# Patient Record
Sex: FEMALE | Race: BLACK OR AFRICAN AMERICAN | NOT HISPANIC OR LATINO | Employment: OTHER | URBAN - METROPOLITAN AREA
[De-identification: names, ages, dates, MRNs, and addresses within clinical notes are randomized per-mention and may not be internally consistent; named-entity substitution may affect disease eponyms.]

---

## 2019-07-15 PROBLEM — G89.29 CHRONIC LOW BACK PAIN: Status: ACTIVE | Noted: 2019-07-15

## 2019-07-15 PROBLEM — D50.9 IRON DEFICIENCY ANEMIA: Status: ACTIVE | Noted: 2019-07-15

## 2019-07-15 PROBLEM — E11.9 TYPE 2 DIABETES MELLITUS, WITHOUT LONG-TERM CURRENT USE OF INSULIN: Status: ACTIVE | Noted: 2019-07-15

## 2019-07-15 PROBLEM — M54.50 CHRONIC LOW BACK PAIN: Status: ACTIVE | Noted: 2019-07-15

## 2019-07-15 PROBLEM — E66.01 MORBID OBESITY WITH BODY MASS INDEX (BMI) OF 40.0 TO 49.9: Status: ACTIVE | Noted: 2019-07-15

## 2019-07-15 PROBLEM — N30.00 ACUTE CYSTITIS: Status: ACTIVE | Noted: 2019-07-15

## 2019-07-22 ENCOUNTER — LAB VISIT (OUTPATIENT)
Dept: LAB | Facility: HOSPITAL | Age: 49
End: 2019-07-22
Attending: INTERNAL MEDICINE
Payer: MEDICAID

## 2019-07-22 ENCOUNTER — TELEPHONE (OUTPATIENT)
Dept: ORTHOPEDICS | Facility: CLINIC | Age: 49
End: 2019-07-22

## 2019-07-22 DIAGNOSIS — D50.9 IRON (FE) DEFICIENCY ANEMIA: Primary | ICD-10-CM

## 2019-07-22 LAB
ALBUMIN SERPL BCP-MCNC: 3.7 G/DL (ref 3.5–5.2)
ALP SERPL-CCNC: 70 U/L (ref 55–135)
ALT SERPL W/O P-5'-P-CCNC: 14 U/L (ref 10–44)
ANION GAP SERPL CALC-SCNC: 10 MMOL/L (ref 8–16)
AST SERPL-CCNC: 16 U/L (ref 10–40)
BILIRUB SERPL-MCNC: 0.5 MG/DL (ref 0.1–1)
BUN SERPL-MCNC: 7 MG/DL (ref 6–20)
CALCIUM SERPL-MCNC: 8.9 MG/DL (ref 8.7–10.5)
CHLORIDE SERPL-SCNC: 106 MMOL/L (ref 95–110)
CO2 SERPL-SCNC: 26 MMOL/L (ref 23–29)
CREAT SERPL-MCNC: 0.7 MG/DL (ref 0.5–1.4)
ERYTHROCYTE [DISTWIDTH] IN BLOOD BY AUTOMATED COUNT: 14.7 % (ref 11.5–14.5)
EST. GFR  (AFRICAN AMERICAN): >60 ML/MIN/1.73 M^2
EST. GFR  (NON AFRICAN AMERICAN): >60 ML/MIN/1.73 M^2
ESTIMATED AVG GLUCOSE: 131 MG/DL (ref 68–131)
GLUCOSE SERPL-MCNC: 122 MG/DL (ref 70–110)
HBA1C MFR BLD HPLC: 6.2 % (ref 4.5–6.2)
HCT VFR BLD AUTO: 38.5 % (ref 37–48.5)
HGB BLD-MCNC: 12.2 G/DL (ref 12–16)
IRON SERPL-MCNC: 27 UG/DL (ref 30–160)
MCH RBC QN AUTO: 26.7 PG (ref 27–31)
MCHC RBC AUTO-ENTMCNC: 31.7 G/DL (ref 32–36)
MCV RBC AUTO: 84 FL (ref 82–98)
PLATELET # BLD AUTO: 209 K/UL (ref 150–350)
PMV BLD AUTO: 11.9 FL (ref 9.2–12.9)
POTASSIUM SERPL-SCNC: 3.9 MMOL/L (ref 3.5–5.1)
PROT SERPL-MCNC: 7.8 G/DL (ref 6–8.4)
RBC # BLD AUTO: 4.57 M/UL (ref 4–5.4)
SATURATED IRON: 6 % (ref 20–50)
SODIUM SERPL-SCNC: 142 MMOL/L (ref 136–145)
TOTAL IRON BINDING CAPACITY: 437 UG/DL (ref 250–450)
TRANSFERRIN SERPL-MCNC: 295 MG/DL (ref 200–375)
WBC # BLD AUTO: 7.28 K/UL (ref 3.9–12.7)

## 2019-07-22 PROCEDURE — 83036 HEMOGLOBIN GLYCOSYLATED A1C: CPT

## 2019-07-22 PROCEDURE — 80053 COMPREHEN METABOLIC PANEL: CPT

## 2019-07-22 PROCEDURE — 83540 ASSAY OF IRON: CPT

## 2019-07-22 PROCEDURE — 36415 COLL VENOUS BLD VENIPUNCTURE: CPT

## 2019-07-22 PROCEDURE — 85027 COMPLETE CBC AUTOMATED: CPT

## 2019-07-22 NOTE — TELEPHONE ENCOUNTER
Called patient to schedule referred appointment from Dr. Germán Velasquez with Dr. Katz for treatment of left hip pain. Appointment made and patient voiced understanding of appointment date, time, and location.

## 2019-07-30 DIAGNOSIS — E11.69 TYPE 2 DIABETES MELLITUS WITH OTHER SPECIFIED COMPLICATION, UNSPECIFIED WHETHER LONG TERM INSULIN USE: Primary | ICD-10-CM

## 2019-07-30 RX ORDER — HEPARIN 100 UNIT/ML
500 SYRINGE INTRAVENOUS
Status: CANCELLED | OUTPATIENT
Start: 2019-08-02

## 2019-07-30 RX ORDER — SODIUM CHLORIDE 0.9 % (FLUSH) 0.9 %
10 SYRINGE (ML) INJECTION
Status: CANCELLED | OUTPATIENT
Start: 2019-08-02

## 2019-08-08 ENCOUNTER — INFUSION (OUTPATIENT)
Dept: INFUSION THERAPY | Facility: HOSPITAL | Age: 49
End: 2019-08-08
Payer: MEDICAID

## 2019-08-08 ENCOUNTER — TELEPHONE (OUTPATIENT)
Dept: INFUSION THERAPY | Facility: HOSPITAL | Age: 49
End: 2019-08-08

## 2019-08-08 VITALS
HEART RATE: 72 BPM | SYSTOLIC BLOOD PRESSURE: 171 MMHG | RESPIRATION RATE: 18 BRPM | OXYGEN SATURATION: 97 % | DIASTOLIC BLOOD PRESSURE: 86 MMHG | TEMPERATURE: 98 F

## 2019-08-08 DIAGNOSIS — D50.0 IRON DEFICIENCY ANEMIA DUE TO CHRONIC BLOOD LOSS: Primary | ICD-10-CM

## 2019-08-08 RX ORDER — SODIUM CHLORIDE 0.9 % (FLUSH) 0.9 %
10 SYRINGE (ML) INJECTION
Status: DISCONTINUED | OUTPATIENT
Start: 2019-08-08 | End: 2019-08-08 | Stop reason: HOSPADM

## 2019-08-08 RX ORDER — SODIUM CHLORIDE 0.9 % (FLUSH) 0.9 %
10 SYRINGE (ML) INJECTION
Status: CANCELLED | OUTPATIENT
Start: 2019-08-15

## 2019-08-08 RX ORDER — SODIUM CHLORIDE 0.9 % (FLUSH) 0.9 %
10 SYRINGE (ML) INJECTION
Status: CANCELLED | OUTPATIENT
Start: 2019-08-08

## 2019-08-08 RX ORDER — SODIUM CHLORIDE 0.9 % (FLUSH) 0.9 %
10 SYRINGE (ML) INJECTION
Status: CANCELLED | OUTPATIENT
Start: 2019-09-02

## 2019-08-08 RX ORDER — SODIUM CHLORIDE 9 MG/ML
INJECTION, SOLUTION INTRAVENOUS CONTINUOUS
Status: CANCELLED | OUTPATIENT
Start: 2019-08-15

## 2019-08-08 RX ORDER — HEPARIN 100 UNIT/ML
500 SYRINGE INTRAVENOUS
Status: CANCELLED | OUTPATIENT
Start: 2019-09-02

## 2019-08-08 RX ORDER — SODIUM CHLORIDE 9 MG/ML
INJECTION, SOLUTION INTRAVENOUS CONTINUOUS
Status: CANCELLED | OUTPATIENT
Start: 2019-08-08

## 2019-08-08 NOTE — NURSING
1015 patient called Dr Moreno office about order as I was calling the office. She stated that the office would be calling here. I hung up phone to await for call. RB RB  1050 I called Dr Moreno office and they faxed over the correct order for injectafer. Awaiting approval RB RN  1243 patient stated that she had to be home to get her child. States to call and reschedule her appointment. D/pricilla iv heplock catheter intact and dressing applied to site. DAWNA RN

## 2019-08-08 NOTE — NURSING
0901 patient ambulated to opt. Vital signs taken. RB RN  0920 iv stated to right ac 20 gauge jelco times 2 attempts. Patient tolerated well. RB RN  0959 pharmacy called Janice stated the 750mg dose was for injectofer not venofer could I call MD and find out what medication they wanted. DAWNA RN  0902 called Dr Moreno office Eloise in office stated that Dr Velasquez had just went in room and would call this nurse back. Awaiting call back explained this to patient. DAWNA DONAHUE

## 2019-08-09 ENCOUNTER — TELEPHONE (OUTPATIENT)
Dept: PHARMACY | Facility: CLINIC | Age: 49
End: 2019-08-09

## 2019-08-09 NOTE — TELEPHONE ENCOUNTER
Informed Patient  that [facility] received prescription for Injectafer and benefits investigation is required.  [facility] will be back in touch once insurance determination is received.

## 2019-08-16 ENCOUNTER — INFUSION (OUTPATIENT)
Dept: INFUSION THERAPY | Facility: HOSPITAL | Age: 49
End: 2019-08-16
Payer: MEDICAID

## 2019-08-16 VITALS
HEART RATE: 65 BPM | DIASTOLIC BLOOD PRESSURE: 84 MMHG | SYSTOLIC BLOOD PRESSURE: 177 MMHG | OXYGEN SATURATION: 100 % | TEMPERATURE: 98 F | RESPIRATION RATE: 18 BRPM

## 2019-08-16 DIAGNOSIS — D50.0 IRON DEFICIENCY ANEMIA DUE TO CHRONIC BLOOD LOSS: Primary | ICD-10-CM

## 2019-08-16 PROCEDURE — 96365 THER/PROPH/DIAG IV INF INIT: CPT

## 2019-08-16 PROCEDURE — 63600175 PHARM REV CODE 636 W HCPCS: Mod: JG | Performed by: INTERNAL MEDICINE

## 2019-08-16 RX ORDER — SODIUM CHLORIDE 0.9 % (FLUSH) 0.9 %
10 SYRINGE (ML) INJECTION
Status: DISCONTINUED | OUTPATIENT
Start: 2019-08-16 | End: 2019-08-16 | Stop reason: HOSPADM

## 2019-08-16 RX ORDER — HEPARIN 100 UNIT/ML
500 SYRINGE INTRAVENOUS
Status: DISCONTINUED | OUTPATIENT
Start: 2019-08-16 | End: 2019-08-16 | Stop reason: HOSPADM

## 2019-08-16 RX ORDER — SODIUM CHLORIDE 9 MG/ML
INJECTION, SOLUTION INTRAVENOUS CONTINUOUS
Status: CANCELLED | OUTPATIENT
Start: 2019-08-23

## 2019-08-16 RX ORDER — SODIUM CHLORIDE 0.9 % (FLUSH) 0.9 %
10 SYRINGE (ML) INJECTION
Status: CANCELLED | OUTPATIENT
Start: 2019-08-23

## 2019-08-16 RX ORDER — HEPARIN 100 UNIT/ML
500 SYRINGE INTRAVENOUS
Status: CANCELLED | OUTPATIENT
Start: 2019-08-23

## 2019-08-16 RX ADMIN — FERRIC CARBOXYMALTOSE INJECTION 750 MG: 50 INJECTION, SOLUTION INTRAVENOUS at 08:08

## 2019-08-16 NOTE — NURSING
0902 PT TOLERATED INFUSION WELL.  IVH DC'D/ PRESSURE AND DRESSING APPLIED/ NO BLEEDING NOTED.  PTS BP ELEVATED PRE AND POST INFUSION.  ADVISED TO CONSULT PCP DR. CUNHA FOR PT EVALUATION.  PT STATED SHE HAS A HX OF FIBROMYALGIA AND SHE IS HURTING 8/10 ALL OVER BODY.  PT VERBALIZED UNDERSTANDING.  PT LEFT OPT AMBULATORY WITH SPOUSE TO POV / HOME.  LHD, RN.

## 2019-08-16 NOTE — NURSING
0828 PT PRESENTED TO THE OPT DEPT VIA AMB FOR INJECTAFER INFUSION/PT IS AAOx3/COOPERATIVE AND PLEASANT WITH STAFF. POC DISCUSSED WITH PT/2 DRUG ALLERGIES NOT LISTED RECEIVED FROM PT TODAY AND ADDED TO CHART. CALL LIGHT WITHIN REACH/SPOUSE WITH PT TODAY. MEDICATIONS RELEASED/PHARMACY CALLED/ORDER RECEIVED ON THEIR END. DBRN.

## 2019-09-10 DIAGNOSIS — M25.551 BILATERAL HIP PAIN: Primary | ICD-10-CM

## 2019-09-10 DIAGNOSIS — M25.552 BILATERAL HIP PAIN: Primary | ICD-10-CM

## 2019-09-11 ENCOUNTER — HOSPITAL ENCOUNTER (OUTPATIENT)
Dept: RADIOLOGY | Facility: HOSPITAL | Age: 49
Discharge: HOME | End: 2019-09-11
Attending: ORTHOPAEDIC SURGERY
Payer: MEDICAID

## 2019-09-11 ENCOUNTER — OFFICE VISIT (OUTPATIENT)
Dept: ORTHOPEDICS | Facility: CLINIC | Age: 49
End: 2019-09-11
Payer: MEDICAID

## 2019-09-11 VITALS
HEIGHT: 65 IN | SYSTOLIC BLOOD PRESSURE: 175 MMHG | WEIGHT: 293 LBS | HEART RATE: 61 BPM | RESPIRATION RATE: 18 BRPM | DIASTOLIC BLOOD PRESSURE: 88 MMHG | BODY MASS INDEX: 48.82 KG/M2

## 2019-09-11 DIAGNOSIS — M70.62 GREATER TROCHANTERIC BURSITIS, LEFT: Primary | ICD-10-CM

## 2019-09-11 DIAGNOSIS — M25.552 BILATERAL HIP PAIN: ICD-10-CM

## 2019-09-11 DIAGNOSIS — M25.551 BILATERAL HIP PAIN: ICD-10-CM

## 2019-09-11 DIAGNOSIS — M54.10 RADICULAR PAIN OF LEFT LOWER EXTREMITY: ICD-10-CM

## 2019-09-11 DIAGNOSIS — M16.0 PRIMARY OSTEOARTHRITIS OF BOTH HIPS: ICD-10-CM

## 2019-09-11 PROCEDURE — 20610 LARGE JOINT ASPIRATION/INJECTION: L GREATER TROCHANTERIC BURSA: ICD-10-PCS | Mod: S$PBB,LT,, | Performed by: ORTHOPAEDIC SURGERY

## 2019-09-11 PROCEDURE — 73521 XR HIPS BILATERAL 2 VIEW INCL AP PELVIS: ICD-10-PCS | Mod: 26,,, | Performed by: RADIOLOGY

## 2019-09-11 PROCEDURE — 73521 X-RAY EXAM HIPS BI 2 VIEWS: CPT | Mod: 26,,, | Performed by: RADIOLOGY

## 2019-09-11 PROCEDURE — 73521 X-RAY EXAM HIPS BI 2 VIEWS: CPT | Mod: TC,PN

## 2019-09-11 PROCEDURE — 99213 OFFICE O/P EST LOW 20 MIN: CPT | Mod: PBBFAC,25,PN | Performed by: ORTHOPAEDIC SURGERY

## 2019-09-11 PROCEDURE — 99204 PR OFFICE/OUTPT VISIT, NEW, LEVL IV, 45-59 MIN: ICD-10-PCS | Mod: 25,S$PBB,, | Performed by: ORTHOPAEDIC SURGERY

## 2019-09-11 PROCEDURE — 99204 OFFICE O/P NEW MOD 45 MIN: CPT | Mod: 25,S$PBB,, | Performed by: ORTHOPAEDIC SURGERY

## 2019-09-11 PROCEDURE — 20610 DRAIN/INJ JOINT/BURSA W/O US: CPT | Mod: PBBFAC,PN | Performed by: ORTHOPAEDIC SURGERY

## 2019-09-11 PROCEDURE — 99999 PR PBB SHADOW E&M-EST. PATIENT-LVL III: ICD-10-PCS | Mod: PBBFAC,,, | Performed by: ORTHOPAEDIC SURGERY

## 2019-09-11 PROCEDURE — 99999 PR PBB SHADOW E&M-EST. PATIENT-LVL III: CPT | Mod: PBBFAC,,, | Performed by: ORTHOPAEDIC SURGERY

## 2019-09-11 RX ORDER — NEOMYCIN SULFATE, POLYMYXIN B SULFATE AND DEXAMETHASONE 3.5; 10000; 1 MG/ML; [USP'U]/ML; MG/ML
SUSPENSION/ DROPS OPHTHALMIC
Refills: 0 | COMMUNITY
Start: 2019-07-30 | End: 2019-10-09

## 2019-09-11 RX ORDER — CIPROFLOXACIN HYDROCHLORIDE 3 MG/ML
SOLUTION/ DROPS OPHTHALMIC
Refills: 1 | COMMUNITY
Start: 2019-09-06 | End: 2019-10-09

## 2019-09-11 RX ORDER — ERYTHROMYCIN 5 MG/G
OINTMENT OPHTHALMIC
Refills: 0 | COMMUNITY
Start: 2019-09-06 | End: 2019-10-09

## 2019-09-11 RX ORDER — OXYMORPHONE HYDROCHLORIDE 20 MG/1
TABLET, FILM COATED, EXTENDED RELEASE ORAL
Refills: 0 | COMMUNITY
Start: 2019-09-06 | End: 2021-03-01

## 2019-09-11 RX ORDER — OXYCODONE HYDROCHLORIDE 15 MG/1
TABLET ORAL
Refills: 0 | COMMUNITY
Start: 2019-09-04 | End: 2021-08-04

## 2019-09-11 RX ADMIN — TRIAMCINOLONE ACETONIDE 40 MG: 40 INJECTION, SUSPENSION INTRA-ARTICULAR; INTRAMUSCULAR at 03:09

## 2019-09-11 NOTE — LETTER
September 12, 2019      Germán Velasquez MD  [address] [place] [address] [place] [address]           [facility]  [address] [place] [address] A  [place] [address]  Phone: [phone]  Fax: [phone]          Patient: Nely Salas   MR Number: [id number]   YOB: 1970   Date of Visit: 9/11/2019       Dear Dr. Germán Velasquez:    Thank you for referring Nely Salas to me for evaluation. Attached you will find relevant portions of my assessment and plan of care.    If you have questions, please do not hesitate to call me. I look forward to following Nely Salas along with you.    Sincerely,    Wiliam Katz, DO    Enclosure  CC:  No Recipients    If you would like to receive this communication electronically, please contact [email or web address] or [phone] to request more information on [employer] Link access.    For providers and/or their staff who would like to refer a patient to [facility], please contact us through our one-stop-shop provider referral line, [facility], at [phone].    If you feel you have received this communication in error or would no longer like to receive these types of communications, please e-mail [email or web address]

## 2019-09-12 PROBLEM — M54.10 RADICULAR PAIN OF LEFT LOWER EXTREMITY: Status: ACTIVE | Noted: 2019-09-12

## 2019-09-12 PROBLEM — M70.62 GREATER TROCHANTERIC BURSITIS, LEFT: Status: ACTIVE | Noted: 2019-09-12

## 2019-09-12 PROBLEM — M16.0 PRIMARY OSTEOARTHRITIS OF BOTH HIPS: Status: ACTIVE | Noted: 2019-09-12

## 2019-09-12 RX ORDER — TRIAMCINOLONE ACETONIDE 40 MG/ML
40 INJECTION, SUSPENSION INTRA-ARTICULAR; INTRAMUSCULAR
Status: DISCONTINUED | OUTPATIENT
Start: 2019-09-11 | End: 2019-09-12 | Stop reason: HOSPADM

## 2019-09-12 NOTE — PROGRESS NOTES
Subjective:      Patient ID: Nely Salas is a 49 y.o. female.    Chief Complaint: Pain of the Right Hip and Pain of the Left Hip    Referring Provider: Germán Velasquez Md  [address] [place] [address], [place] [address]    HPI:  Ms. Salas is a 49-year-old lady who presented today with complaints of bilateral hip pain, left greater than right, of an 8 year duration of insidious onset.  Her symptoms have increased over the last year.  Laying down increases her pain she stated that the pain also radiates down her left leg to her knee and ankle. She has taken NSAIDs without help.  She has not done physical therapy nor had injections.    Past Medical History:   Diagnosis Date     *  *  *  *  *  *  * Hypertension  Seasonal allergies  Anxiety  GERD  Headaches  Sickle cell trait  Fibromyalgia  Chronic pain management      Past Surgical History:   Procedure Laterality Date    CHOLECYSTECTOMY       *  * FALLOPIAN TUBOPLASTY    EGD         Review of patient's allergies indicates:   Allergen Reactions    Bactrim [sulfamethoxazole-trimethoprim] Nausea And Vomiting    Zithromax [azithromycin] Nausea And Vomiting       Social History     Occupational History    UNEMPLOYED   Tobacco Use    Smoking status: Never Smoker    Smokeless tobacco: Never Used   Substance and Sexual Activity    Alcohol use: Yes     Frequency: Monthly or less    Drug use: Never    Sexual activity: Yes     Partners: Male      Family history:  Father:  Alive, stroke.  Mother:  Alive, hypothyroidism/hypertension.  Brother:  6, alive, stroke/diabetes.  Sister:  6, 1 , murder.  Son:  3, alive, denied medical problems.  Daughter:  2, alive, denied medical problems.    Previous Hospitalizations:  Childbirth.    ROS:   Review of Systems   Constitution: Negative for chills and fever.   HENT: Negative for congestion.    Eyes: Negative for blurred vision.   Cardiovascular: Negative for chest pain.   Respiratory: Negative for cough.     Endocrine: Negative for polydipsia.   Hematologic/Lymphatic: Negative for adenopathy.   Skin: Negative for flushing and itching.   Musculoskeletal: Positive for back pain and joint pain. Negative for gout.   Gastrointestinal: Positive for heartburn. Negative for constipation and diarrhea.   Genitourinary: Negative for nocturia.   Neurological: Positive for headaches. Negative for seizures.   Psychiatric/Behavioral: Negative for depression and substance abuse. The patient is nervous/anxious.    Allergic/Immunologic: Positive for environmental allergies.           Objective:      Physical Exam:   General: AAOx3.  No acute distress  HEENT: Normocephalic, PEARLA EOMI, edentulous  Neck: Supple, No JVD  Chest: Symetric, equal excursion on inspiration  Abdomen: Soft NTND  Vascular:  Pulses intact and equal bilaterally.  Capillary refill less than 3 seconds and equal bilaterally  Neurologic:  Pinprick and soft touch intact and equal bilaterally.  Positive straight leg raising left lower extremity.  Integment:  No ecchymosis, no errythema  Extremity:  Hip:  Flexion/extension equal bilaterally greater than 95°/15°.  Internal/external rotation equal bilaterally. Relatively nontender with hip motion. Tender with palpation left greater trochanter.  José's positive left hip.                      Lumbosacral spine:  Forward flexion/backward flexion 45/10 degrees, increased symptoms with backward flexion. Right/left rotation 40/40 degrees, increased symptoms with left rotation. Right/left side bending 25/25 degrees, increased symptoms with left side bending.  Able to heel and toe walk. Tender with palpation lumbosacral spine.  Radiography:  Personally reviewed x-rays of both hips completed on 09/11/2019 showed arthritic changes of both hips with right greater than left and lumbosacral arthritic changes.      Assessment:       Impression:      1. Greater trochanteric bursitis, left    2. Primary osteoarthritis of both hips    3.  Radicular pain of left lower extremity          Plan:       1.  Discussed physical examination and radiographic findings with the patient. Nely understands that she has greater trochanteric bursitis of her left hip as well as a radicular lower extremity problem.  She can trial conservative management or consider surgical intervention.  She stated she would prefer to trial conservative management.  2.  Offered a steroid injection to the greater trochanteric bursa of the left hip, she elected proceed.  3.  Recommend the patient consult with her spine surgeon about the radicular left lower extremity pain. She stated she would call her previous spine surgeon.  4.  Take NSAIDs as tolerated allowed by PCM.  5.  Home exercises to include hip stretching exercises were shown discussed.  6.  Discussed possible referral to physical therapy she declined for now.  7.  [facility] portal was discussed with the patient and information was given.  The patient was encouraged to use the portal for future encounters.  8.  Follow up p.r.n..

## 2019-09-12 NOTE — PROCEDURES
Large Joint Aspiration/Injection: L greater trochanteric bursa  Date/Time: 9/11/2019 3:52 PM  Performed by: Wiliam Katz DO  Authorized by: Wiliam Katz, DO     Consent Done?:  Yes (Verbal)  Indications:  Pain and diagnostic evaluation  Procedure site marked: Yes    Timeout: Prior to procedure the correct patient, procedure, and site was verified      Location:  Hip  Site:  L greater trochanteric bursa  Prep: Patient was prepped and draped in usual sterile fashion    Needle size:  22 G  Medications:  40 mg triamcinolone acetonide 40 mg/mL  Patient tolerance:  Patient tolerated the procedure well with no immediate complications

## 2019-10-03 ENCOUNTER — TELEPHONE (OUTPATIENT)
Dept: INFUSION THERAPY | Facility: HOSPITAL | Age: 49
End: 2019-10-03

## 2019-10-09 ENCOUNTER — OFFICE VISIT (OUTPATIENT)
Dept: PODIATRY | Facility: CLINIC | Age: 49
End: 2019-10-09
Payer: MEDICAID

## 2019-10-09 VITALS
DIASTOLIC BLOOD PRESSURE: 86 MMHG | BODY MASS INDEX: 48.82 KG/M2 | TEMPERATURE: 98 F | HEART RATE: 85 BPM | HEIGHT: 65 IN | SYSTOLIC BLOOD PRESSURE: 145 MMHG | WEIGHT: 293 LBS

## 2019-10-09 DIAGNOSIS — R23.4 FISSURE IN SKIN: ICD-10-CM

## 2019-10-09 DIAGNOSIS — M79.7 FIBROMYALGIA: ICD-10-CM

## 2019-10-09 DIAGNOSIS — M19.079 OSTEOARTHRITIS OF ANKLE AND FOOT, UNSPECIFIED LATERALITY: Primary | ICD-10-CM

## 2019-10-09 DIAGNOSIS — M79.2 NEURITIS: ICD-10-CM

## 2019-10-09 PROCEDURE — 99999 PR PBB SHADOW E&M-EST. PATIENT-LVL III: ICD-10-PCS | Mod: PBBFAC,,, | Performed by: PODIATRIST

## 2019-10-09 PROCEDURE — 99203 PR OFFICE/OUTPT VISIT, NEW, LEVL III, 30-44 MIN: ICD-10-PCS | Mod: S$PBB,,, | Performed by: PODIATRIST

## 2019-10-09 PROCEDURE — 99999 PR PBB SHADOW E&M-EST. PATIENT-LVL III: CPT | Mod: PBBFAC,,, | Performed by: PODIATRIST

## 2019-10-09 PROCEDURE — 99203 OFFICE O/P NEW LOW 30 MIN: CPT | Mod: S$PBB,,, | Performed by: PODIATRIST

## 2019-10-09 PROCEDURE — 99213 OFFICE O/P EST LOW 20 MIN: CPT | Mod: PBBFAC | Performed by: PODIATRIST

## 2019-10-09 RX ORDER — DICLOFENAC SODIUM 10 MG/G
2 GEL TOPICAL 4 TIMES DAILY
Qty: 3 TUBE | Refills: 3 | Status: SHIPPED | OUTPATIENT
Start: 2019-10-09 | End: 2021-08-04

## 2019-10-09 RX ORDER — SPIRONOLACTONE 100 MG/1
100 TABLET, FILM COATED ORAL DAILY
Refills: 1 | COMMUNITY
Start: 2019-08-16 | End: 2021-03-01

## 2019-10-12 PROBLEM — E11.9 TYPE 2 DIABETES MELLITUS, WITHOUT LONG-TERM CURRENT USE OF INSULIN: Status: RESOLVED | Noted: 2019-07-15 | Resolved: 2019-10-12

## 2019-10-12 PROBLEM — M79.7 FIBROMYALGIA: Status: ACTIVE | Noted: 2019-10-12

## 2019-10-13 NOTE — PROGRESS NOTES
Subjective:       Patient ID: Nely Salas is a 49 y.o. female.    Chief Complaint: Foot Pain; Foot Problem; and Nail Problem   Patient presents today for new patient evaluation she has burning itching skin with discomfort all over both feet.  Patient relates her feet have been burning and uncomfortable for the past 2 months patient has no history of diabetes.    Past Medical History:   Diagnosis Date    Hypertension      Past Surgical History:   Procedure Laterality Date    CHOLECYSTECTOMY      FALLOPIAN TUBOPLASTY       History reviewed. No pertinent family history.  Social History     Socioeconomic History    Marital status:      Spouse name: Not on file    Number of children: Not on file    Years of education: Not on file    Highest education level: Not on file   Occupational History    Not on file   Social Needs    Financial resource strain: Not on file    Food insecurity:     Worry: Not on file     Inability: Not on file    Transportation needs:     Medical: Not on file     Non-medical: Not on file   Tobacco Use    Smoking status: Never Smoker    Smokeless tobacco: Never Used   Substance and Sexual Activity    Alcohol use: Yes     Frequency: Monthly or less    Drug use: Never    Sexual activity: Yes     Partners: Male   Lifestyle    Physical activity:     Days per week: Not on file     Minutes per session: Not on file    Stress: Not on file   Relationships    Social connections:     Talks on phone: Not on file     Gets together: Not on file     Attends Confucianism service: Not on file     Active member of club or organization: Not on file     Attends meetings of clubs or organizations: Not on file     Relationship status: Not on file   Other Topics Concern    Not on file   Social History Narrative    Not on file       Current Outpatient Medications   Medication Sig Dispense Refill    oxyCODONE (ROXICODONE) 15 MG Tab TAKE 1 TABLET BY MOUTH THREE TIMES DAILY AS NEEDED FOR PAIN MAY  "CAUSE DROWSINESS  0    oxyMORphone (OPANA ER) 20 MG 12 hr tablet TAKE 1 TABLET BY MOUTH EVERY TWELVE HOURS AS NEEDED FOR PAIN MAY CAUSE DROWSINESS  0    pregabalin (LYRICA) 150 MG capsule Take 150 mg by mouth 2 (two) times daily.      diclofenac sodium (VOLTAREN) 1 % Gel Apply 2 g topically 4 (four) times daily. 3 Tube 3    phentermine 37.5 MG capsule Take 37.5 mg by mouth every morning.      ranitidine (ZANTAC) 150 MG tablet Take 1 tablet (150 mg total) by mouth 2 (two) times daily. (Patient not taking: Reported on 10/9/2019) 60 tablet 11    semaglutide (OZEMPIC) 1 mg/dose (2 mg/1.5 mL) PnIj Inject 1 mg into the skin every 7 days.      sodium chloride 0.9% SolP 250 mL with iron dextran 50 mg/mL Soln Inject 750 mg into the vein once a week.      spironolactone (ALDACTONE) 100 MG tablet Take 100 mg by mouth once daily.  1     No current facility-administered medications for this visit.      Review of patient's allergies indicates:   Allergen Reactions    Bactrim [sulfamethoxazole-trimethoprim] Nausea And Vomiting    Zithromax [azithromycin] Nausea And Vomiting    Amoxicillin trihydrate Other (See Comments)    Sulfamethoxazole Other (See Comments)    Trimethoprim Other (See Comments)       Review of Systems   Musculoskeletal: Positive for arthralgias and joint swelling.   Neurological: Positive for numbness.   All other systems reviewed and are negative.      Objective:      Vitals:    10/09/19 1341   BP: (!) 145/86   Pulse: 85   Temp: 98.4 °F (36.9 °C)   Weight: (!) 144.2 kg (318 lb)   Height: 5' 5" (1.651 m)     Physical Exam   Constitutional: She appears well-developed and well-nourished.   Cardiovascular:   Pulses:       Dorsalis pedis pulses are 1+ on the right side, and 1+ on the left side.        Posterior tibial pulses are 1+ on the right side, and 1+ on the left side.   Pulmonary/Chest: Effort normal.   Musculoskeletal: Normal range of motion. She exhibits tenderness and deformity.   Feet: "   Right Foot:   Protective Sensation: 4 sites tested. 3 sites sensed.   Skin Integrity: Positive for warmth and dry skin.   Left Foot:   Protective Sensation: 4 sites tested. 3 sites sensed.   Skin Integrity: Positive for warmth and dry skin.   Neurological: She is alert.   Skin: Skin is warm. Capillary refill takes 2 to 3 seconds.   Psychiatric: She has a normal mood and affect. Her behavior is normal. Judgment and thought content normal.   Nursing note and vitals reviewed.                           Assessment:       1. Osteoarthritis of ankle and foot, unspecified laterality    2. Fissure in skin    3. Neuritis    4. Fibromyalgia        Plan:       Patient presents today for new patient evaluation she does not have a history of diabetes and states that she has been experiencing itching burning throbbing pain in both of her feet for the past 2 months this is especially bad at night.  Patient is currently under the care of pain management she takes Lyrica 150 mg twice a day she does have a history of fibromyalgia.  Patient's most recent A1c done in July was 6.2 she is not currently being treated for diabetes.  Patient indicates her left foot is much worse than her right she states the worst thing that she wears on her feet are sandals she does get some slight relief when she wears closed in tennis shoes.  Patient has excessively dry cracking flaking skin on both feet she also has thick fungally infected toenails no current signs of bacterial infection noted. Following a lengthy evaluation and discussion with the patient I have advised her obviously the tennis shoes feel better because they have better support the patient needs appropriate support I dispensed the patient some blue arch pads to apply to her sandals and her tennis shoes she is to wear these at all times of weight-bearing I have also recommended Voltaren gel to help with the patient's inflammation and pain on her feet I will consider possibly a diabetic  insole for the patient with additional arch support depending upon how she does with the blue arch pads dispensed today.  Patient will start applying Vicks vapor rub to her nails twice a day every day to control the fungal infection in the nails making them easier to trim and to help prevent ingrowing nails.  Plan follow-up will be 3 weeks patient advised to contact us if she has any problems questions or concerns prior to scheduled follow-up.  Patient was advised the burning pain that she is having at night is likely related to nerve discomfort she is currently under the care of pain management I did not want to adjust her Lyrica because that is being taking care of by pain management.  Total face-to-face time including discussion evaluation and treatment equaled 30 min.This note was created using [employer] voice recognition software that occasionally misinterpreted phrases or words.

## 2019-11-05 DIAGNOSIS — M89.8X5 PAIN OF LEFT FEMUR: Primary | ICD-10-CM

## 2019-11-08 ENCOUNTER — HOSPITAL ENCOUNTER (OUTPATIENT)
Dept: RADIOLOGY | Facility: HOSPITAL | Age: 49
Discharge: HOME | End: 2019-11-08
Attending: ORTHOPAEDIC SURGERY
Payer: MEDICAID

## 2019-11-08 ENCOUNTER — OFFICE VISIT (OUTPATIENT)
Dept: ORTHOPEDICS | Facility: CLINIC | Age: 49
End: 2019-11-08
Payer: MEDICAID

## 2019-11-08 VITALS
SYSTOLIC BLOOD PRESSURE: 177 MMHG | DIASTOLIC BLOOD PRESSURE: 93 MMHG | BODY MASS INDEX: 48.82 KG/M2 | WEIGHT: 293 LBS | HEART RATE: 96 BPM | HEIGHT: 65 IN

## 2019-11-08 DIAGNOSIS — M70.62 GREATER TROCHANTERIC BURSITIS, LEFT: ICD-10-CM

## 2019-11-08 DIAGNOSIS — M54.10 RADICULAR PAIN OF LEFT LOWER EXTREMITY: Primary | ICD-10-CM

## 2019-11-08 DIAGNOSIS — M89.8X5 PAIN OF LEFT FEMUR: ICD-10-CM

## 2019-11-08 PROCEDURE — 99213 OFFICE O/P EST LOW 20 MIN: CPT | Mod: S$PBB,,, | Performed by: ORTHOPAEDIC SURGERY

## 2019-11-08 PROCEDURE — 99999 PR PBB SHADOW E&M-EST. PATIENT-LVL III: ICD-10-PCS | Mod: PBBFAC,,, | Performed by: ORTHOPAEDIC SURGERY

## 2019-11-08 PROCEDURE — 73552 X-RAY EXAM OF FEMUR 2/>: CPT | Mod: 26,LT,, | Performed by: RADIOLOGY

## 2019-11-08 PROCEDURE — 73552 XR FEMUR 2 VIEW LEFT: ICD-10-PCS | Mod: 26,LT,, | Performed by: RADIOLOGY

## 2019-11-08 PROCEDURE — 99999 PR PBB SHADOW E&M-EST. PATIENT-LVL III: CPT | Mod: PBBFAC,,, | Performed by: ORTHOPAEDIC SURGERY

## 2019-11-08 PROCEDURE — 73552 X-RAY EXAM OF FEMUR 2/>: CPT | Mod: TC,PN,LT

## 2019-11-08 PROCEDURE — 99213 OFFICE O/P EST LOW 20 MIN: CPT | Mod: PBBFAC,25,PN | Performed by: ORTHOPAEDIC SURGERY

## 2019-11-08 PROCEDURE — 99213 PR OFFICE/OUTPT VISIT, EST, LEVL III, 20-29 MIN: ICD-10-PCS | Mod: S$PBB,,, | Performed by: ORTHOPAEDIC SURGERY

## 2019-11-13 ENCOUNTER — OFFICE VISIT (OUTPATIENT)
Dept: PODIATRY | Facility: CLINIC | Age: 49
End: 2019-11-13
Payer: MEDICAID

## 2019-11-13 VITALS
SYSTOLIC BLOOD PRESSURE: 184 MMHG | HEART RATE: 71 BPM | BODY MASS INDEX: 65.91 KG/M2 | DIASTOLIC BLOOD PRESSURE: 100 MMHG | TEMPERATURE: 98 F | HEIGHT: 56 IN | WEIGHT: 293 LBS

## 2019-11-13 DIAGNOSIS — R23.4 FISSURE IN SKIN OF FOOT: ICD-10-CM

## 2019-11-13 DIAGNOSIS — M79.2 NEURITIS: Primary | ICD-10-CM

## 2019-11-13 DIAGNOSIS — M19.079 OSTEOARTHRITIS OF ANKLE AND FOOT, UNSPECIFIED LATERALITY: ICD-10-CM

## 2019-11-13 DIAGNOSIS — M79.7 FIBROMYALGIA: ICD-10-CM

## 2019-11-13 PROCEDURE — 99999 PR PBB SHADOW E&M-EST. PATIENT-LVL III: CPT | Mod: PBBFAC,,, | Performed by: PODIATRIST

## 2019-11-13 PROCEDURE — 99213 OFFICE O/P EST LOW 20 MIN: CPT | Mod: PBBFAC | Performed by: PODIATRIST

## 2019-11-13 PROCEDURE — 99213 PR OFFICE/OUTPT VISIT, EST, LEVL III, 20-29 MIN: ICD-10-PCS | Mod: S$PBB,,, | Performed by: PODIATRIST

## 2019-11-13 PROCEDURE — 99213 OFFICE O/P EST LOW 20 MIN: CPT | Mod: S$PBB,,, | Performed by: PODIATRIST

## 2019-11-13 PROCEDURE — 99999 PR PBB SHADOW E&M-EST. PATIENT-LVL III: ICD-10-PCS | Mod: PBBFAC,,, | Performed by: PODIATRIST

## 2019-11-13 RX ORDER — UREA 40 %
CREAM (GRAM) TOPICAL 2 TIMES DAILY
Qty: 85 G | Refills: 6 | Status: SHIPPED | OUTPATIENT
Start: 2019-11-13 | End: 2019-12-13

## 2019-11-13 RX ORDER — VENLAFAXINE HYDROCHLORIDE 75 MG/1
CAPSULE, EXTENDED RELEASE ORAL
Refills: 0 | COMMUNITY
Start: 2019-11-01 | End: 2021-03-01

## 2019-11-13 NOTE — LETTER
November 17, 2019      Germán Velasquez MD  [address] [place] [address]           [facility]  [address] [facility] [place] [address]  Phone: [phone]  Fax: [phone]          Patient: Nely Salas   MR Number: [id number]   YOB: 1970   Date of Visit: 11/13/2019       Dear Dr. Germán Velasquez:    Thank you for referring Nely Salas to me for evaluation. Attached you will find relevant portions of my assessment and plan of care.    If you have questions, please do not hesitate to call me. I look forward to following Nely Salas along with you.    Sincerely,    Panda Guerrero, DAYRON    Enclosure  CC:  No Recipients    If you would like to receive this communication electronically, please contact [email or web address] or [phone] to request more information on [employer] [facility] Link access.    For providers and/or their staff who would like to refer a patient to [facility], please contact us through our one-stop-shop provider referral line, [facility] , at [phone].    If you feel you have received this communication in error or would no longer like to receive these types of communications, please e-mail [email or web address]

## 2019-11-13 NOTE — PROGRESS NOTES
Subjective:      Patient ID: Nely Salas is a 49 y.o. female.    Chief Complaint: Leg Pain (femur and hip)      HPI: Ms. Salas returns today for re-evaluation of her left hip. She stated that the pain radiates from her left buttocks to her left ankle. At her last visit on 09/11/2012 she was referred to a spine surgeon.  She stated she has not seen a spine surgeon yet.  She had an exacerbation of her pain last night and went to the emergency room where she was given a shot.  At her last visit she was given a steroid injection which gave her some relief over hip but it did not resolve the pain that radiated from her buttocks to her ankle.    ROS:  No new diagnosis/surgery/prescriptions since last office visit on 09/11/2012.  Constitution: Negative for chills and fever.   HENT: Negative for congestion.    Eyes: Negative for blurred vision.   Cardiovascular: Negative for chest pain.   Respiratory: Negative for cough.    Endocrine: Negative for polydipsia.   Hematologic/Lymphatic: Negative for adenopathy.   Skin: Negative for flushing and itching.   Musculoskeletal: Positive for back pain and joint pain. Negative for gout.   Gastrointestinal: Positive for heartburn. Negative for constipation and diarrhea.   Genitourinary: Negative for nocturia.   Neurological: Positive for headaches. Negative for seizures.   Psychiatric/Behavioral: Negative for depression and substance abuse. The patient is nervous/anxious.    Allergic/Immunologic: Positive for environmental allergies.       Objective:      Physical Exam:   General: AAOx3.  No acute distress  Vascular:  Pulses intact and equal bilaterally.  Capillary refill less than 3 seconds and equal bilaterally  Neurologic:  Pinprick and soft touch intact and equal bilaterally.  Positive straight leg raising left lower extremity  Integment:  No ecchymosis, no errythema  Extremity: Hip:  Flexion/extension equal bilaterally greater than 95°/15°.  Internal/external rotation equal  bilaterally. Relatively nontender with hip motion. Minimal tenderness with palpation left greater trochanter.  José's  relatively negative both hips.  Nontender with groin palpation bilaterally.                      Lumbosacral spine:  Forward flexion/backward flexion 45/10 degrees, increased symptoms with backward flexion. Right/left rotation 40/40 degrees, increased symptoms with left rotation. Right/left side bending 25/25 degrees, increased symptoms with left side bending.  Able to heel and toe walk. Tender with palpation lumbosacral spine.  Radiography:  X-rays of the left femur completed on 11/08/2019 showed a ruffled border of the greater trochanter consistent with greater trochanteric bursitis, otherwise no fracture dislocation.      Assessment:       Impression:   1.  Radicular left lower extremity pain.  2.  Mild greater trochanteric bursitis left hip.      Plan:       1.  Discussed physical examination with the patient. Nely understands that she has radicular left lower extremity pain.  She understands that this office does not treat back issues and she was referred to a spine surgeon.  She needs to follow-up with a spine surgeon.  She also understands she was recently given a steroid injection to the greater trochanter and since it was so recent another when cannot be given for at least another month.  2.  The patient was referred to Spine surgery at last visit she understands she should see the spine surgeon.  3.  Take NSAIDs as tolerated allowed by PCM.  4.  Do home exercises as previously discussed such as hip stretching exercises.  5.  Offered to refer to physical therapy she declined for now.  6.  [facility] portal was discussed with the patient and information was given.  The patient was encouraged to use the portal for future encounters.  7.  Follow up p.r.n..

## 2019-11-17 NOTE — PROGRESS NOTES
Subjective:       Patient ID: Nely Salas is a 49 y.o. female.    Chief Complaint: Follow-up; Foot Pain; Foot Problem; and Nail Problem   Patient presents today for new patient evaluation she has burning itching skin with discomfort all over both feet.  Patient relates her feet have been burning and uncomfortable for the past 2 months patient has no history of diabetes.    Past Medical History:   Diagnosis Date    Hypertension      Past Surgical History:   Procedure Laterality Date    CHOLECYSTECTOMY      FALLOPIAN TUBOPLASTY       History reviewed. No pertinent family history.  Social History     Socioeconomic History    Marital status:      Spouse name: Not on file    Number of children: Not on file    Years of education: Not on file    Highest education level: Not on file   Occupational History    Not on file   Social Needs    Financial resource strain: Not on file    Food insecurity:     Worry: Not on file     Inability: Not on file    Transportation needs:     Medical: Not on file     Non-medical: Not on file   Tobacco Use    Smoking status: Never Smoker    Smokeless tobacco: Never Used   Substance and Sexual Activity    Alcohol use: Yes     Frequency: Monthly or less    Drug use: Never    Sexual activity: Yes     Partners: Male   Lifestyle    Physical activity:     Days per week: Not on file     Minutes per session: Not on file    Stress: Not on file   Relationships    Social connections:     Talks on phone: Not on file     Gets together: Not on file     Attends Voodoo service: Not on file     Active member of club or organization: Not on file     Attends meetings of clubs or organizations: Not on file     Relationship status: Not on file   Other Topics Concern    Not on file   Social History Narrative    Not on file       Current Outpatient Medications   Medication Sig Dispense Refill    oxyCODONE (ROXICODONE) 15 MG Tab TAKE 1 TABLET BY MOUTH THREE TIMES DAILY AS NEEDED FOR  "PAIN MAY CAUSE DROWSINESS  0    oxyMORphone (OPANA ER) 20 MG 12 hr tablet TAKE 1 TABLET BY MOUTH EVERY TWELVE HOURS AS NEEDED FOR PAIN MAY CAUSE DROWSINESS  0    phentermine 37.5 MG capsule Take 37.5 mg by mouth every morning.      pregabalin (LYRICA) 150 MG capsule Take 150 mg by mouth 2 (two) times daily.      ranitidine (ZANTAC) 150 MG tablet Take 1 tablet (150 mg total) by mouth 2 (two) times daily. 60 tablet 11    semaglutide (OZEMPIC) 1 mg/dose (2 mg/1.5 mL) PnIj Inject 1 mg into the skin every 7 days.      sodium chloride 0.9% SolP 250 mL with iron dextran 50 mg/mL Soln Inject 750 mg into the vein once a week.      spironolactone (ALDACTONE) 100 MG tablet Take 100 mg by mouth once daily.  1    venlafaxine (EFFEXOR-XR) 75 MG 24 hr capsule TAKE 1 CAPSULE BY MOUTH EVERY MORNING AS DIRECTED FOR MOOD  0    diclofenac sodium (VOLTAREN) 1 % Gel Apply 2 g topically 4 (four) times daily. 3 Tube 3    urea (CARMOL) 40 % Crea Apply topically 2 (two) times daily. 85 g 6     No current facility-administered medications for this visit.      Review of patient's allergies indicates:   Allergen Reactions    Bactrim [sulfamethoxazole-trimethoprim] Nausea And Vomiting    Zithromax [azithromycin] Nausea And Vomiting    Amoxicillin trihydrate Other (See Comments)    Sulfamethoxazole Other (See Comments)    Trimethoprim Other (See Comments)       Review of Systems   Musculoskeletal: Positive for arthralgias and joint swelling.   Neurological: Positive for numbness.   All other systems reviewed and are negative.      Objective:      Vitals:    11/13/19 1453   BP: (!) 184/100   Pulse: 71   Temp: 98 °F (36.7 °C)   Weight: (!) 146.1 kg (322 lb)   Height: 4' 8" (1.422 m)     Physical Exam   Constitutional: She appears well-developed and well-nourished.   Cardiovascular:   Pulses:       Dorsalis pedis pulses are 1+ on the right side, and 1+ on the left side.        Posterior tibial pulses are 1+ on the right side, and 1+ " on the left side.   Pulmonary/Chest: Effort normal.   Musculoskeletal: Normal range of motion. She exhibits tenderness and deformity.   Feet:   Right Foot:   Protective Sensation: 4 sites tested. 3 sites sensed.   Skin Integrity: Positive for warmth and dry skin.   Left Foot:   Protective Sensation: 4 sites tested. 3 sites sensed.   Skin Integrity: Positive for warmth and dry skin.   Neurological: She is alert.   Skin: Skin is warm. Capillary refill takes 2 to 3 seconds.   Psychiatric: She has a normal mood and affect. Her behavior is normal. Judgment and thought content normal.   Nursing note and vitals reviewed.                           Assessment:       1. Neuritis    2. Fibromyalgia    3. Fissure in skin of foot    4. Osteoarthritis of ankle and foot, unspecified laterality        Plan:       Patient presents today for new patient evaluation she does not have a history of diabetes and states that she has been experiencing itching burning throbbing pain in both of her feet for the past 2 months this is especially bad at night.  Patient is currently under the care of pain management she takes Lyrica 150 mg twice a day she does have a history of fibromyalgia.  Patient's most recent A1c done in July was 6.2 she is not currently being treated for diabetes.  Patient indicates her left foot is much worse than her right she states the worst thing that she wears on her feet are sandals she does get some slight relief when she wears closed in tennis shoes.  Patient has excessively dry cracking flaking skin on both feet she also has thick fungally infected toenails no current signs of bacterial infection noted. Patient indicated she has been using the Voltaren gel it has definitely helped to relieve some of her symptoms.  Patient indicates the arch pads in the form of diabetic insoles definitely helped her although she is not wearing them today I did give the patient a prescription for 40% urea cream to apply twice a day  every day for the excessively dry cracking fissuring skin on the plantar surface of both feet.  Patient advised keeping her feet well moist rated will likely help the burning that she is experiencing on the bottom of her feet.  Patient will start applying Vicks vapor rub to her nails twice a day every day to control the fungal infection in the nails making them easier to trim and to help prevent ingrowing nails.  Patient was advised the burning pain that she is having at night is likely related to nerve discomfort she is currently under the care of pain management I did not want to adjust her Lyrica because that is being taking care of by pain management.  Patient has not started applying Vicks vapor rub on a regular basis yet I have recommended she do this to make it easier to trim her nails which will help to prevent the chronically ingrown toenails on both big toes.  Plan follow-up will be as needed prescription dispensed for 40% urea.  Total face-to-face time including discussion evaluation and treatment equaled 20 min.This note was created using [employer] voice recognition software that occasionally misinterpreted phrases or words.

## 2020-01-27 ENCOUNTER — LAB VISIT (OUTPATIENT)
Dept: LAB | Facility: HOSPITAL | Age: 50
End: 2020-01-27
Attending: INTERNAL MEDICINE
Payer: MEDICAID

## 2020-01-27 DIAGNOSIS — E61.1 IRON DEFICIENCY: Primary | ICD-10-CM

## 2020-01-27 DIAGNOSIS — D64.9 ANEMIA: ICD-10-CM

## 2020-01-27 LAB
ALBUMIN SERPL BCP-MCNC: 3.8 G/DL (ref 3.5–5.2)
ALP SERPL-CCNC: 61 U/L (ref 55–135)
ALT SERPL W/O P-5'-P-CCNC: 14 U/L (ref 10–44)
ANION GAP SERPL CALC-SCNC: 8 MMOL/L (ref 8–16)
AST SERPL-CCNC: 16 U/L (ref 10–40)
BILIRUB SERPL-MCNC: 0.6 MG/DL (ref 0.1–1)
BUN SERPL-MCNC: 10 MG/DL (ref 6–20)
CALCIUM SERPL-MCNC: 8.4 MG/DL (ref 8.7–10.5)
CHLORIDE SERPL-SCNC: 102 MMOL/L (ref 95–110)
CO2 SERPL-SCNC: 24 MMOL/L (ref 23–29)
CREAT SERPL-MCNC: 0.6 MG/DL (ref 0.5–1.4)
ERYTHROCYTE [DISTWIDTH] IN BLOOD BY AUTOMATED COUNT: 12.8 % (ref 11.5–14.5)
EST. GFR  (AFRICAN AMERICAN): >60 ML/MIN/1.73 M^2
EST. GFR  (NON AFRICAN AMERICAN): >60 ML/MIN/1.73 M^2
ESTIMATED AVG GLUCOSE: 134 MG/DL (ref 68–131)
GLUCOSE SERPL-MCNC: 143 MG/DL (ref 70–110)
HBA1C MFR BLD HPLC: 6.3 % (ref 4.5–6.2)
HCT VFR BLD AUTO: 40.1 % (ref 37–48.5)
HGB BLD-MCNC: 12.8 G/DL (ref 12–16)
IRON SERPL-MCNC: 55 UG/DL (ref 30–160)
MCH RBC QN AUTO: 26.9 PG (ref 27–31)
MCHC RBC AUTO-ENTMCNC: 31.9 G/DL (ref 32–36)
MCV RBC AUTO: 84 FL (ref 82–98)
PLATELET # BLD AUTO: 208 K/UL (ref 150–350)
PMV BLD AUTO: 12.1 FL (ref 9.2–12.9)
POTASSIUM SERPL-SCNC: 3.3 MMOL/L (ref 3.5–5.1)
PROT SERPL-MCNC: 7.6 G/DL (ref 6–8.4)
RBC # BLD AUTO: 4.75 M/UL (ref 4–5.4)
SATURATED IRON: 14 % (ref 20–50)
SODIUM SERPL-SCNC: 134 MMOL/L (ref 136–145)
TOTAL IRON BINDING CAPACITY: 406 UG/DL (ref 250–450)
TRANSFERRIN SERPL-MCNC: 274 MG/DL (ref 200–375)
WBC # BLD AUTO: 7.87 K/UL (ref 3.9–12.7)

## 2020-01-27 PROCEDURE — 36415 COLL VENOUS BLD VENIPUNCTURE: CPT

## 2020-01-27 PROCEDURE — 85027 COMPLETE CBC AUTOMATED: CPT

## 2020-01-27 PROCEDURE — 83540 ASSAY OF IRON: CPT

## 2020-01-27 PROCEDURE — 83036 HEMOGLOBIN GLYCOSYLATED A1C: CPT

## 2020-01-27 PROCEDURE — 80053 COMPREHEN METABOLIC PANEL: CPT

## 2020-02-03 ENCOUNTER — TELEPHONE (OUTPATIENT)
Dept: INFUSION THERAPY | Facility: HOSPITAL | Age: 50
End: 2020-02-03

## 2020-02-03 NOTE — TELEPHONE ENCOUNTER
Faxed provider to inform staff of diagnosis code and authorization number requirement. Awaiting information to schedule patient.

## 2020-09-18 ENCOUNTER — LAB VISIT (OUTPATIENT)
Dept: LAB | Facility: HOSPITAL | Age: 50
End: 2020-09-18
Attending: INTERNAL MEDICINE
Payer: MEDICAID

## 2020-09-18 DIAGNOSIS — E11.9 TYPE 2 DIABETES MELLITUS: Primary | ICD-10-CM

## 2020-09-18 LAB
ERYTHROCYTE [DISTWIDTH] IN BLOOD BY AUTOMATED COUNT: 13.4 % (ref 11.5–14.5)
ESTIMATED AVG GLUCOSE: 209 MG/DL (ref 68–131)
HBA1C MFR BLD HPLC: 8.9 % (ref 4.5–6.2)
HCT VFR BLD AUTO: 36.5 % (ref 37–48.5)
HGB BLD-MCNC: 11.6 G/DL (ref 12–16)
IRON SERPL-MCNC: 35 UG/DL (ref 30–160)
MCH RBC QN AUTO: 26.4 PG (ref 27–31)
MCHC RBC AUTO-ENTMCNC: 31.8 G/DL (ref 32–36)
MCV RBC AUTO: 83 FL (ref 82–98)
PLATELET # BLD AUTO: 261 K/UL (ref 150–350)
PMV BLD AUTO: 12.5 FL (ref 9.2–12.9)
RBC # BLD AUTO: 4.4 M/UL (ref 4–5.4)
SATURATED IRON: 9 % (ref 20–50)
T4 SERPL-MCNC: 7.4 UG/DL (ref 4.5–11.5)
TOTAL IRON BINDING CAPACITY: 374 UG/DL (ref 250–450)
TRANSFERRIN SERPL-MCNC: 253 MG/DL (ref 200–375)
TSH SERPL DL<=0.005 MIU/L-ACNC: 1.28 UIU/ML (ref 0.34–5.6)
WBC # BLD AUTO: 7.39 K/UL (ref 3.9–12.7)

## 2020-09-18 PROCEDURE — 85027 COMPLETE CBC AUTOMATED: CPT

## 2020-09-18 PROCEDURE — 84443 ASSAY THYROID STIM HORMONE: CPT

## 2020-09-18 PROCEDURE — 84436 ASSAY OF TOTAL THYROXINE: CPT

## 2020-09-18 PROCEDURE — 36415 COLL VENOUS BLD VENIPUNCTURE: CPT

## 2020-09-18 PROCEDURE — 83036 HEMOGLOBIN GLYCOSYLATED A1C: CPT

## 2020-09-18 PROCEDURE — 83540 ASSAY OF IRON: CPT

## 2020-09-29 PROBLEM — E11.9 TYPE 2 DIABETES MELLITUS WITHOUT COMPLICATION, WITHOUT LONG-TERM CURRENT USE OF INSULIN: Status: ACTIVE | Noted: 2020-09-29

## 2020-12-11 PROBLEM — M10.9: Status: ACTIVE | Noted: 2020-12-11

## 2021-03-01 ENCOUNTER — OFFICE VISIT (OUTPATIENT)
Dept: PODIATRY | Facility: CLINIC | Age: 51
End: 2021-03-01
Payer: MEDICAID

## 2021-03-01 VITALS
SYSTOLIC BLOOD PRESSURE: 142 MMHG | BODY MASS INDEX: 48.82 KG/M2 | TEMPERATURE: 98 F | HEART RATE: 69 BPM | HEIGHT: 65 IN | WEIGHT: 293 LBS | DIASTOLIC BLOOD PRESSURE: 80 MMHG

## 2021-03-01 DIAGNOSIS — E11.9 TYPE 2 DIABETES MELLITUS WITHOUT COMPLICATION, WITHOUT LONG-TERM CURRENT USE OF INSULIN: Primary | ICD-10-CM

## 2021-03-01 DIAGNOSIS — E11.49 TYPE II DIABETES MELLITUS WITH NEUROLOGICAL MANIFESTATIONS: ICD-10-CM

## 2021-03-01 DIAGNOSIS — M79.2 NEURITIS: ICD-10-CM

## 2021-03-01 DIAGNOSIS — M79.7 FIBROMYALGIA: ICD-10-CM

## 2021-03-01 PROCEDURE — 99213 OFFICE O/P EST LOW 20 MIN: CPT | Mod: PBBFAC | Performed by: PODIATRIST

## 2021-03-01 PROCEDURE — 99214 OFFICE O/P EST MOD 30 MIN: CPT | Mod: S$PBB,,, | Performed by: PODIATRIST

## 2021-03-01 PROCEDURE — 99214 PR OFFICE/OUTPT VISIT, EST, LEVL IV, 30-39 MIN: ICD-10-PCS | Mod: S$PBB,,, | Performed by: PODIATRIST

## 2021-03-01 PROCEDURE — 99999 PR PBB SHADOW E&M-EST. PATIENT-LVL III: ICD-10-PCS | Mod: PBBFAC,,, | Performed by: PODIATRIST

## 2021-03-01 PROCEDURE — 99999 PR PBB SHADOW E&M-EST. PATIENT-LVL III: CPT | Mod: PBBFAC,,, | Performed by: PODIATRIST

## 2021-03-01 RX ORDER — MELOXICAM 15 MG/1
15 TABLET ORAL DAILY
Qty: 30 TABLET | Refills: 1 | Status: SHIPPED | OUTPATIENT
Start: 2021-03-01 | End: 2021-05-13

## 2021-03-01 RX ORDER — AMLODIPINE BESYLATE 10 MG/1
10 TABLET ORAL DAILY
COMMUNITY
Start: 2020-11-24 | End: 2021-09-26

## 2021-05-03 ENCOUNTER — TELEPHONE (OUTPATIENT)
Dept: PODIATRY | Facility: CLINIC | Age: 51
End: 2021-05-03

## 2021-06-24 ENCOUNTER — TELEPHONE (OUTPATIENT)
Dept: ORTHOPEDICS | Facility: CLINIC | Age: 51
End: 2021-06-24

## 2021-07-07 DIAGNOSIS — M25.512 ACUTE PAIN OF LEFT SHOULDER: Primary | ICD-10-CM

## 2021-07-09 ENCOUNTER — HOSPITAL ENCOUNTER (OUTPATIENT)
Dept: RADIOLOGY | Facility: HOSPITAL | Age: 51
Discharge: HOME | End: 2021-07-09
Attending: ORTHOPAEDIC SURGERY
Payer: MEDICAID

## 2021-07-09 ENCOUNTER — OFFICE VISIT (OUTPATIENT)
Dept: ORTHOPEDICS | Facility: CLINIC | Age: 51
End: 2021-07-09
Payer: MEDICAID

## 2021-07-09 VITALS
WEIGHT: 293 LBS | HEART RATE: 69 BPM | OXYGEN SATURATION: 97 % | RESPIRATION RATE: 18 BRPM | BODY MASS INDEX: 48.82 KG/M2 | HEIGHT: 65 IN

## 2021-07-09 DIAGNOSIS — M25.512 ACUTE PAIN OF LEFT SHOULDER: ICD-10-CM

## 2021-07-09 DIAGNOSIS — M19.012 GLENOHUMERAL ARTHRITIS, LEFT: ICD-10-CM

## 2021-07-09 DIAGNOSIS — M75.82 ROTATOR CUFF TENDINITIS, LEFT: Primary | ICD-10-CM

## 2021-07-09 DIAGNOSIS — M75.22 BICEPS TENDONITIS, LEFT: ICD-10-CM

## 2021-07-09 DIAGNOSIS — M79.2 RADICULAR PAIN OF LEFT UPPER EXTREMITY: ICD-10-CM

## 2021-07-09 DIAGNOSIS — M19.012 ARTHRITIS OF LEFT ACROMIOCLAVICULAR JOINT: ICD-10-CM

## 2021-07-09 PROCEDURE — 99214 PR OFFICE/OUTPT VISIT, EST, LEVL IV, 30-39 MIN: ICD-10-PCS | Mod: 25,S$PBB,, | Performed by: ORTHOPAEDIC SURGERY

## 2021-07-09 PROCEDURE — 99999 PR PBB SHADOW E&M-EST. PATIENT-LVL III: CPT | Mod: PBBFAC,,, | Performed by: ORTHOPAEDIC SURGERY

## 2021-07-09 PROCEDURE — 73030 XR SHOULDER TRAUMA 3 VIEW LEFT: ICD-10-PCS | Mod: 26,LT,, | Performed by: RADIOLOGY

## 2021-07-09 PROCEDURE — 20605 DRAIN/INJ JOINT/BURSA W/O US: CPT | Mod: S$PBB,51,LT, | Performed by: ORTHOPAEDIC SURGERY

## 2021-07-09 PROCEDURE — 99214 OFFICE O/P EST MOD 30 MIN: CPT | Mod: 25,S$PBB,, | Performed by: ORTHOPAEDIC SURGERY

## 2021-07-09 PROCEDURE — 99213 OFFICE O/P EST LOW 20 MIN: CPT | Mod: PBBFAC | Performed by: ORTHOPAEDIC SURGERY

## 2021-07-09 PROCEDURE — 20605 PR DRAIN/INJECT INTERMEDIATE JOINT/BURSA: ICD-10-PCS | Mod: S$PBB,51,LT, | Performed by: ORTHOPAEDIC SURGERY

## 2021-07-09 PROCEDURE — 20610 DRAIN/INJ JOINT/BURSA W/O US: CPT | Mod: PBBFAC | Performed by: ORTHOPAEDIC SURGERY

## 2021-07-09 PROCEDURE — 99999 PR PBB SHADOW E&M-EST. PATIENT-LVL III: ICD-10-PCS | Mod: PBBFAC,,, | Performed by: ORTHOPAEDIC SURGERY

## 2021-07-09 PROCEDURE — 73030 X-RAY EXAM OF SHOULDER: CPT | Mod: 26,LT,, | Performed by: RADIOLOGY

## 2021-07-09 PROCEDURE — 20610 LARGE JOINT ASPIRATION/INJECTION: L GLENOHUMERAL: ICD-10-PCS | Mod: S$PBB,LT,, | Performed by: ORTHOPAEDIC SURGERY

## 2021-07-09 PROCEDURE — 73030 X-RAY EXAM OF SHOULDER: CPT | Mod: TC,FY,LT

## 2021-07-09 RX ORDER — TRIAMCINOLONE ACETONIDE 40 MG/ML
40 INJECTION, SUSPENSION INTRA-ARTICULAR; INTRAMUSCULAR
Status: DISCONTINUED | OUTPATIENT
Start: 2021-07-09 | End: 2021-07-09 | Stop reason: HOSPADM

## 2021-07-09 RX ADMIN — TRIAMCINOLONE ACETONIDE 40 MG: 40 INJECTION, SUSPENSION INTRA-ARTICULAR; INTRAMUSCULAR at 09:07

## 2021-07-09 RX ADMIN — TRIAMCINOLONE ACETONIDE 10 MG: 10 INJECTION, SUSPENSION INTRA-ARTICULAR; INTRALESIONAL at 09:07

## 2021-07-16 ENCOUNTER — TELEPHONE (OUTPATIENT)
Dept: OBSTETRICS AND GYNECOLOGY | Facility: CLINIC | Age: 51
End: 2021-07-16

## 2021-07-30 ENCOUNTER — TELEPHONE (OUTPATIENT)
Dept: PODIATRY | Facility: CLINIC | Age: 51
End: 2021-07-30

## 2021-08-04 ENCOUNTER — OFFICE VISIT (OUTPATIENT)
Dept: PODIATRY | Facility: CLINIC | Age: 51
End: 2021-08-04
Payer: MEDICAID

## 2021-08-04 VITALS
TEMPERATURE: 98 F | SYSTOLIC BLOOD PRESSURE: 143 MMHG | WEIGHT: 293 LBS | HEART RATE: 78 BPM | HEIGHT: 65 IN | DIASTOLIC BLOOD PRESSURE: 79 MMHG | BODY MASS INDEX: 48.82 KG/M2

## 2021-08-04 DIAGNOSIS — M79.2 NEURITIS: ICD-10-CM

## 2021-08-04 DIAGNOSIS — E11.9 COMPREHENSIVE DIABETIC FOOT EXAMINATION, TYPE 2 DM, ENCOUNTER FOR: ICD-10-CM

## 2021-08-04 DIAGNOSIS — M19.079 OSTEOARTHRITIS OF ANKLE AND FOOT, UNSPECIFIED LATERALITY: ICD-10-CM

## 2021-08-04 DIAGNOSIS — E11.49 TYPE II DIABETES MELLITUS WITH NEUROLOGICAL MANIFESTATIONS: Primary | ICD-10-CM

## 2021-08-04 PROCEDURE — 99999 PR PBB SHADOW E&M-EST. PATIENT-LVL IV: ICD-10-PCS | Mod: PBBFAC,,, | Performed by: PODIATRIST

## 2021-08-04 PROCEDURE — 99999 PR PBB SHADOW E&M-EST. PATIENT-LVL IV: CPT | Mod: PBBFAC,,, | Performed by: PODIATRIST

## 2021-08-04 PROCEDURE — 99214 OFFICE O/P EST MOD 30 MIN: CPT | Mod: S$PBB,,, | Performed by: PODIATRIST

## 2021-08-04 PROCEDURE — 99214 OFFICE O/P EST MOD 30 MIN: CPT | Mod: PBBFAC | Performed by: PODIATRIST

## 2021-08-04 PROCEDURE — 99214 PR OFFICE/OUTPT VISIT, EST, LEVL IV, 30-39 MIN: ICD-10-PCS | Mod: S$PBB,,, | Performed by: PODIATRIST

## 2021-08-04 RX ORDER — SPIRONOLACTONE AND HYDROCHLOROTHIAZIDE 25; 25 MG/1; MG/1
2 TABLET ORAL DAILY
COMMUNITY
Start: 2021-05-28 | End: 2021-09-26

## 2021-08-04 RX ORDER — OXYCODONE AND ACETAMINOPHEN 10; 325 MG/1; MG/1
1 TABLET ORAL EVERY 8 HOURS PRN
COMMUNITY
Start: 2021-07-25 | End: 2021-09-26

## 2021-08-04 RX ORDER — AMITRIPTYLINE HYDROCHLORIDE 50 MG/1
50 TABLET, FILM COATED ORAL NIGHTLY
Qty: 30 TABLET | Refills: 3 | Status: SHIPPED | OUTPATIENT
Start: 2021-08-04 | End: 2021-09-26

## 2021-08-04 RX ORDER — VALSARTAN AND HYDROCHLOROTHIAZIDE 80; 12.5 MG/1; MG/1
TABLET, FILM COATED ORAL
COMMUNITY
Start: 2021-04-15 | End: 2021-09-26

## 2021-08-04 RX ORDER — ERGOCALCIFEROL 1.25 MG/1
50000 CAPSULE ORAL
COMMUNITY
Start: 2021-07-14 | End: 2021-09-26

## 2021-08-04 RX ORDER — OXYCODONE HYDROCHLORIDE 15 MG/1
15 TABLET ORAL EVERY 4 HOURS PRN
COMMUNITY

## 2021-08-04 RX ORDER — SOD SULF/POT CHLORIDE/MAG SULF 1.479 G
TABLET ORAL
COMMUNITY
Start: 2021-04-15 | End: 2021-09-26

## 2021-08-04 RX ORDER — LANCETS 30 GAUGE
EACH MISCELLANEOUS
COMMUNITY
Start: 2021-05-11 | End: 2021-09-26

## 2021-08-04 RX ORDER — LANCETS 30 GAUGE
EACH MISCELLANEOUS
COMMUNITY
Start: 2021-05-11 | End: 2024-04-01

## 2021-08-04 RX ORDER — CIPROFLOXACIN 500 MG/1
500 TABLET ORAL
COMMUNITY
Start: 2021-07-25 | End: 2021-09-26

## 2021-08-07 PROBLEM — E11.9 COMPREHENSIVE DIABETIC FOOT EXAMINATION, TYPE 2 DM, ENCOUNTER FOR: Status: ACTIVE | Noted: 2021-08-07

## 2021-09-23 ENCOUNTER — OFFICE VISIT (OUTPATIENT)
Dept: PODIATRY | Facility: CLINIC | Age: 51
End: 2021-09-23
Payer: MEDICAID

## 2021-09-23 VITALS
WEIGHT: 293 LBS | RESPIRATION RATE: 18 BRPM | SYSTOLIC BLOOD PRESSURE: 129 MMHG | HEIGHT: 65 IN | BODY MASS INDEX: 48.82 KG/M2 | HEART RATE: 82 BPM | DIASTOLIC BLOOD PRESSURE: 75 MMHG

## 2021-09-23 DIAGNOSIS — B35.3 TINEA PEDIS OF BOTH FEET: ICD-10-CM

## 2021-09-23 DIAGNOSIS — R23.4 PEELING SKIN: ICD-10-CM

## 2021-09-23 DIAGNOSIS — B35.1 ONYCHOMYCOSIS OF TOENAIL: ICD-10-CM

## 2021-09-23 DIAGNOSIS — E11.49 TYPE II DIABETES MELLITUS WITH NEUROLOGICAL MANIFESTATIONS: Primary | ICD-10-CM

## 2021-09-23 PROCEDURE — 99999 PR PBB SHADOW E&M-EST. PATIENT-LVL IV: CPT | Mod: PBBFAC,,, | Performed by: PODIATRIST

## 2021-09-23 PROCEDURE — 99214 PR OFFICE/OUTPT VISIT, EST, LEVL IV, 30-39 MIN: ICD-10-PCS | Mod: S$PBB,,, | Performed by: PODIATRIST

## 2021-09-23 PROCEDURE — 99214 OFFICE O/P EST MOD 30 MIN: CPT | Mod: S$PBB,,, | Performed by: PODIATRIST

## 2021-09-23 PROCEDURE — 99999 PR PBB SHADOW E&M-EST. PATIENT-LVL IV: ICD-10-PCS | Mod: PBBFAC,,, | Performed by: PODIATRIST

## 2021-09-23 PROCEDURE — 99214 OFFICE O/P EST MOD 30 MIN: CPT | Mod: PBBFAC | Performed by: PODIATRIST

## 2021-09-23 RX ORDER — HYDROCORTISONE 25 MG/G
CREAM TOPICAL
COMMUNITY
Start: 2021-08-05 | End: 2021-12-12

## 2021-09-23 RX ORDER — AMLODIPINE BESYLATE 5 MG/1
5 TABLET ORAL
COMMUNITY
Start: 2021-06-21 | End: 2021-09-26

## 2021-09-23 RX ORDER — PREGABALIN 100 MG/1
100 CAPSULE ORAL
COMMUNITY
End: 2021-09-26

## 2021-09-23 RX ORDER — HYDROCORTISONE 25 MG/G
CREAM TOPICAL
COMMUNITY
Start: 2021-08-05 | End: 2021-09-26

## 2021-09-23 RX ORDER — EMPAGLIFLOZIN 10 MG/1
TABLET, FILM COATED ORAL
COMMUNITY
Start: 2021-08-31 | End: 2021-09-26 | Stop reason: SDUPTHER

## 2021-09-23 RX ORDER — PREGABALIN 200 MG/1
200 CAPSULE ORAL 4 TIMES DAILY
Status: ON HOLD | COMMUNITY
Start: 2021-09-18 | End: 2022-06-13

## 2021-09-23 RX ORDER — PHENTERMINE HYDROCHLORIDE 37.5 MG/1
37.5 TABLET ORAL DAILY
COMMUNITY
Start: 2021-09-20 | End: 2021-09-26

## 2021-09-23 RX ORDER — BLOOD-GLUCOSE METER
EACH MISCELLANEOUS
COMMUNITY
Start: 2021-05-11 | End: 2021-09-26

## 2021-09-23 RX ORDER — AMLODIPINE BESYLATE 5 MG/1
5 TABLET ORAL 2 TIMES DAILY
COMMUNITY
Start: 2021-07-13 | End: 2021-12-12

## 2021-09-26 RX ORDER — CLOTRIMAZOLE AND BETAMETHASONE DIPROPIONATE 10; .64 MG/G; MG/G
CREAM TOPICAL 2 TIMES DAILY
Qty: 45 G | Refills: 1 | Status: SHIPPED | OUTPATIENT
Start: 2021-09-26 | End: 2021-12-12

## 2021-12-07 ENCOUNTER — OFFICE VISIT (OUTPATIENT)
Dept: PODIATRY | Facility: CLINIC | Age: 51
End: 2021-12-07
Payer: MEDICAID

## 2021-12-07 VITALS
HEART RATE: 80 BPM | HEIGHT: 65 IN | RESPIRATION RATE: 18 BRPM | WEIGHT: 293 LBS | BODY MASS INDEX: 48.82 KG/M2 | SYSTOLIC BLOOD PRESSURE: 150 MMHG | DIASTOLIC BLOOD PRESSURE: 90 MMHG

## 2021-12-07 DIAGNOSIS — L85.3 DRY SKIN: ICD-10-CM

## 2021-12-07 DIAGNOSIS — B35.1 ONYCHOMYCOSIS OF TOENAIL: ICD-10-CM

## 2021-12-07 DIAGNOSIS — B35.3 TINEA PEDIS OF BOTH FEET: Primary | ICD-10-CM

## 2021-12-07 DIAGNOSIS — E11.49 TYPE II DIABETES MELLITUS WITH NEUROLOGICAL MANIFESTATIONS: ICD-10-CM

## 2021-12-07 PROCEDURE — 4010F ACE/ARB THERAPY RXD/TAKEN: CPT | Mod: CPTII,,, | Performed by: PODIATRIST

## 2021-12-07 PROCEDURE — 99999 PR PBB SHADOW E&M-EST. PATIENT-LVL IV: CPT | Mod: PBBFAC,,, | Performed by: PODIATRIST

## 2021-12-07 PROCEDURE — 4010F PR ACE/ARB THEARPY RXD/TAKEN: ICD-10-PCS | Mod: CPTII,,, | Performed by: PODIATRIST

## 2021-12-07 PROCEDURE — 99213 OFFICE O/P EST LOW 20 MIN: CPT | Mod: S$PBB,,, | Performed by: PODIATRIST

## 2021-12-07 PROCEDURE — 99999 PR PBB SHADOW E&M-EST. PATIENT-LVL IV: ICD-10-PCS | Mod: PBBFAC,,, | Performed by: PODIATRIST

## 2021-12-07 PROCEDURE — 99213 PR OFFICE/OUTPT VISIT, EST, LEVL III, 20-29 MIN: ICD-10-PCS | Mod: S$PBB,,, | Performed by: PODIATRIST

## 2021-12-07 PROCEDURE — 99214 OFFICE O/P EST MOD 30 MIN: CPT | Mod: PBBFAC | Performed by: PODIATRIST

## 2021-12-07 RX ORDER — METOPROLOL TARTRATE 25 MG/1
12.5 TABLET, FILM COATED ORAL 2 TIMES DAILY
COMMUNITY
Start: 2021-10-19 | End: 2021-12-12 | Stop reason: SDUPTHER

## 2021-12-07 RX ORDER — CHLORTHALIDONE 25 MG/1
25 TABLET ORAL
COMMUNITY
Start: 2021-12-06 | End: 2021-12-12 | Stop reason: SDUPTHER

## 2021-12-07 RX ORDER — FLUCONAZOLE 150 MG/1
TABLET ORAL
COMMUNITY
Start: 2021-09-23 | End: 2021-12-12

## 2021-12-07 RX ORDER — SOD SULF/POT CHLORIDE/MAG SULF 1.479 G
TABLET ORAL
COMMUNITY
Start: 2021-10-14 | End: 2021-12-12

## 2021-12-07 RX ORDER — CETIRIZINE HYDROCHLORIDE 10 MG/1
10 TABLET ORAL DAILY
COMMUNITY
Start: 2021-11-09 | End: 2023-10-03

## 2021-12-07 RX ORDER — SOD SULF/POT CHLORIDE/MAG SULF 1.479 G
TABLET ORAL
COMMUNITY
Start: 2021-09-27 | End: 2021-12-12

## 2021-12-07 RX ORDER — PHENTERMINE HYDROCHLORIDE 37.5 MG/1
37.5 TABLET ORAL DAILY
COMMUNITY
Start: 2021-11-15 | End: 2021-12-12

## 2021-12-07 RX ORDER — CETIRIZINE HYDROCHLORIDE 10 MG/1
10 TABLET ORAL
COMMUNITY
Start: 2021-11-08 | End: 2021-12-12 | Stop reason: SDUPTHER

## 2021-12-07 RX ORDER — CHLORTHALIDONE 25 MG/1
TABLET ORAL
COMMUNITY
Start: 2021-12-06 | End: 2022-03-11

## 2021-12-07 RX ORDER — LISINOPRIL 5 MG/1
5 TABLET ORAL DAILY
COMMUNITY
Start: 2021-11-09 | End: 2021-12-12 | Stop reason: ALTCHOICE

## 2021-12-07 RX ORDER — LISINOPRIL 20 MG/1
20 TABLET ORAL
Status: ON HOLD | COMMUNITY
Start: 2021-12-06 | End: 2022-06-13 | Stop reason: SDUPTHER

## 2021-12-07 RX ORDER — LISINOPRIL 10 MG/1
10 TABLET ORAL DAILY
COMMUNITY
Start: 2021-11-22 | End: 2021-12-12 | Stop reason: ALTCHOICE

## 2022-02-09 PROBLEM — L20.81 ATOPIC NEURODERMATITIS: Status: ACTIVE | Noted: 2022-02-09

## 2022-03-08 ENCOUNTER — OFFICE VISIT (OUTPATIENT)
Dept: PODIATRY | Facility: CLINIC | Age: 52
End: 2022-03-08
Payer: MEDICAID

## 2022-03-08 VITALS
HEIGHT: 65 IN | WEIGHT: 293 LBS | HEART RATE: 69 BPM | RESPIRATION RATE: 18 BRPM | BODY MASS INDEX: 48.82 KG/M2 | SYSTOLIC BLOOD PRESSURE: 188 MMHG | DIASTOLIC BLOOD PRESSURE: 92 MMHG

## 2022-03-08 DIAGNOSIS — B35.3 TINEA PEDIS OF BOTH FEET: ICD-10-CM

## 2022-03-08 DIAGNOSIS — R23.4 PEELING SKIN: ICD-10-CM

## 2022-03-08 DIAGNOSIS — B35.1 ONYCHOMYCOSIS OF TOENAIL: ICD-10-CM

## 2022-03-08 DIAGNOSIS — G57.93 NEUROPATHIC PAIN OF BOTH FEET: ICD-10-CM

## 2022-03-08 DIAGNOSIS — E11.9 COMPREHENSIVE DIABETIC FOOT EXAMINATION, TYPE 2 DM, ENCOUNTER FOR: Primary | ICD-10-CM

## 2022-03-08 PROCEDURE — 4010F PR ACE/ARB THEARPY RXD/TAKEN: ICD-10-PCS | Mod: CPTII,,, | Performed by: PODIATRIST

## 2022-03-08 PROCEDURE — 3008F PR BODY MASS INDEX (BMI) DOCUMENTED: ICD-10-PCS | Mod: CPTII,,, | Performed by: PODIATRIST

## 2022-03-08 PROCEDURE — 3077F SYST BP >= 140 MM HG: CPT | Mod: CPTII,,, | Performed by: PODIATRIST

## 2022-03-08 PROCEDURE — 1159F MED LIST DOCD IN RCRD: CPT | Mod: CPTII,,, | Performed by: PODIATRIST

## 2022-03-08 PROCEDURE — 99999 PR PBB SHADOW E&M-EST. PATIENT-LVL IV: CPT | Mod: PBBFAC,,, | Performed by: PODIATRIST

## 2022-03-08 PROCEDURE — 3077F PR MOST RECENT SYSTOLIC BLOOD PRESSURE >= 140 MM HG: ICD-10-PCS | Mod: CPTII,,, | Performed by: PODIATRIST

## 2022-03-08 PROCEDURE — 3080F PR MOST RECENT DIASTOLIC BLOOD PRESSURE >= 90 MM HG: ICD-10-PCS | Mod: CPTII,,, | Performed by: PODIATRIST

## 2022-03-08 PROCEDURE — 99214 OFFICE O/P EST MOD 30 MIN: CPT | Mod: PBBFAC | Performed by: PODIATRIST

## 2022-03-08 PROCEDURE — 4010F ACE/ARB THERAPY RXD/TAKEN: CPT | Mod: CPTII,,, | Performed by: PODIATRIST

## 2022-03-08 PROCEDURE — 3008F BODY MASS INDEX DOCD: CPT | Mod: CPTII,,, | Performed by: PODIATRIST

## 2022-03-08 PROCEDURE — 99999 PR PBB SHADOW E&M-EST. PATIENT-LVL IV: ICD-10-PCS | Mod: PBBFAC,,, | Performed by: PODIATRIST

## 2022-03-08 PROCEDURE — 1160F RVW MEDS BY RX/DR IN RCRD: CPT | Mod: CPTII,,, | Performed by: PODIATRIST

## 2022-03-08 PROCEDURE — 99214 OFFICE O/P EST MOD 30 MIN: CPT | Mod: S$PBB,,, | Performed by: PODIATRIST

## 2022-03-08 PROCEDURE — 1159F PR MEDICATION LIST DOCUMENTED IN MEDICAL RECORD: ICD-10-PCS | Mod: CPTII,,, | Performed by: PODIATRIST

## 2022-03-08 PROCEDURE — 99214 PR OFFICE/OUTPT VISIT, EST, LEVL IV, 30-39 MIN: ICD-10-PCS | Mod: S$PBB,,, | Performed by: PODIATRIST

## 2022-03-08 PROCEDURE — 3080F DIAST BP >= 90 MM HG: CPT | Mod: CPTII,,, | Performed by: PODIATRIST

## 2022-03-08 PROCEDURE — 1160F PR REVIEW ALL MEDS BY PRESCRIBER/CLIN PHARMACIST DOCUMENTED: ICD-10-PCS | Mod: CPTII,,, | Performed by: PODIATRIST

## 2022-03-08 RX ORDER — CLOTRIMAZOLE AND BETAMETHASONE DIPROPIONATE 10; .64 MG/G; MG/G
CREAM TOPICAL
COMMUNITY
Start: 2022-01-06 | End: 2022-03-11

## 2022-03-08 RX ORDER — NALOXONE HYDROCHLORIDE 4 MG/.1ML
SPRAY NASAL
COMMUNITY
Start: 2022-01-11 | End: 2023-05-08

## 2022-03-08 RX ORDER — EMPAGLIFLOZIN 10 MG/1
10 TABLET, FILM COATED ORAL DAILY
COMMUNITY
Start: 2022-02-24 | End: 2022-03-11 | Stop reason: SDUPTHER

## 2022-03-08 RX ORDER — TRIAMCINOLONE ACETONIDE 1 MG/G
OINTMENT TOPICAL
Status: ON HOLD | COMMUNITY
Start: 2022-02-16 | End: 2022-06-13

## 2022-03-08 RX ORDER — AMOXICILLIN AND CLAVULANATE POTASSIUM 875; 125 MG/1; MG/1
TABLET, FILM COATED ORAL
COMMUNITY
Start: 2022-01-12 | End: 2022-03-11

## 2022-03-08 RX ORDER — CITALOPRAM 20 MG/1
TABLET, FILM COATED ORAL
Status: ON HOLD | COMMUNITY
Start: 2022-02-11 | End: 2022-06-13

## 2022-03-08 RX ORDER — METHYLPREDNISOLONE 4 MG/1
TABLET ORAL
COMMUNITY
Start: 2022-01-12 | End: 2022-03-11

## 2022-03-08 RX ORDER — PROMETHAZINE HYDROCHLORIDE AND DEXTROMETHORPHAN HYDROBROMIDE 6.25; 15 MG/5ML; MG/5ML
5 SYRUP ORAL EVERY 6 HOURS PRN
COMMUNITY
Start: 2022-01-13 | End: 2022-03-11

## 2022-03-08 RX ORDER — CIPROFLOXACIN 500 MG/1
TABLET ORAL
COMMUNITY
Start: 2022-02-05 | End: 2022-03-11

## 2022-03-08 RX ORDER — DOXEPIN HYDROCHLORIDE 25 MG/1
CAPSULE ORAL
Status: ON HOLD | COMMUNITY
Start: 2022-01-11 | End: 2022-06-13

## 2022-03-08 RX ORDER — CLOBETASOL PROPIONATE 0.5 MG/G
CREAM TOPICAL 2 TIMES DAILY
Qty: 30 G | Refills: 1 | Status: SHIPPED | OUTPATIENT
Start: 2022-03-08 | End: 2022-05-20

## 2022-03-08 RX ORDER — HYDROCORTISONE 25 MG/G
CREAM TOPICAL
Status: ON HOLD | COMMUNITY
Start: 2021-08-05 | End: 2022-06-13

## 2022-03-08 RX ORDER — EMPAGLIFLOZIN 10 MG/1
10 TABLET, FILM COATED ORAL
Status: ON HOLD | COMMUNITY
Start: 2021-08-31 | End: 2022-06-13 | Stop reason: SDUPTHER

## 2022-03-12 NOTE — PROGRESS NOTES
Subjective:       Patient ID: Nely Salas is a 51 y.o. female.    Chief Complaint: Follow-up, Skin Problem, Diabetes Mellitus, and Diabetic Foot Exam  Patient presents with her  for annual diabetic foot exam, history type 2 diabetes with neuropathy. Takes 200 mg Lyrica 3 times daily and pain management. Relates continued improvement with dry skin, a lot of peeling, flaky skin.  Reports A1c was 7.8, glucose 155      Past Medical History:   Diagnosis Date    Diabetes mellitus, type 2     Diabetic neuropathy     Hypertension      Past Surgical History:   Procedure Laterality Date    CHOLECYSTECTOMY      FALLOPIAN TUBOPLASTY       History reviewed. No pertinent family history.  Social History     Socioeconomic History    Marital status:    Tobacco Use    Smoking status: Never Smoker    Smokeless tobacco: Never Used   Substance and Sexual Activity    Alcohol use: Yes     Comment: social    Drug use: Never    Sexual activity: Yes     Partners: Male       Current Outpatient Medications   Medication Sig Dispense Refill    cetirizine (ZYRTEC) 10 MG tablet Take 10 mg by mouth once daily.      citalopram (CELEXA) 20 MG tablet TAKE ONE TABLET BY MOUTH EVERY NIGHT AT BEDTIME AS DIRECTED      doxepin (SINEQUAN) 25 MG capsule       EASY TOUCH TWIST LANCETS 30 gauge Misc MONITOR BLOOD GLUCOSE DAILY.      empagliflozin (JARDIANCE) 10 mg tablet 10 mg.      hydrocortisone (ANUSOL-HC) 2.5 % rectal cream   See Instructions, Topical TID, # 30 gm, 0 Refill(s), Pharmacy: [facility], 165, cm, 08/05/21 8:42:00 CDT, Height/Length Measured, 149.6, kg, 08/05/21 8:42:00 CDT, Weight Dosing      lisinopriL (PRINIVIL,ZESTRIL) 20 MG tablet 20 mg.      oxyCODONE (ROXICODONE) 15 MG Tab Take 15 mg by mouth every 4 (four) hours as needed.      pregabalin (LYRICA) 200 MG Cap Take 200 mg by mouth 4 (four) times daily.      triamcinolone acetonide 0.1% (KENALOG) 0.1 % ointment APPLY TO THE SKIN TWICE  "DAILY. DO NOT APPLY TO THE FACE, UNDERARMS, OR GROIN.      clobetasoL (TEMOVATE) 0.05 % cream Apply topically 2 (two) times daily. 30 g 1    diclofenac sodium (VOLTAREN) 1 % Gel Apply 2 g topically 4 (four) times daily. 3 Tube 3    hydrOXYzine HCL (ATARAX) 25 MG tablet Take 25 mg by mouth 3 (three) times daily as needed for Itching.      naloxone (NARCAN) 4 mg/actuation Spry        Current Facility-Administered Medications   Medication Dose Route Frequency Provider Last Rate Last Admin    iron dextran (INFED) 200 mg in sodium chloride 0.9% 250 mL IVPB  200 mg Intravenous Q28 Days Germán Velasquez MD         Review of patient's allergies indicates:   Allergen Reactions    Bactrim [sulfamethoxazole-trimethoprim] Nausea And Vomiting    Zithromax [azithromycin] Nausea And Vomiting    Amoxicillin trihydrate Other (See Comments)    Morphine Hives    Sulfamethoxazole Other (See Comments)    Trimethoprim Other (See Comments)    Sulfa (sulfonamide antibiotics) Nausea And Vomiting       Review of Systems   HENT: Negative for congestion.    Respiratory: Negative for cough and shortness of breath.    Cardiovascular: Negative for leg swelling.   Musculoskeletal: Negative for gait problem.   All other systems reviewed and are negative.      Objective:      Vitals:    03/08/22 1453   BP: (!) 188/92   Pulse: 69   Resp: 18   Weight: (!) 143.8 kg (317 lb)   Height: 5' 5" (1.651 m)     Physical Exam  Vitals and nursing note reviewed. Exam conducted with a chaperone present.   Constitutional:       General: She is not in acute distress.  Cardiovascular:      Pulses:           Dorsalis pedis pulses are 2+ on the right side and 2+ on the left side.        Posterior tibial pulses are 2+ on the right side and 2+ on the left side.   Pulmonary:      Effort: Pulmonary effort is normal.   Musculoskeletal:      Right foot: No deformity.      Left foot: No deformity.   Feet:      Right foot:      Protective Sensation: 6 sites " tested. 6 sites sensed.      Skin integrity: Dry skin present.      Toenail Condition: Right toenails are abnormally thick. Fungal disease present.     Left foot:      Protective Sensation: 6 sites tested. 6 sites sensed.      Skin integrity: Dry skin (Dry peeling skin feet, improved, no evidence tinea pedis) present.      Toenail Condition: Left toenails are abnormally thick. Fungal disease present.  Skin:     General: Skin is dry.      Capillary Refill: Capillary refill takes less than 2 seconds.      Comments: Dry, peeling skin plantar feet secondary to chronic tinea pedis which is improved   Neurological:      General: No focal deficit present.      Comments: Sensation intact all areas bilateral feet with monofilament testing.  Painful paresthesias feet, diabetic neuropathy   Psychiatric:         Mood and Affect: Mood normal.         Behavior: Behavior normal.                                                  Assessment:       1. Comprehensive diabetic foot examination, type 2 DM, encounter for    2. DM type 2, uncontrolled, with neuropathy    3. Neuropathic pain of both feet    4. Tinea pedis of both feet    5. Peeling skin    6. Onychomycosis of toenail        Plan:         CLOBETASOL 0.5% APPLY ONCE DAILY  CONTINUE CLOTRIMAZOLE/BETAMETHASONE CR ONCE DAILY      Diabetic pedal exam performed.    Reviewed results monofilament testing, good sensation in feet.    Reviewed symptoms neuropathic foot pain  Reviewed diabetic education  Reviewed benefit of tight(er) control of glucose/diabetes     Reviewed appropriate shoes, especially indoors to support and protect feet   Advised patient most of athlete's foot seems to be resolved, decrease clotrimazole/betamethasone to once daily, preferably even morning.  To help resolve remaining peeling and dry skin prescribed clobetasol, apply once daily, preferably in the evening  Reviewed need for daily foot checks and instructed patient to contact the office with any area of  redness or swelling which has not improved within 3 days.  Patient/family were in understanding and agreement with treatment plan.  I counseled the patient on their conditions, implications and medical management.  Instructed patient/family to contact the office with any changes, questions, concerns, worsening of symptoms.   Total face to face time, exam, assessment, treatment, discussion, documentation 30 minutes, more than half this time spent on consultation and coordination of care.   Follow up as needed    This note was created using M*Modal voice recognition software that occasionally misinterpreted phrases or words.

## 2022-05-12 ENCOUNTER — TELEPHONE (OUTPATIENT)
Dept: ORTHOPEDICS | Facility: CLINIC | Age: 52
End: 2022-05-12
Payer: MEDICAID

## 2022-05-12 NOTE — TELEPHONE ENCOUNTER
Returned patient's call. I informed the patient I have not gotten her referral yet. I gave her the fax number for our [facility] and asked that the referral be faxed to that number.       ----- Message from Kylee Mccormick sent at 5/12/2022  2:29 PM CDT -----  Contact: pt  Type: Needs Medical Advice         Who Called pt  Best Call Back Number:[phone]  Additional Information: Requesting a call back regarding  pt is calling to see if office received her referral yet from SHANA Dowling . Pt would like office to call her.   Please Advise- Thank you

## 2022-05-13 ENCOUNTER — TELEPHONE (OUTPATIENT)
Dept: ORTHOPEDICS | Facility: CLINIC | Age: 52
End: 2022-05-13
Payer: MEDICAID

## 2022-05-13 NOTE — TELEPHONE ENCOUNTER
Returned call. Patient is scheduled for 6/8/22 at 1:45 P.M. in [facility].      ----- Message from Ion Sanchez sent at 5/13/2022 10:09 AM CDT -----  Regarding: past medicaid pt wants to [facility], call pt   Contact: pt   past medicaid pt wants to [facility], call pt

## 2022-05-17 ENCOUNTER — TELEPHONE (OUTPATIENT)
Dept: ORTHOPEDICS | Facility: CLINIC | Age: 52
End: 2022-05-17
Payer: MEDICAID

## 2022-05-17 NOTE — TELEPHONE ENCOUNTER
Returned call. Patient is scheduled for 6/1/22 at 3:30 P.M. in [facility]. Patient was agreeable to appointment      ----- Message from Aiden Max sent at 5/17/2022  8:14 AM CDT -----  Contact: patient  Type: Needs Medical Advice  Who Called:  patient  Symptoms (please be specific):  pain in left wrist/hand  How long has patient had these symptoms:  awhile  Pharmacy name and phone #:  n/a  Best Call Back Number: [phone]  Additional Information: patient called in and cancelled her appointment on 6/8/22 because her  has an appointment.  Patient stated she needs to be seen sooner anyway and would like a call back.  System would not allow us to schedule.

## 2022-05-26 DIAGNOSIS — M79.642 PAIN OF LEFT HAND: Primary | ICD-10-CM

## 2022-06-01 ENCOUNTER — HOSPITAL ENCOUNTER (OUTPATIENT)
Dept: RADIOLOGY | Facility: HOSPITAL | Age: 52
Discharge: HOME | End: 2022-06-01
Attending: ORTHOPAEDIC SURGERY
Payer: MEDICAID

## 2022-06-01 ENCOUNTER — OFFICE VISIT (OUTPATIENT)
Dept: ORTHOPEDICS | Facility: CLINIC | Age: 52
End: 2022-06-01
Payer: MEDICAID

## 2022-06-01 VITALS — WEIGHT: 293 LBS | HEIGHT: 65 IN | BODY MASS INDEX: 48.82 KG/M2 | RESPIRATION RATE: 19 BRPM

## 2022-06-01 DIAGNOSIS — M65.312 TRIGGER THUMB, LEFT THUMB: Primary | ICD-10-CM

## 2022-06-01 DIAGNOSIS — M79.642 PAIN OF LEFT HAND: ICD-10-CM

## 2022-06-01 DIAGNOSIS — Z01.818 PREOP TESTING: ICD-10-CM

## 2022-06-01 DIAGNOSIS — M79.645 THUMB PAIN, LEFT: ICD-10-CM

## 2022-06-01 PROCEDURE — 1159F MED LIST DOCD IN RCRD: CPT | Mod: CPTII,,, | Performed by: ORTHOPAEDIC SURGERY

## 2022-06-01 PROCEDURE — 4010F PR ACE/ARB THEARPY RXD/TAKEN: ICD-10-PCS | Mod: CPTII,,, | Performed by: ORTHOPAEDIC SURGERY

## 2022-06-01 PROCEDURE — 99213 OFFICE O/P EST LOW 20 MIN: CPT | Mod: PBBFAC | Performed by: ORTHOPAEDIC SURGERY

## 2022-06-01 PROCEDURE — 99214 PR OFFICE/OUTPT VISIT, EST, LEVL IV, 30-39 MIN: ICD-10-PCS | Mod: S$PBB,,, | Performed by: ORTHOPAEDIC SURGERY

## 2022-06-01 PROCEDURE — 99999 PR PBB SHADOW E&M-EST. PATIENT-LVL III: ICD-10-PCS | Mod: PBBFAC,,, | Performed by: ORTHOPAEDIC SURGERY

## 2022-06-01 PROCEDURE — 3008F BODY MASS INDEX DOCD: CPT | Mod: CPTII,,, | Performed by: ORTHOPAEDIC SURGERY

## 2022-06-01 PROCEDURE — 1159F PR MEDICATION LIST DOCUMENTED IN MEDICAL RECORD: ICD-10-PCS | Mod: CPTII,,, | Performed by: ORTHOPAEDIC SURGERY

## 2022-06-01 PROCEDURE — 73130 XR HAND COMPLETE 3 VIEW LEFT: ICD-10-PCS | Mod: 26,LT,, | Performed by: RADIOLOGY

## 2022-06-01 PROCEDURE — 73130 X-RAY EXAM OF HAND: CPT | Mod: TC,FY,LT

## 2022-06-01 PROCEDURE — 3008F PR BODY MASS INDEX (BMI) DOCUMENTED: ICD-10-PCS | Mod: CPTII,,, | Performed by: ORTHOPAEDIC SURGERY

## 2022-06-01 PROCEDURE — 73130 X-RAY EXAM OF HAND: CPT | Mod: 26,LT,, | Performed by: RADIOLOGY

## 2022-06-01 PROCEDURE — 99999 PR PBB SHADOW E&M-EST. PATIENT-LVL III: CPT | Mod: PBBFAC,,, | Performed by: ORTHOPAEDIC SURGERY

## 2022-06-01 PROCEDURE — 4010F ACE/ARB THERAPY RXD/TAKEN: CPT | Mod: CPTII,,, | Performed by: ORTHOPAEDIC SURGERY

## 2022-06-01 PROCEDURE — 99214 OFFICE O/P EST MOD 30 MIN: CPT | Mod: S$PBB,,, | Performed by: ORTHOPAEDIC SURGERY

## 2022-06-01 RX ORDER — TRAZODONE HYDROCHLORIDE 100 MG/1
200 TABLET ORAL NIGHTLY PRN
COMMUNITY
Start: 2022-05-03 | End: 2023-10-03

## 2022-06-01 RX ORDER — GABAPENTIN 800 MG/1
TABLET ORAL
COMMUNITY
Start: 2022-06-01 | End: 2023-04-13 | Stop reason: CLARIF

## 2022-06-01 RX ORDER — ZOLPIDEM TARTRATE 10 MG/1
10 TABLET ORAL NIGHTLY PRN
COMMUNITY
Start: 2022-05-03 | End: 2024-04-01

## 2022-06-01 NOTE — H&P
Chief Complaint:  Triggering left thumb     HPI:  Ms. Salas is a 52-year-old right-hand-dominant lady who presented today for evaluation of approximately 1 month of left thumb pain and catching.  She stated she awoke 1 morning and had the pain and catching she does not remember in a traumatic event.  She stated, it gets stuck and I have to pop it out.  It hurts real bad when I pop it .  Flexing her thumb increases her symptoms while keeping her thumb straight decreases them.  She has taken NSAIDs without help.  She has worn a splint which did not help.  She has not done occupational/physical therapy nor had injections.          Past Medical History:   Diagnosis Date     *  *  *  *  *  *  * Hypertension  Seasonal allergies  Anxiety  GERD  Headaches  Sickle cell trait  Fibromyalgia  Chronic pain management              Past Surgical History:   Procedure Laterality Date    CHOLECYSTECTOMY         *  * FALLOPIAN TUBOPLASTY    EGD                  Review of patient's allergies indicates:   Allergen Reactions    Bactrim [sulfamethoxazole-trimethoprim] Nausea And Vomiting    Zithromax [azithromycin] Nausea And Vomiting         Social History            Occupational History    UNEMPLOYED   Tobacco Use    Smoking status: Never Smoker    Smokeless tobacco: Never Used   Substance and Sexual Activity    Alcohol use: Yes       Frequency: Monthly or less    Drug use: Never    Sexual activity: Yes       Partners: Male      Family history:  Father:  Alive, stroke.  Mother:  Alive, hypothyroidism/hypertension.  Brother:  6, alive, stroke/diabetes.  Sister:  6, 1 , murder.  Son:  3, alive, denied medical problems.  Daughter:  2, alive, denied medical problems.     Previous Hospitalizations:  Childbirth.     ROS: No new diagnosis/surgery/prescriptions since last office visit on 2021.  Constitution: Negative for chills and fever.   HENT: Negative for congestion.    Eyes: Negative for blurred vision.    Cardiovascular: Negative for chest pain.   Respiratory: Negative for cough.    Endocrine: Negative for polydipsia.   Hematologic/Lymphatic: Negative for adenopathy.   Skin: Negative for flushing and itching.   Musculoskeletal: Positive for back pain and joint pain. Negative for gout.   Gastrointestinal: Positive for heartburn. Negative for constipation and diarrhea.   Genitourinary: Negative for nocturia.   Neurological: Positive for headaches. Negative for seizures.   Psychiatric/Behavioral: Negative for depression and substance abuse. The patient is nervous/anxious.    Allergic/Immunologic: Positive for environmental allergies.             Objective:   Physical Exam:   General: AAOx3.  No acute distress  HEENT: Normocephalic, PEARLA EOMI, edentulous  Neck: Supple, No JVD  Chest: Symetric, equal excursion on inspiration  Abdomen: Soft NTND  Vascular:  Pulses intact and equal bilaterally.  Capillary refill less than 3 seconds and equal bilaterally  Neurologic:  Pinprick and soft touch intact and equal bilaterally.    Integment:  No ecchymosis, no errythema  Extremity:  Wrist/hand:  Pronation/supination equal bilaterally 90/90 degrees.  Dorsiflexion/volar flexion equal bilaterally 75/70 degrees.  Nontender in the anatomic snuffbox bilaterally.  Nontender at the 1st dorsal compartment bilaterally.  No swelling at the 1st dorsal compartment bilaterally.  Finkelstein is negative bilaterally.  Grind test negative bilaterally.  Nontender at the scapholunate interval bilaterally.  Boyer's test negative bilaterally.  Nontender at the DRUJ/TFCC both wrists.  Ulna impaction test negative bilaterally.  Wartenberg sign negative bilateral hands.  Volar nodule palpable MCP left thumb.  Tender with palpation volar nodule left thumb.  Triggering left thumb.  Radiography:  Personally reviewed x-rays of the left hand completed on 06/01/2022 showed no fracture dislocation      Assessment:       Impression:       1. Trigger thumb,  left thumb.   2. Left thumb pain              Plan:       1.  Discussed physical examination and radiographic findings with the patient. Nely understands that she has a trigger thumb of her left thumb.  Treatment alternatives and outcomes were discussed with the patient she understands she could be treated conservative observation, activity modification, NSAIDs, bracing, physical/occupational therapy, injections, or she could consider surgical intervention such as A1 pulley release.  She was offered tendon sheath injection she stated she would prefer not to have injection would prefer to proceed with surgery.  2. Possible complications of surgery to include bleeding, infection, scarring, nerve/blood vessel/tendon damage, need for further surgery, failed surgery, failure to improve, possible persistent pain, possible stiffness, and possible recurrence were discussed with the patient.  The patient was permitted to ask questions and all concerns were addressed to her satisfaction.    3. Offered a steroid injection to the tendon sheath of the left thumb she declined.  4. Consent for A1 pulley release left thumb.  5. Tentatively schedule surgery for 06/13/2022.  6. She is to discuss postoperative pain management with her pain management physician.  7. Continue with NSAIDs as tolerated and allowed by PCM.  8. Patient was shown IP taping she understands she should taper fingers when she sleeps or is using her hands to decrease the amount of triggering.  9. Follow up approximately 10-12 days postoperatively.

## 2022-06-01 NOTE — H&P (VIEW-ONLY)
Chief Complaint:  Triggering left thumb     HPI:  Ms. Salas is a 52-year-old right-hand-dominant lady who presented today for evaluation of approximately 1 month of left thumb pain and catching.  She stated she awoke 1 morning and had the pain and catching she does not remember in a traumatic event.  She stated, it gets stuck and I have to pop it out.  It hurts real bad when I pop it .  Flexing her thumb increases her symptoms while keeping her thumb straight decreases them.  She has taken NSAIDs without help.  She has worn a splint which did not help.  She has not done occupational/physical therapy nor had injections.          Past Medical History:   Diagnosis Date     *  *  *  *  *  *  * Hypertension  Seasonal allergies  Anxiety  GERD  Headaches  Sickle cell trait  Fibromyalgia  Chronic pain management              Past Surgical History:   Procedure Laterality Date    CHOLECYSTECTOMY         *  * FALLOPIAN TUBOPLASTY    EGD                  Review of patient's allergies indicates:   Allergen Reactions    Bactrim [sulfamethoxazole-trimethoprim] Nausea And Vomiting    Zithromax [azithromycin] Nausea And Vomiting         Social History            Occupational History    UNEMPLOYED   Tobacco Use    Smoking status: Never Smoker    Smokeless tobacco: Never Used   Substance and Sexual Activity    Alcohol use: Yes       Frequency: Monthly or less    Drug use: Never    Sexual activity: Yes       Partners: Male      Family history:  Father:  Alive, stroke.  Mother:  Alive, hypothyroidism/hypertension.  Brother:  6, alive, stroke/diabetes.  Sister:  6, 1 , murder.  Son:  3, alive, denied medical problems.  Daughter:  2, alive, denied medical problems.     Previous Hospitalizations:  Childbirth.     ROS: No new diagnosis/surgery/prescriptions since last office visit on 2021.  Constitution: Negative for chills and fever.   HENT: Negative for congestion.    Eyes: Negative for blurred vision.    Cardiovascular: Negative for chest pain.   Respiratory: Negative for cough.    Endocrine: Negative for polydipsia.   Hematologic/Lymphatic: Negative for adenopathy.   Skin: Negative for flushing and itching.   Musculoskeletal: Positive for back pain and joint pain. Negative for gout.   Gastrointestinal: Positive for heartburn. Negative for constipation and diarrhea.   Genitourinary: Negative for nocturia.   Neurological: Positive for headaches. Negative for seizures.   Psychiatric/Behavioral: Negative for depression and substance abuse. The patient is nervous/anxious.    Allergic/Immunologic: Positive for environmental allergies.             Objective:   Physical Exam:   General: AAOx3.  No acute distress  HEENT: Normocephalic, PEARLA EOMI, edentulous  Neck: Supple, No JVD  Chest: Symetric, equal excursion on inspiration  Abdomen: Soft NTND  Vascular:  Pulses intact and equal bilaterally.  Capillary refill less than 3 seconds and equal bilaterally  Neurologic:  Pinprick and soft touch intact and equal bilaterally.    Integment:  No ecchymosis, no errythema  Extremity:  Wrist/hand:  Pronation/supination equal bilaterally 90/90 degrees.  Dorsiflexion/volar flexion equal bilaterally 75/70 degrees.  Nontender in the anatomic snuffbox bilaterally.  Nontender at the 1st dorsal compartment bilaterally.  No swelling at the 1st dorsal compartment bilaterally.  Finkelstein is negative bilaterally.  Grind test negative bilaterally.  Nontender at the scapholunate interval bilaterally.  Boyer's test negative bilaterally.  Nontender at the DRUJ/TFCC both wrists.  Ulna impaction test negative bilaterally.  Wartenberg sign negative bilateral hands.  Volar nodule palpable MCP left thumb.  Tender with palpation volar nodule left thumb.  Triggering left thumb.  Radiography:  Personally reviewed x-rays of the left hand completed on 06/01/2022 showed no fracture dislocation      Assessment:       Impression:       1. Trigger thumb,  left thumb.   2. Left thumb pain              Plan:       1.  Discussed physical examination and radiographic findings with the patient. Nely understands that she has a trigger thumb of her left thumb.  Treatment alternatives and outcomes were discussed with the patient she understands she could be treated conservative observation, activity modification, NSAIDs, bracing, physical/occupational therapy, injections, or she could consider surgical intervention such as A1 pulley release.  She was offered tendon sheath injection she stated she would prefer not to have injection would prefer to proceed with surgery.  2. Possible complications of surgery to include bleeding, infection, scarring, nerve/blood vessel/tendon damage, need for further surgery, failed surgery, failure to improve, possible persistent pain, possible stiffness, and possible recurrence were discussed with the patient.  The patient was permitted to ask questions and all concerns were addressed to her satisfaction.    3. Offered a steroid injection to the tendon sheath of the left thumb she declined.  4. Consent for A1 pulley release left thumb.  5. Tentatively schedule surgery for 06/13/2022.  6. She is to discuss postoperative pain management with her pain management physician.  7. Continue with NSAIDs as tolerated and allowed by PCM.  8. Patient was shown IP taping she understands she should taper fingers when she sleeps or is using her hands to decrease the amount of triggering.  9. Follow up approximately 10-12 days postoperatively.     unable to assess

## 2022-06-01 NOTE — PROGRESS NOTES
Patient ID: Nely Salas is a 49 y.o. female.     Chief Complaint: Pain of the Right Hip and Pain of the Left Hip     Referring Provider: Germán Velasquez Md  [address] [place] [address], [place] [id number]     HPI:  Ms. Salas is a 52-year-old right-hand-dominant lady who presented today for evaluation of approximately 1 month of left thumb pain and catching.  She stated she awoke 1 morning and had the pain and catching she does not remember in a traumatic event.  She stated, it gets stuck and I have to pop it out.  It hurts real bad when I pop it .  Flexing her thumb increases her symptoms while keeping her thumb straight decreases them.  She has taken NSAIDs without help.  She has worn a splint which did not help.  She has not done occupational/physical therapy nor had injections.          Past Medical History:   Diagnosis Date     *  *  *  *  *  *  * Hypertension  Seasonal allergies  Anxiety  GERD  Headaches  Sickle cell trait  Fibromyalgia  Chronic pain management              Past Surgical History:   Procedure Laterality Date    CHOLECYSTECTOMY         *  * FALLOPIAN TUBOPLASTY    EGD                  Review of patient's allergies indicates:   Allergen Reactions    Bactrim [sulfamethoxazole-trimethoprim] Nausea And Vomiting    Zithromax [azithromycin] Nausea And Vomiting         Social History            Occupational History    UNEMPLOYED   Tobacco Use    Smoking status: Never Smoker    Smokeless tobacco: Never Used   Substance and Sexual Activity    Alcohol use: Yes       Frequency: Monthly or less    Drug use: Never    Sexual activity: Yes       Partners: Male      Family history:  Father:  Alive, stroke.  Mother:  Alive, hypothyroidism/hypertension.  Brother:  6, alive, stroke/diabetes.  Sister:  6, 1 , murder.  Son:  3, alive, denied medical problems.  Daughter:  2, alive, denied medical problems.     Previous Hospitalizations:  Childbirth.     ROS: No new  diagnosis/surgery/prescriptions since last office visit on 07/09/2021.  Constitution: Negative for chills and fever.   HENT: Negative for congestion.    Eyes: Negative for blurred vision.   Cardiovascular: Negative for chest pain.   Respiratory: Negative for cough.    Endocrine: Negative for polydipsia.   Hematologic/Lymphatic: Negative for adenopathy.   Skin: Negative for flushing and itching.   Musculoskeletal: Positive for back pain and joint pain. Negative for gout.   Gastrointestinal: Positive for heartburn. Negative for constipation and diarrhea.   Genitourinary: Negative for nocturia.   Neurological: Positive for headaches. Negative for seizures.   Psychiatric/Behavioral: Negative for depression and substance abuse. The patient is nervous/anxious.    Allergic/Immunologic: Positive for environmental allergies.             Objective:   Physical Exam:   General: AAOx3.  No acute distress  HEENT: Normocephalic, PEARLA EOMI, edentulous  Neck: Supple, No JVD  Chest: Symetric, equal excursion on inspiration  Abdomen: Soft NTND  Vascular:  Pulses intact and equal bilaterally.  Capillary refill less than 3 seconds and equal bilaterally  Neurologic:  Pinprick and soft touch intact and equal bilaterally.    Integment:  No ecchymosis, no errythema  Extremity:  Wrist/hand:  Pronation/supination equal bilaterally 90/90 degrees.  Dorsiflexion/volar flexion equal bilaterally 75/70 degrees.  Nontender in the anatomic snuffbox bilaterally.  Nontender at the 1st dorsal compartment bilaterally.  No swelling at the 1st dorsal compartment bilaterally.  Finkelstein is negative bilaterally.  Grind test negative bilaterally.  Nontender at the scapholunate interval bilaterally.  Boyer's test negative bilaterally.  Nontender at the DRUJ/TFCC both wrists.  Ulna impaction test negative bilaterally.  Wartenberg sign negative bilateral hands.  Volar nodule palpable MCP left thumb.  Tender with palpation volar nodule left thumb.   Triggering left thumb.  Radiography:  Personally reviewed x-rays of the left hand completed on 06/01/2022 showed no fracture dislocation      Assessment:       Impression:       1. Trigger thumb, left thumb.   2. Left thumb pain              Plan:       1.  Discussed physical examination and radiographic findings with the patient. Nely understands that she has a trigger thumb of her left thumb.  Treatment alternatives and outcomes were discussed with the patient she understands she could be treated conservative observation, activity modification, NSAIDs, bracing, physical/occupational therapy, injections, or she could consider surgical intervention such as A1 pulley release.  She was offered tendon sheath injection she stated she would prefer not to have injection would prefer to proceed with surgery.  2. Possible complications of surgery to include bleeding, infection, scarring, nerve/blood vessel/tendon damage, need for further surgery, failed surgery, failure to improve, possible persistent pain, possible stiffness, and possible recurrence were discussed with the patient.  The patient was permitted to ask questions and all concerns were addressed to her satisfaction.    3. Offered a steroid injection to the tendon sheath of the left thumb she declined.  4. Consent for A1 pulley release left thumb.  5. Tentatively schedule surgery for 06/13/2022.  6. She is to discuss postoperative pain management with her pain management physician.  7. Continue with NSAIDs as tolerated and allowed by PCM.  8. Patient was shown IP taping she understands she should taper fingers when she sleeps or is using her hands to decrease the amount of triggering.  9. Follow up approximately 10-12 days postoperatively.

## 2022-06-06 ENCOUNTER — ANESTHESIA EVENT (OUTPATIENT)
Dept: SURGERY | Facility: HOSPITAL | Age: 52
End: 2022-06-06
Payer: MEDICAID

## 2022-06-06 ENCOUNTER — HOSPITAL ENCOUNTER (OUTPATIENT)
Dept: PREADMISSION TESTING | Facility: HOSPITAL | Age: 52
Discharge: HOME | End: 2022-06-06
Attending: INTERNAL MEDICINE
Payer: MEDICAID

## 2022-06-06 NOTE — ANESTHESIA PREPROCEDURE EVALUATION
06/06/2022  Nely Salas is a 52 y.o., female.      Pre-op Assessment    I have reviewed the Patient Summary Reports.     I have reviewed the Nursing Notes. I have reviewed the NPO Status.   I have reviewed the Medications.     Review of Systems  Anesthesia Hx:  No problems with previous Anesthesia  Neg history of prior surgery. Denies Family Hx of Anesthesia complications.   Denies Personal Hx of Anesthesia complications.   Social:  Non-Smoker    Hematology/Oncology:  Hematology Normal   Oncology Normal     EENT/Dental:EENT/Dental Normal   Cardiovascular:   Hypertension, well controlled    Pulmonary:   Sleep Apnea    Education provided regarding risk of obstructive sleep apnea     Renal/:  Renal/ Normal     Hepatic/GI:   GERD, well controlled    Musculoskeletal:   Arthritis   Spine Disorders: lumbar    Neurological:   Neuromuscular Disease,   Chronic Pain Syndrome  Peripheral Neuropathy    Endocrine:   Diabetes, well controlled, type 2    Dermatological:  Skin Normal    Psych:  Psychiatric Normal           Physical Exam  General: Alert    Airway:  Mallampati: III   Mouth Opening: Normal  TM Distance: Normal  Neck ROM: Normal ROM    Dental:  Dentures, Edentulous    Chest/Lungs:  Clear to auscultation, Normal Respiratory Rate    Heart:  Rate: Normal    Abdomen:  Normal        Anesthesia Plan  Type of Anesthesia, risks & benefits discussed:    Anesthesia Type: Gen ETT  Intra-op Monitoring Plan: Standard ASA Monitors  Post Op Pain Control Plan: multimodal analgesia  Airway Plan: Direct, Post-Induction  Informed Consent: Informed consent signed with the Patient and all parties understand the risks and agree with anesthesia plan.  All questions answered. Patient consented to blood products? No  ASA Score: 3  Day of Surgery Review of History & Physical: H&P Update referred to the surgeon/provider.    Ready For  Surgery From Anesthesia Perspective.     .

## 2022-06-07 ENCOUNTER — OFFICE VISIT (OUTPATIENT)
Dept: PODIATRY | Facility: CLINIC | Age: 52
End: 2022-06-07
Payer: MEDICAID

## 2022-06-07 VITALS
RESPIRATION RATE: 16 BRPM | HEART RATE: 67 BPM | WEIGHT: 293 LBS | DIASTOLIC BLOOD PRESSURE: 85 MMHG | BODY MASS INDEX: 48.82 KG/M2 | HEIGHT: 65 IN | SYSTOLIC BLOOD PRESSURE: 151 MMHG

## 2022-06-07 DIAGNOSIS — B35.1 ONYCHOMYCOSIS OF TOENAIL: ICD-10-CM

## 2022-06-07 DIAGNOSIS — G57.93 NEUROPATHIC PAIN OF BOTH FEET: ICD-10-CM

## 2022-06-07 DIAGNOSIS — B35.3 TINEA PEDIS OF BOTH FEET: Primary | ICD-10-CM

## 2022-06-07 PROCEDURE — 3077F PR MOST RECENT SYSTOLIC BLOOD PRESSURE >= 140 MM HG: ICD-10-PCS | Mod: CPTII,,, | Performed by: PODIATRIST

## 2022-06-07 PROCEDURE — 1159F MED LIST DOCD IN RCRD: CPT | Mod: CPTII,,, | Performed by: PODIATRIST

## 2022-06-07 PROCEDURE — 99999 PR PBB SHADOW E&M-EST. PATIENT-LVL IV: ICD-10-PCS | Mod: PBBFAC,,, | Performed by: PODIATRIST

## 2022-06-07 PROCEDURE — 1160F RVW MEDS BY RX/DR IN RCRD: CPT | Mod: CPTII,,, | Performed by: PODIATRIST

## 2022-06-07 PROCEDURE — 99213 OFFICE O/P EST LOW 20 MIN: CPT | Mod: S$PBB,,, | Performed by: PODIATRIST

## 2022-06-07 PROCEDURE — 1160F PR REVIEW ALL MEDS BY PRESCRIBER/CLIN PHARMACIST DOCUMENTED: ICD-10-PCS | Mod: CPTII,,, | Performed by: PODIATRIST

## 2022-06-07 PROCEDURE — 99999 PR PBB SHADOW E&M-EST. PATIENT-LVL IV: CPT | Mod: PBBFAC,,, | Performed by: PODIATRIST

## 2022-06-07 PROCEDURE — 1159F PR MEDICATION LIST DOCUMENTED IN MEDICAL RECORD: ICD-10-PCS | Mod: CPTII,,, | Performed by: PODIATRIST

## 2022-06-07 PROCEDURE — 3079F DIAST BP 80-89 MM HG: CPT | Mod: CPTII,,, | Performed by: PODIATRIST

## 2022-06-07 PROCEDURE — 4010F PR ACE/ARB THEARPY RXD/TAKEN: ICD-10-PCS | Mod: CPTII,,, | Performed by: PODIATRIST

## 2022-06-07 PROCEDURE — 99214 OFFICE O/P EST MOD 30 MIN: CPT | Mod: PBBFAC | Performed by: PODIATRIST

## 2022-06-07 PROCEDURE — 3008F PR BODY MASS INDEX (BMI) DOCUMENTED: ICD-10-PCS | Mod: CPTII,,, | Performed by: PODIATRIST

## 2022-06-07 PROCEDURE — 3077F SYST BP >= 140 MM HG: CPT | Mod: CPTII,,, | Performed by: PODIATRIST

## 2022-06-07 PROCEDURE — 3079F PR MOST RECENT DIASTOLIC BLOOD PRESSURE 80-89 MM HG: ICD-10-PCS | Mod: CPTII,,, | Performed by: PODIATRIST

## 2022-06-07 PROCEDURE — 99213 PR OFFICE/OUTPT VISIT, EST, LEVL III, 20-29 MIN: ICD-10-PCS | Mod: S$PBB,,, | Performed by: PODIATRIST

## 2022-06-07 PROCEDURE — 3008F BODY MASS INDEX DOCD: CPT | Mod: CPTII,,, | Performed by: PODIATRIST

## 2022-06-07 PROCEDURE — 4010F ACE/ARB THERAPY RXD/TAKEN: CPT | Mod: CPTII,,, | Performed by: PODIATRIST

## 2022-06-07 RX ORDER — EMPAGLIFLOZIN 10 MG/1
10 TABLET, FILM COATED ORAL
COMMUNITY
Start: 2022-04-11 | End: 2023-04-13 | Stop reason: CLARIF

## 2022-06-07 RX ORDER — DOXEPIN HYDROCHLORIDE 25 MG/1
25 CAPSULE ORAL
Status: ON HOLD | COMMUNITY
Start: 2022-01-11 | End: 2022-06-13

## 2022-06-07 RX ORDER — TRAZODONE HYDROCHLORIDE 100 MG/1
100 TABLET ORAL
Status: ON HOLD | COMMUNITY
Start: 2022-05-03 | End: 2022-06-13 | Stop reason: SDUPTHER

## 2022-06-07 RX ORDER — CITALOPRAM 20 MG/1
20 TABLET, FILM COATED ORAL
Status: ON HOLD | COMMUNITY
Start: 2022-03-10 | End: 2022-06-13

## 2022-06-07 RX ORDER — DICLOFENAC SODIUM 10 MG/G
2 GEL TOPICAL
COMMUNITY
Start: 2022-05-06 | End: 2022-09-17

## 2022-06-07 RX ORDER — CLOBETASOL PROPIONATE 0.5 MG/G
1 CREAM TOPICAL 2 TIMES DAILY
Status: ON HOLD | COMMUNITY
Start: 2022-05-05 | End: 2022-06-13

## 2022-06-07 RX ORDER — LISINOPRIL 20 MG/1
20 TABLET ORAL
COMMUNITY
Start: 2022-04-11 | End: 2022-09-17 | Stop reason: SDUPTHER

## 2022-06-07 RX ORDER — ZOLPIDEM TARTRATE 10 MG/1
10 TABLET ORAL
Status: ON HOLD | COMMUNITY
Start: 2022-05-03 | End: 2022-06-13

## 2022-06-07 RX ORDER — GABAPENTIN 600 MG/1
600 TABLET ORAL 3 TIMES DAILY
Status: ON HOLD | COMMUNITY
Start: 2022-04-05 | End: 2022-06-13 | Stop reason: SDUPTHER

## 2022-06-07 RX ORDER — GABAPENTIN 400 MG/1
400 CAPSULE ORAL 3 TIMES DAILY
Status: ON HOLD | COMMUNITY
Start: 2022-03-08 | End: 2022-06-13 | Stop reason: ALTCHOICE

## 2022-06-07 RX ORDER — GABAPENTIN 800 MG/1
800 TABLET ORAL
Status: ON HOLD | COMMUNITY
Start: 2022-05-03 | End: 2022-06-13 | Stop reason: SDUPTHER

## 2022-06-13 ENCOUNTER — ANESTHESIA (OUTPATIENT)
Dept: SURGERY | Facility: HOSPITAL | Age: 52
End: 2022-06-13
Payer: MEDICAID

## 2022-06-13 ENCOUNTER — HOSPITAL ENCOUNTER (OUTPATIENT)
Facility: HOSPITAL | Age: 52
Discharge: HOME | End: 2022-06-13
Attending: ORTHOPAEDIC SURGERY | Admitting: ORTHOPAEDIC SURGERY
Payer: MEDICAID

## 2022-06-13 DIAGNOSIS — M65.312 TRIGGER THUMB, LEFT THUMB: Primary | ICD-10-CM

## 2022-06-13 LAB — POCT GLUCOSE: 146 MG/DL (ref 70–110)

## 2022-06-13 PROCEDURE — 71000033 HC RECOVERY, INTIAL HOUR: Performed by: ORTHOPAEDIC SURGERY

## 2022-06-13 PROCEDURE — 63600175 PHARM REV CODE 636 W HCPCS: Performed by: NURSE ANESTHETIST, CERTIFIED REGISTERED

## 2022-06-13 PROCEDURE — 71000015 HC POSTOP RECOV 1ST HR: Performed by: ORTHOPAEDIC SURGERY

## 2022-06-13 PROCEDURE — 01810 ANES PX NRV MUSC F/ARM WRST: CPT | Performed by: ORTHOPAEDIC SURGERY

## 2022-06-13 PROCEDURE — D9220A PRA ANESTHESIA: Mod: ANES,,, | Performed by: ANESTHESIOLOGY

## 2022-06-13 PROCEDURE — 37000008 HC ANESTHESIA 1ST 15 MINUTES: Performed by: ORTHOPAEDIC SURGERY

## 2022-06-13 PROCEDURE — 25000003 PHARM REV CODE 250: Performed by: NURSE ANESTHETIST, CERTIFIED REGISTERED

## 2022-06-13 PROCEDURE — 26055 PR INCISE FINGER TENDON SHEATH: ICD-10-PCS | Mod: FA,,, | Performed by: ORTHOPAEDIC SURGERY

## 2022-06-13 PROCEDURE — 36000706: Performed by: ORTHOPAEDIC SURGERY

## 2022-06-13 PROCEDURE — D9220A PRA ANESTHESIA: ICD-10-PCS | Mod: CRNA,,, | Performed by: NURSE ANESTHETIST, CERTIFIED REGISTERED

## 2022-06-13 PROCEDURE — 25000003 PHARM REV CODE 250

## 2022-06-13 PROCEDURE — 37000009 HC ANESTHESIA EA ADD 15 MINS: Performed by: ORTHOPAEDIC SURGERY

## 2022-06-13 PROCEDURE — 63600175 PHARM REV CODE 636 W HCPCS: Performed by: ANESTHESIOLOGY

## 2022-06-13 PROCEDURE — D9220A PRA ANESTHESIA: ICD-10-PCS | Mod: ANES,,, | Performed by: ANESTHESIOLOGY

## 2022-06-13 PROCEDURE — 63600175 PHARM REV CODE 636 W HCPCS: Performed by: ORTHOPAEDIC SURGERY

## 2022-06-13 PROCEDURE — 36000707: Performed by: ORTHOPAEDIC SURGERY

## 2022-06-13 PROCEDURE — 26055 INCISE FINGER TENDON SHEATH: CPT | Mod: FA,,, | Performed by: ORTHOPAEDIC SURGERY

## 2022-06-13 PROCEDURE — D9220A PRA ANESTHESIA: Mod: CRNA,,, | Performed by: NURSE ANESTHETIST, CERTIFIED REGISTERED

## 2022-06-13 RX ORDER — SODIUM CHLORIDE, SODIUM LACTATE, POTASSIUM CHLORIDE, CALCIUM CHLORIDE 600; 310; 30; 20 MG/100ML; MG/100ML; MG/100ML; MG/100ML
125 INJECTION, SOLUTION INTRAVENOUS CONTINUOUS
Status: DISCONTINUED | OUTPATIENT
Start: 2022-06-13 | End: 2022-06-13 | Stop reason: HOSPADM

## 2022-06-13 RX ORDER — CEFAZOLIN SODIUM 2 G/50ML
2 SOLUTION INTRAVENOUS ONCE
Status: COMPLETED | OUTPATIENT
Start: 2022-06-13 | End: 2022-06-13

## 2022-06-13 RX ORDER — ONDANSETRON 2 MG/ML
4 INJECTION INTRAMUSCULAR; INTRAVENOUS DAILY PRN
Status: DISCONTINUED | OUTPATIENT
Start: 2022-06-13 | End: 2022-06-13 | Stop reason: HOSPADM

## 2022-06-13 RX ORDER — LIDOCAINE HYDROCHLORIDE 20 MG/ML
INJECTION, SOLUTION EPIDURAL; INFILTRATION; INTRACAUDAL; PERINEURAL
Status: DISCONTINUED | OUTPATIENT
Start: 2022-06-13 | End: 2022-06-13

## 2022-06-13 RX ORDER — MEPERIDINE HYDROCHLORIDE 50 MG/ML
INJECTION INTRAMUSCULAR; INTRAVENOUS; SUBCUTANEOUS
Status: DISCONTINUED | OUTPATIENT
Start: 2022-06-13 | End: 2022-06-13

## 2022-06-13 RX ORDER — HYDROGEN PEROXIDE 3 %
20 SOLUTION, NON-ORAL MISCELLANEOUS
COMMUNITY
End: 2023-07-10

## 2022-06-13 RX ORDER — PROPOFOL 10 MG/ML
VIAL (ML) INTRAVENOUS
Status: DISCONTINUED | OUTPATIENT
Start: 2022-06-13 | End: 2022-06-13

## 2022-06-13 RX ORDER — ONDANSETRON 2 MG/ML
INJECTION INTRAMUSCULAR; INTRAVENOUS
Status: DISCONTINUED | OUTPATIENT
Start: 2022-06-13 | End: 2022-06-13

## 2022-06-13 RX ORDER — MIDAZOLAM HYDROCHLORIDE 1 MG/ML
INJECTION INTRAMUSCULAR; INTRAVENOUS
Status: DISCONTINUED | OUTPATIENT
Start: 2022-06-13 | End: 2022-06-13

## 2022-06-13 RX ORDER — LIDOCAINE HYDROCHLORIDE 10 MG/ML
1 INJECTION, SOLUTION EPIDURAL; INFILTRATION; INTRACAUDAL; PERINEURAL ONCE
Status: DISCONTINUED | OUTPATIENT
Start: 2022-06-13 | End: 2022-06-13 | Stop reason: HOSPADM

## 2022-06-13 RX ORDER — FAMOTIDINE 10 MG/ML
INJECTION INTRAVENOUS
Status: COMPLETED
Start: 2022-06-13 | End: 2022-06-13

## 2022-06-13 RX ORDER — HYDROMORPHONE HYDROCHLORIDE 2 MG/ML
0.2 INJECTION, SOLUTION INTRAMUSCULAR; INTRAVENOUS; SUBCUTANEOUS EVERY 5 MIN PRN
Status: DISCONTINUED | OUTPATIENT
Start: 2022-06-13 | End: 2022-06-13 | Stop reason: HOSPADM

## 2022-06-13 RX ORDER — SUCCINYLCHOLINE CHLORIDE 20 MG/ML
INJECTION INTRAMUSCULAR; INTRAVENOUS
Status: DISCONTINUED | OUTPATIENT
Start: 2022-06-13 | End: 2022-06-13

## 2022-06-13 RX ORDER — SODIUM CHLORIDE, SODIUM LACTATE, POTASSIUM CHLORIDE, CALCIUM CHLORIDE 600; 310; 30; 20 MG/100ML; MG/100ML; MG/100ML; MG/100ML
INJECTION, SOLUTION INTRAVENOUS CONTINUOUS
Status: DISCONTINUED | OUTPATIENT
Start: 2022-06-13 | End: 2022-06-13 | Stop reason: HOSPADM

## 2022-06-13 RX ORDER — ROCURONIUM BROMIDE 10 MG/ML
INJECTION, SOLUTION INTRAVENOUS
Status: DISCONTINUED | OUTPATIENT
Start: 2022-06-13 | End: 2022-06-13

## 2022-06-13 RX ORDER — FAMOTIDINE 10 MG/ML
20 INJECTION INTRAVENOUS ONCE
Status: COMPLETED | OUTPATIENT
Start: 2022-06-13 | End: 2022-06-13

## 2022-06-13 RX ORDER — DIPHENHYDRAMINE HYDROCHLORIDE 50 MG/ML
12.5 INJECTION INTRAMUSCULAR; INTRAVENOUS
Status: DISCONTINUED | OUTPATIENT
Start: 2022-06-13 | End: 2022-06-13 | Stop reason: HOSPADM

## 2022-06-13 RX ADMIN — LIDOCAINE HYDROCHLORIDE 80 MG: 20 INJECTION, SOLUTION EPIDURAL; INFILTRATION; INTRACAUDAL; PERINEURAL at 10:06

## 2022-06-13 RX ADMIN — MIDAZOLAM HYDROCHLORIDE 2 MG: 1 INJECTION, SOLUTION INTRAMUSCULAR; INTRAVENOUS at 10:06

## 2022-06-13 RX ADMIN — SUCCINYLCHOLINE CHLORIDE 150 MG: 20 INJECTION, SOLUTION INTRAMUSCULAR; INTRAVENOUS at 10:06

## 2022-06-13 RX ADMIN — PROPOFOL 200 MG: 10 INJECTION, EMULSION INTRAVENOUS at 10:06

## 2022-06-13 RX ADMIN — SODIUM CHLORIDE, SODIUM LACTATE, POTASSIUM CHLORIDE, AND CALCIUM CHLORIDE: .6; .31; .03; .02 INJECTION, SOLUTION INTRAVENOUS at 08:06

## 2022-06-13 RX ADMIN — MEPERIDINE HYDROCHLORIDE 50 MG: 50 INJECTION INTRAMUSCULAR; INTRAVENOUS; SUBCUTANEOUS at 10:06

## 2022-06-13 RX ADMIN — FAMOTIDINE 20 MG: 10 INJECTION INTRAVENOUS at 08:06

## 2022-06-13 RX ADMIN — ONDANSETRON 4 MG: 2 INJECTION INTRAMUSCULAR; INTRAVENOUS at 10:06

## 2022-06-13 RX ADMIN — ROCURONIUM BROMIDE 30 MG: 10 INJECTION, SOLUTION INTRAVENOUS at 10:06

## 2022-06-13 RX ADMIN — CEFAZOLIN SODIUM 2 G: 2 SOLUTION INTRAVENOUS at 10:06

## 2022-06-13 NOTE — DISCHARGE SUMMARY
[facility] Surgery  Discharge Note  Short Stay    Procedure(s) (LRB):  TRIGGER FINGER RELEASE LEFT THUMB (Left)    OUTCOME: Patient tolerated treatment/procedure well without complication and is now ready for discharge.    DISPOSITION: Home or Self Care    FINAL DIAGNOSIS:  Trigger thumb, left thumb    FOLLOWUP: In clinic    DISCHARGE INSTRUCTIONS:    Discharge Procedure Orders   Diet Adult Regular     Keep surgical extremity elevated     Ice to affected area     Lifting restrictions   Order Comments: One-handed activities with the right hand no lifting/pushing/pulling/climbing requiring the use of the left hand.  No activities on ladders/scaffolding/stairs     Other restrictions (specify):   Order Comments: 1. Elevate and ice left hand.  2. May remove dressing in 3 days.  May shower after removal of dressing, do not soak in a tub.  Place Band-Aid over incision after removal of dressing.  3. One-handed activities with the right hand only no lifting/pushing/pulling/climbing requiring the use of the left hand.     Notify your health care provider if you experience any of the following:  temperature >100.4      Remove dressing in 72 hours   Order Comments: May remove dressing in 3 days.  May shower after removal of dressing, do not soak in a tub.  Place Band-Aid over incision after removal of dressing.     Shower on day dressing removed (No bath)   Order Comments: May remove dressing in 3 days.  May shower after removal of dressing, do not soak in tub.  Place Band-Aid over incision after removal of dressing        TIME SPENT ON DISCHARGE: 10 minutes

## 2022-06-13 NOTE — OP NOTE
[facility]  Orthopedic Surgery    6/13/2022    Nely Salas  [id number]      PREOPERATIVE DIAGNOSIS: Trigger thumb, left thumb [M65.312]    POSTOPERATIVE DIAGNOSIS:  Trigger thumb, left thumb.    PROCEDURE:  A1 pulley release, left thumb.    SURGEON: Wiliam Katz D.O.    ASSISTANT: Orton Grinnell, CFA    ANESTHESIA:  General.    BLOOD LOSS:  Less than 5 cc.     TOURNIQUET:  10 minutes.    DRAINS:  None    PATHOLOGY:  None.    COMPLICATION:  None.    INDICATIONS FOR PROCEDURE:  Ms. Salas is a 52-year-old right-hand-dominant lady who has left thumb pain and catching.  She awoke 1 morning and had the pain and catching she does not remember in a traumatic event.  She stated, it gets stuck and I have to pop it out.  It hurts real bad when I pop it .  Flexing her thumb increases her symptoms while keeping her thumb straight decreases them.  She has taken NSAIDs without help.  She has worn a splint which did not help. She elected to proceed with surgery after she failed conservative management complications to include bleeding, infection, scarring, nerve/blood vessel/tendon damage, need for further surgery, failed surgery, failure to improve, stiffness, skin slough, and possible recurrence were discussed.  She signed a consent.    PROCEDURE IN DETAIL: The patient was brought to the operating room and was transferred to the operating bed where all bony prominences were well padded.  General anesthesia was then administered by the Anesthesiology Department.  After general anesthesia was administered a tourniquet was applied to the upper part of the patient's left upper extremity.  The patient's left arm was then prepped with chlorhexidine solution and draped in the normal sterile fashion.  After prepping and draping bony and soft tissue landmarks were palpated and a transverse incision was drawn at the A1 pulley of the left thumb.  The patient's arm was then elevated, exsanguinated, tourniquet was  inflated.        Sharp incision was then made with a #15 blade followed by dissection to the level of the A1 pulley while protecting vital structures.  The pulley was identified and sharp incision was made in the pulley with a #15 blade exposing the tendons.  The incision in the pulley was then advanced proximally and distally with tenotomy scissors.  A cuff of the pulley was then removed with a #15 blade.  The flexor tendons were then brought into the operative field there was fraying of the tendons.  The incision was then copiously irrigated.  The patient's thumb was then put through a full motion no triggering or catching was noted.       The incision was then closed with nylon suture in a horizontal mattress type of fashion.  It was then dressed with Adaptic, sterile gauze, sterile fluffs and Ace wrap.  She was then awakened by anesthesia and transferred from the operating room to the recovery room in stable condition.  She tolerated the procedure well without complication.

## 2022-06-13 NOTE — ANESTHESIA POSTPROCEDURE EVALUATION
Anesthesia Post Evaluation    Patient: Nely Salas    Procedure(s) Performed: Procedure(s) (LRB):  TRIGGER FINGER RELEASE LEFT THUMB (Left)    Final Anesthesia Type: general      Patient location during evaluation: PACU  Patient participation: Yes- Able to Participate  Level of consciousness: awake and alert  Post-procedure vital signs: reviewed and stable  Pain management: adequate  Airway patency: patent    PONV status at discharge: No PONV  Anesthetic complications: no      Cardiovascular status: blood pressure returned to baseline  Respiratory status: unassisted  Hydration status: euvolemic  Follow-up not needed.          Vitals Value Taken Time   /77 06/13/22 1205   Temp 36.8 °C (98.3 °F) 06/13/22 1129   Pulse 55 06/13/22 1207   Resp 25 06/13/22 1207   SpO2 98 % 06/13/22 1205   Vitals shown include unvalidated device data.      Event Time   Out of Recovery 11:35:00         Pain/Mayra Score: Mayra Score: 10 (6/13/2022 12:05 PM)

## 2022-06-13 NOTE — PLAN OF CARE
Pt drowsy, but arousable. Pt tolerating sprite VSS. Awake alert oriented. Pt family notified pt in recovery. Will continue to monitor.

## 2022-06-13 NOTE — TRANSFER OF CARE
"Anesthesia Transfer of Care Note    Patient: Nely Salas    Procedure(s) Performed: Procedure(s) (LRB):  TRIGGER FINGER RELEASE LEFT THUMB (Left)    Patient location: PACU    Anesthesia Type: general    Transport from OR: Transported from OR on room air with adequate spontaneous ventilation    Post pain: adequate analgesia    Post assessment: no apparent anesthetic complications    Post vital signs: stable    Level of consciousness: sedated and responds to stimulation    Nausea/Vomiting: no nausea/vomiting    Complications: none    Transfer of care protocol was followed      Last vitals:   Visit Vitals  BP (!) 163/95   Pulse 65   Temp 36.9 °C (98.4 °F)   Resp 15   Ht 5' 5" (1.651 m)   Wt (!) 141.1 kg (311 lb)   SpO2 100%   Breastfeeding No   BMI 51.75 kg/m²     "

## 2022-06-13 NOTE — PLAN OF CARE
Pt tolerated procedure. Pt spoke to Dr. Katz and verbalizes understanding of discharge instructions. Pt operative arm elevated and iced. Prescriptions given to . Will continue to monitor.

## 2022-06-13 NOTE — PROGRESS NOTES
Subjective:       Patient ID: Nely Salas is a 52 y.o. female.    Chief Complaint: Follow-up and Diabetes Mellitus  Patient presents with her  for follow-up due to type 2 diabetes, athlete's foot with peeling skin, neuropathic pain.  She has switched from Lyrica to 800 mg gabapentin 3 times daily.  Relates pain level is still 10/10.  She states diabetes has been fairly well controlled with glucose 140 this morning.  Has had continued problems with dry skin on her feet, not using  Clobetasol.   Reports A1c was 7.8      Past Medical History:   Diagnosis Date    Anemia, unspecified     Diabetes mellitus, type 2     Diabetic neuropathy     GERD (gastroesophageal reflux disease)     Hypertension     Sleep apnea      Past Surgical History:   Procedure Laterality Date     SECTION      CHOLECYSTECTOMY      FALLOPIAN TUBOPLASTY       History reviewed. No pertinent family history.  Social History     Socioeconomic History    Marital status:    Tobacco Use    Smoking status: Never Smoker    Smokeless tobacco: Never Used   Substance and Sexual Activity    Alcohol use: Yes     Comment: social    Drug use: Never    Sexual activity: Yes     Partners: Male       No current facility-administered medications for this visit.     No current outpatient medications on file.     Facility-Administered Medications Ordered in Other Visits   Medication Dose Route Frequency Provider Last Rate Last Admin    cefazolin (ANCEF) 2 gram in dextrose 5% 50 mL IVPB (premix)  2 g Intravenous Once Wiliam Katz DO        lactated ringers infusion   Intravenous Continuous Jero Mitchell MD 10 mL/hr at 22 0847 New Bag at 22 0847    LIDOcaine (PF) 10 mg/ml (1%) injection 10 mg  1 mL Intradermal Once Jero Mitchell MD         Review of patient's allergies indicates:   Allergen Reactions    Bactrim [sulfamethoxazole-trimethoprim] Nausea And Vomiting    Zithromax [azithromycin] Nausea And Vomiting  "   Morphine Hives    Sulfamethoxazole Other (See Comments)    Trimethoprim Other (See Comments)    Amoxicillin trihydrate Nausea Only and Other (See Comments)    Sulfa (sulfonamide antibiotics) Nausea And Vomiting       Review of Systems   HENT: Negative for congestion.    Respiratory: Negative for cough and shortness of breath.    Cardiovascular: Positive for leg swelling.   All other systems reviewed and are negative.      Objective:      Vitals:    06/07/22 1533   BP: (!) 151/85   Pulse: 67   Resp: 16   Weight: (!) 141.1 kg (311 lb)   Height: 5' 5" (1.651 m)     Physical Exam  Vitals and nursing note reviewed. Exam conducted with a chaperone present.   Constitutional:       General: She is not in acute distress.  Cardiovascular:      Pulses:           Dorsalis pedis pulses are 2+ on the right side and 2+ on the left side.        Posterior tibial pulses are 2+ on the right side and 2+ on the left side.   Pulmonary:      Effort: Pulmonary effort is normal.   Musculoskeletal:      Right foot: No deformity.      Left foot: No deformity.   Feet:      Right foot:      Skin integrity: Dry skin present.      Toenail Condition: Right toenails are abnormally thick. Fungal disease present.     Left foot:      Skin integrity: Dry skin (Dry peeling skin feet, improved, no evidence tinea pedis) present.      Toenail Condition: Left toenails are abnormally thick. Fungal disease present.  Skin:     General: Skin is dry.      Capillary Refill: Capillary refill takes less than 2 seconds.      Comments: Dry, peeling skin plantar feet secondary to chronic tinea pedis which is improved   Neurological:      General: No focal deficit present.      Comments: Painful paresthesias feet, diabetic neuropathy   Psychiatric:         Mood and Affect: Mood normal.         Behavior: Behavior normal.                                                          Assessment:       1. Tinea pedis of both feet    2. DM type 2, uncontrolled, with " neuropathy    3. Neuropathic pain of both feet    4. Onychomycosis of toenail        Plan:         CLOTRIMAZOLE/BETAMETHASONE CR TWICE DAILY X 2 WEEKS      Reviewed dry chronic athlete's foot with patient, skin cracks and peeling puts her at high risk for potential complications.  Instructed patient to apply clotrimazole/betamethasone cream twice daily x2 weeks.  She can use over-the-counter antifungal cream or lotion explained again it needs to be applied every day for a 2 week period of time, then areas need to be spot treated to prevent recurrence advised this condition works best when treating the toenails as well and we discussed topical treatments for both conditions, soaking would be more effective for both skin and nails and reviewed soaking regimens  Reviewed diabetic education   Reviewed neuropathy pain in feet  Reviewed appropriate shoes  Reviewed maintenance of nails.  Thickness of nails reduced, dry skin and peeling smooth on feet.  Encourage patient ago for pedicures once monthly  Reviewed need for daily foot checks and instructed patient to contact the office with any area of redness or swelling which has not improved within 3 days.  Patient/family were in understanding and agreement with treatment plan.  I counseled the patient on their conditions, implications and medical management.  Instructed patient/family to contact the office with any changes, questions, concerns, worsening of symptoms.   Total face to face time, exam, assessment, treatment, discussion, documentation 20 minutes, more than half this time spent on consultation and coordination of care.   Follow up as needed    This note was created using [employer] voice recognition software that occasionally misinterpreted phrases or words.

## 2022-06-14 VITALS
RESPIRATION RATE: 25 BRPM | TEMPERATURE: 98 F | WEIGHT: 293 LBS | HEIGHT: 65 IN | DIASTOLIC BLOOD PRESSURE: 77 MMHG | HEART RATE: 66 BPM | OXYGEN SATURATION: 98 % | SYSTOLIC BLOOD PRESSURE: 141 MMHG | BODY MASS INDEX: 48.82 KG/M2

## 2022-06-20 DIAGNOSIS — M25.512 BILATERAL SHOULDER PAIN, UNSPECIFIED CHRONICITY: Primary | ICD-10-CM

## 2022-06-20 DIAGNOSIS — M25.511 BILATERAL SHOULDER PAIN, UNSPECIFIED CHRONICITY: Primary | ICD-10-CM

## 2022-06-22 ENCOUNTER — OFFICE VISIT (OUTPATIENT)
Dept: ORTHOPEDICS | Facility: CLINIC | Age: 52
End: 2022-06-22
Payer: MEDICAID

## 2022-06-22 VITALS — WEIGHT: 293 LBS | RESPIRATION RATE: 19 BRPM | HEIGHT: 65 IN | BODY MASS INDEX: 48.82 KG/M2

## 2022-06-22 DIAGNOSIS — Z48.02 ENCOUNTER FOR REMOVAL OF SUTURES: Primary | ICD-10-CM

## 2022-06-22 DIAGNOSIS — Z98.890 S/P TRIGGER FINGER RELEASE: Primary | ICD-10-CM

## 2022-06-22 PROCEDURE — 99024 SUTURE REMOVAL: ICD-10-PCS | Mod: ,,, | Performed by: ORTHOPAEDIC SURGERY

## 2022-06-22 PROCEDURE — 4010F PR ACE/ARB THEARPY RXD/TAKEN: ICD-10-PCS | Mod: CPTII,,, | Performed by: ORTHOPAEDIC SURGERY

## 2022-06-22 PROCEDURE — 99999 PR PBB SHADOW E&M-EST. PATIENT-LVL III: CPT | Mod: PBBFAC,,, | Performed by: ORTHOPAEDIC SURGERY

## 2022-06-22 PROCEDURE — 3008F BODY MASS INDEX DOCD: CPT | Mod: CPTII,,, | Performed by: ORTHOPAEDIC SURGERY

## 2022-06-22 PROCEDURE — 99024 POSTOP FOLLOW-UP VISIT: CPT | Mod: ,,, | Performed by: ORTHOPAEDIC SURGERY

## 2022-06-22 PROCEDURE — 99213 OFFICE O/P EST LOW 20 MIN: CPT | Mod: PBBFAC | Performed by: ORTHOPAEDIC SURGERY

## 2022-06-22 PROCEDURE — 99999 PR PBB SHADOW E&M-EST. PATIENT-LVL III: ICD-10-PCS | Mod: PBBFAC,,, | Performed by: ORTHOPAEDIC SURGERY

## 2022-06-22 PROCEDURE — 3008F PR BODY MASS INDEX (BMI) DOCUMENTED: ICD-10-PCS | Mod: CPTII,,, | Performed by: ORTHOPAEDIC SURGERY

## 2022-06-22 PROCEDURE — 1159F PR MEDICATION LIST DOCUMENTED IN MEDICAL RECORD: ICD-10-PCS | Mod: CPTII,,, | Performed by: ORTHOPAEDIC SURGERY

## 2022-06-22 PROCEDURE — 1159F MED LIST DOCD IN RCRD: CPT | Mod: CPTII,,, | Performed by: ORTHOPAEDIC SURGERY

## 2022-06-22 PROCEDURE — 4010F ACE/ARB THERAPY RXD/TAKEN: CPT | Mod: CPTII,,, | Performed by: ORTHOPAEDIC SURGERY

## 2022-06-22 NOTE — PROGRESS NOTES
Subjective:      Patient ID: Nely Salas is a 52 y.o. female.    Chief Complaint: Post-op Evaluation of the Left Hand      HPI:  Ms. De Leon returns today for 1st postop visit on a trigger thumb release of her left hand.  Her date of surgery 06/13/2022.  She stated she is doing well but does have some pain at the base of her thumb.    ROS:  New diagnosis/surgery/prescriptions since last office visit on 06/01/2022:  A1 pulley release left thumb  Constitution: Negative for chills and fever.   HENT: Negative for congestion.    Eyes: Negative for blurred vision.   Cardiovascular: Negative for chest pain.   Respiratory: Negative for cough.    Endocrine: Negative for polydipsia.   Hematologic/Lymphatic: Negative for adenopathy.   Skin: Negative for flushing and itching.   Musculoskeletal: Positive for back pain and joint pain. Negative for gout.   Gastrointestinal: Positive for heartburn. Negative for constipation and diarrhea.   Genitourinary: Negative for nocturia.   Neurological: Positive for headaches. Negative for seizures.   Psychiatric/Behavioral: Negative for depression and substance abuse. The patient is nervous/anxious.    Allergic/Immunologic: Positive for environmental allergies.       Objective:      Physical Exam:   General: AAOx3.  No acute distress  Vascular:  Pulses intact and equal bilaterally.  Capillary refill less than 3 seconds and equal bilaterally  Neurologic:  Pinprick and soft touch intact and equal bilaterally  Integment:  Incision well approximated with sutures in place  Extremity:  Hand:  Pronation/supination equal bilaterally.  Dorsiflexion/volar flexion equal bilaterally.  Patient reluctant to move her thumb due to some tenderness.  When she does move her thumb no triggering is noted.  Mild tenderness with motion.  Mild tenderness with palpation.  Relatively no swelling.  Radiography:  No x-rays done today.      Assessment:       Impression:     1. S/P trigger finger release, left thumb           Plan:       1.  Discussed physical examination with the patient. Nely understands that she had an A1 pulley release of her left thumb.  2. Remove sutures and place Steri-Strips.  3. Refer to occupational therapy for to start the patient on a post release A1 pulley, trigger thumb protocol.  4. Any pain can be treated with over-the-counter medications dosed per box instructions.  5. Follow-up in 1 month.

## 2022-06-28 ENCOUNTER — CLINICAL SUPPORT (OUTPATIENT)
Dept: REHABILITATION | Facility: HOSPITAL | Age: 52
End: 2022-06-28
Attending: ORTHOPAEDIC SURGERY
Payer: MEDICAID

## 2022-06-28 ENCOUNTER — OFFICE VISIT (OUTPATIENT)
Dept: ORTHOPEDICS | Facility: CLINIC | Age: 52
End: 2022-06-28
Payer: MEDICAID

## 2022-06-28 ENCOUNTER — HOSPITAL ENCOUNTER (OUTPATIENT)
Dept: RADIOLOGY | Facility: HOSPITAL | Age: 52
Discharge: HOME | End: 2022-06-28
Attending: ORTHOPAEDIC SURGERY
Payer: MEDICAID

## 2022-06-28 VITALS — WEIGHT: 293 LBS | BODY MASS INDEX: 48.82 KG/M2 | HEIGHT: 65 IN | RESPIRATION RATE: 16 BRPM

## 2022-06-28 DIAGNOSIS — M75.41 IMPINGEMENT SYNDROME OF BOTH SHOULDERS: ICD-10-CM

## 2022-06-28 DIAGNOSIS — S46.012A TRAUMATIC INCOMPLETE TEAR OF LEFT ROTATOR CUFF, INITIAL ENCOUNTER: Primary | ICD-10-CM

## 2022-06-28 DIAGNOSIS — M25.511 BILATERAL SHOULDER PAIN, UNSPECIFIED CHRONICITY: ICD-10-CM

## 2022-06-28 DIAGNOSIS — M25.511 CHRONIC PAIN OF BOTH SHOULDERS: ICD-10-CM

## 2022-06-28 DIAGNOSIS — Z98.890 S/P TRIGGER FINGER RELEASE: ICD-10-CM

## 2022-06-28 DIAGNOSIS — M25.512 CHRONIC PAIN OF BOTH SHOULDERS: ICD-10-CM

## 2022-06-28 DIAGNOSIS — M19.011 ARTHRITIS OF BOTH ACROMIOCLAVICULAR JOINTS: ICD-10-CM

## 2022-06-28 DIAGNOSIS — M79.2 RADICULAR PAIN OF LEFT UPPER EXTREMITY: ICD-10-CM

## 2022-06-28 DIAGNOSIS — G89.29 CHRONIC PAIN OF BOTH SHOULDERS: ICD-10-CM

## 2022-06-28 DIAGNOSIS — M19.012 ARTHRITIS OF BOTH ACROMIOCLAVICULAR JOINTS: ICD-10-CM

## 2022-06-28 DIAGNOSIS — M75.42 IMPINGEMENT SYNDROME OF BOTH SHOULDERS: ICD-10-CM

## 2022-06-28 DIAGNOSIS — M65.312 TRIGGER FINGER OF LEFT THUMB: Primary | ICD-10-CM

## 2022-06-28 DIAGNOSIS — M25.512 BILATERAL SHOULDER PAIN, UNSPECIFIED CHRONICITY: ICD-10-CM

## 2022-06-28 PROCEDURE — 20610 LARGE JOINT ASPIRATION/INJECTION: L GLENOHUMERAL: ICD-10-PCS | Mod: S$PBB,79,LT, | Performed by: ORTHOPAEDIC SURGERY

## 2022-06-28 PROCEDURE — 99999 PR PBB SHADOW E&M-EST. PATIENT-LVL III: ICD-10-PCS | Mod: PBBFAC,,, | Performed by: ORTHOPAEDIC SURGERY

## 2022-06-28 PROCEDURE — 4010F ACE/ARB THERAPY RXD/TAKEN: CPT | Mod: CPTII,,, | Performed by: ORTHOPAEDIC SURGERY

## 2022-06-28 PROCEDURE — 73030 XR SHOULDER COMPLETE 2 OR MORE VIEWS BILATERAL: ICD-10-PCS | Mod: 26,50,, | Performed by: RADIOLOGY

## 2022-06-28 PROCEDURE — 3008F BODY MASS INDEX DOCD: CPT | Mod: CPTII,,, | Performed by: ORTHOPAEDIC SURGERY

## 2022-06-28 PROCEDURE — 99213 OFFICE O/P EST LOW 20 MIN: CPT | Mod: PBBFAC,PN | Performed by: ORTHOPAEDIC SURGERY

## 2022-06-28 PROCEDURE — 1159F MED LIST DOCD IN RCRD: CPT | Mod: CPTII,,, | Performed by: ORTHOPAEDIC SURGERY

## 2022-06-28 PROCEDURE — 1159F PR MEDICATION LIST DOCUMENTED IN MEDICAL RECORD: ICD-10-PCS | Mod: CPTII,,, | Performed by: ORTHOPAEDIC SURGERY

## 2022-06-28 PROCEDURE — 73030 X-RAY EXAM OF SHOULDER: CPT | Mod: 26,50,, | Performed by: RADIOLOGY

## 2022-06-28 PROCEDURE — 99999 PR PBB SHADOW E&M-EST. PATIENT-LVL III: CPT | Mod: PBBFAC,,, | Performed by: ORTHOPAEDIC SURGERY

## 2022-06-28 PROCEDURE — 97166 OT EVAL MOD COMPLEX 45 MIN: CPT | Mod: PN

## 2022-06-28 PROCEDURE — 73030 X-RAY EXAM OF SHOULDER: CPT | Mod: TC,50,PN

## 2022-06-28 PROCEDURE — 99024 POSTOP FOLLOW-UP VISIT: CPT | Mod: ,,, | Performed by: ORTHOPAEDIC SURGERY

## 2022-06-28 PROCEDURE — 20610 DRAIN/INJ JOINT/BURSA W/O US: CPT | Mod: PBBFAC,PN | Performed by: ORTHOPAEDIC SURGERY

## 2022-06-28 PROCEDURE — 3008F PR BODY MASS INDEX (BMI) DOCUMENTED: ICD-10-PCS | Mod: CPTII,,, | Performed by: ORTHOPAEDIC SURGERY

## 2022-06-28 PROCEDURE — 99024 PR POST-OP FOLLOW-UP VISIT: ICD-10-PCS | Mod: ,,, | Performed by: ORTHOPAEDIC SURGERY

## 2022-06-28 PROCEDURE — 4010F PR ACE/ARB THEARPY RXD/TAKEN: ICD-10-PCS | Mod: CPTII,,, | Performed by: ORTHOPAEDIC SURGERY

## 2022-06-28 PROCEDURE — 99214 OFFICE O/P EST MOD 30 MIN: CPT | Mod: 25,S$PBB,, | Performed by: ORTHOPAEDIC SURGERY

## 2022-06-28 PROCEDURE — 99214 PR OFFICE/OUTPT VISIT, EST, LEVL IV, 30-39 MIN: ICD-10-PCS | Mod: 25,S$PBB,, | Performed by: ORTHOPAEDIC SURGERY

## 2022-06-28 RX ORDER — TRIAMCINOLONE ACETONIDE 40 MG/ML
40 INJECTION, SUSPENSION INTRA-ARTICULAR; INTRAMUSCULAR
Status: DISCONTINUED | OUTPATIENT
Start: 2022-06-28 | End: 2022-06-28 | Stop reason: HOSPADM

## 2022-06-28 RX ADMIN — TRIAMCINOLONE ACETONIDE 40 MG: 40 INJECTION, SUSPENSION INTRA-ARTICULAR; INTRAMUSCULAR at 03:06

## 2022-06-28 NOTE — PROGRESS NOTES
[facility]  Occupational Therapy Initial Evaluation    Date: 6/28/2022  Name: Nely Salas  Clinic Number: [id number]    Therapy Diagnosis:   Encounter Diagnoses   Name Primary?    S/P trigger finger release     Trigger finger of left thumb Yes     Physician: Wiliam Katz DO    Physician Orders: OT evaluate and treat  Medical Diagnosis: s/p trigger finger release, L thumb  Surgical Procedure and Date: 6/13/2022  Evaluation Date: 6/28/2022  Insurance Authorization Period Expiration: 06/22/2023  Plan of Care Certification Period: 10/10/2022  Progress Note Due: 07/28/2022  Date of Return to MD: July 2022  Visit # / Visits authorized: 1/12  FOTO: 1/3    Precautions:  Standard    Time In:1: 30  Time Out: 2: 15  Total Appointment Time (timed & untimed codes): 45 minutes    SUBJECTIVE     Date of Onset: 2 months prior to surgery    History of Current Condition/Mechanism of Injury: Nely reports: Ms. Salas is a 52-year-old right-hand-dominant lady who has left thumb pain and catching.  She awoke 1 morning and had the pain and catching she does not remember in a traumatic event.  She stated, it gets stuck and I have to pop it out.  It hurts real bad when I pop it .  Flexing her thumb increases her symptoms while keeping her thumb straight decreases them.  She has taken NSAIDs without help.  She has worn a splint which did not help. She elected to proceed with surgery after she failed conservative management    Falls: no hx    Involved Side: L side  Dominant Side: Right  Imaging: x ray- see epic  Prior Therapy: yes, PT for her neck; none for current condition   Occupation:  disabled  Working presently: disability      Functional Limitations/Social History:    Previous functional status includes: Independent with all ADLs.     Current Functional Status   Home/Living environment: sons and ; one story home      Limitation of Functional Status as follows:   ADLs/IADLs:     -  "Feeding: pt is independent     - Bathing: pt is independent     - Dressing/Grooming: pt is independent     - Driving: pt is independent        Pain:  Functional Pain Scale Rating 0-10: Current 6/10, worst 10/10  Location: L thumb/wrist  Description: sharp/shooting  Aggravating Factors: certain movements   Easing Factors: Meds, ice    Patient's Goals for Therapy: pt would like to get her L hand "back up to par"    Medical History:   Past Medical History:   Diagnosis Date    Anemia, unspecified     Diabetes mellitus, type 2     Diabetic neuropathy     GERD (gastroesophageal reflux disease)     Hypertension     Sleep apnea        Surgical History:    has a past surgical history that includes Cholecystectomy; Fallopian tuboplasty;  section; and Trigger finger release (Left, 2022).    Medications:   has a current medication list which includes the following prescription(s): cetirizine, diclofenac sodium, easy touch twist lancets, jardiance, esomeprazole, gabapentin, lisinopril, naloxone, oxycodone, trazodone, and zolpidem, and the following Facility-Administered Medications: iron dextran (INFED) 200 mg in sodium chloride 0.9% 250 mL IVPB.    Allergies:   Review of patient's allergies indicates:   Allergen Reactions    Bactrim [sulfamethoxazole-trimethoprim] Nausea And Vomiting    Zithromax [azithromycin] Nausea And Vomiting    Morphine Hives    Sulfamethoxazole Other (See Comments)    Trimethoprim Other (See Comments)    Amoxicillin trihydrate Nausea Only and Other (See Comments)    Sulfa (sulfonamide antibiotics) Nausea And Vomiting          OBJECTIVE   Edema:  R thumb base: 7 cm  L thumb base: 9 cm    MMT  L wrist: 5/5  L thumb flexion: 3/5  L thumb extension: 2/5      *Sensation intact with some numbness present in the L thumb tip (pt reports occasional numbness in the remaining digits/tips of the L hand)  Limitation/Restriction for FOTO hand Survey    Therapist reviewed FOTO scores for " Nely Salas on 6/28/2022.   FOTO documents entered into [employer] - see Media section.    Limitation Score: 65%         Treatment   Total Treatment time (time-based codes) separate from Evaluation: 0 minutes    Patient Education and Home Exercises      Education provided:   - yes pt was educated on the importance of massaging her scar in order to break up scar tissue, pt was educated on minimizing gripping and pinching tasks in order to allow the tendon to heal    Written Home Exercises Provided: yes. Tendon glides were given to pt, OT demonstrated along with pt   Exercises were reviewed and Nely was able to demonstrate them prior to the end of the session.  Nely demonstrated good  understanding of the education provided. See EMR under Patient Instructions for exercises provided during therapy sessions.     Pt was advised to perform these exercises free of pain, and to stop performing them if pain occurs.    Patient/Family Education: role of OT, goals for OT, scheduling/cancellations - pt verbalized understanding. Discussed insurance limitations with patient.      ASSESSMENT     Nely Salas is a 52 y.o. female referred to outpatient occupational therapy and presents with a medical diagnosis of L thumb trigger figner.  Patient presents with the following therapy deficits: Decreased ROM, Decreased  strength, Decreased pinch strength, Decreased functional hand use, Increased pain, Edema, Scar Adhesions and Diminished/Impaired Sensation and demonstrates limitations as described in the chart below. Following medical record review it is determined that pt will benefit from occupational therapy services in order to maximize pain free and/or functional use of left thumb. The following goals were discussed with the patient and patient is in agreement with them as to be addressed in the treatment plan. The patient's rehab potential is Good.     Anticipated barriers to occupational therapy: none  Pt has no cultural,  educational or language barriers to learning provided.    Profile and History Assessment of Occupational Performance Level of Clinical Decision Making Complexity Score   Occupational Profile:   Nely Salas is a 52 y.o. female who lives with their family and is on disability Nely Salas has difficulty with  ADLs and IADLs as listed previously, which  Affecting herdaily functional abilities.      Comorbidities:    has a past medical history of Anemia, unspecified, Diabetes mellitus, type 2, Diabetic neuropathy, GERD (gastroesophageal reflux disease), Hypertension, and Sleep apnea.    Medical and Therapy History Review:   Med hx reviewed                Performance Deficits    Physical:  Joint Mobility  Joint Stability  Muscle Power/Strength  Skin Integrity/Scar Formation  Edema   Strength  Pinch Strength  Fine Motor Coordination  Pain    Cognitive:  No Deficits    Psychosocial:    No Deficits     Clinical Decision Making:  moderate    Assessment Process:  Problem-Focused Assessments    Modification/Need for Assistance:  Not Necessary    Intervention Selection:  Several Treatment Options       moderate  Based on PMHX, co morbidities , data from assessments and functional level of assistance required with task and clinical presentation directly impacting function.       The following goals were discussed with the patient and patient is in agreement with them as to be addressed in the treatment plan.     Goals:   Short term  1. Pt will improve MMT of the L thumb extension to 3/5 or greater by 4 weeks   2. Pt will improve FOTO limitation score to 55% or less by 4 weeks   3. Pt edema will decrease by .5 cm or greater by 4 weeks.   4. Pt will initiate HEP by 2 weeks.    Long term  1. Pt will improve MMT of the L thumb flexion/extension to 4/5 or greater prior to discharge  2. Pt will improve FOTO limitation score to 45% or less prior to discharge.   3. Pt edema will decrease by 1 cm or greater prior to discharge.    4. Pt will report 100% compliance/independent  with HEP prior to discharge.       PLAN   Plan of Care Certification: 6/28/2022 to 10/10/2022.     Outpatient Occupational Therapy 1 times weekly for 12 weeks to include the following interventions: Paraffin, Manual therapy/joint mobilizations, Modalities for pain management, US 3 mhz, Therapeutic exercises/activities., Strengthening, Edema Control, Scar Management, Electrical Modalities and Joint Protection.      Marybeth Novak OT      I CERTIFY THE NEED FOR THESE SERVICES FURNISHED UNDER THIS PLAN OF TREATMENT AND WHILE UNDER MY CARE  Physician's comments:      Physician's Signature: ___________________________________________________

## 2022-06-28 NOTE — PROCEDURES
Large Joint Aspiration/Injection: L glenohumeral    Date/Time: 6/28/2022 3:00 PM  Performed by: Wiliam Katz DO  Authorized by: Wiliam Katz, DO     Consent Done?:  Yes (Verbal)  Indications:  Diagnostic evaluation and pain  Site marked: the procedure site was marked    Timeout: prior to procedure the correct patient, procedure, and site was verified    Prep: patient was prepped and draped in usual sterile fashion      Details:  Needle Size:  22 G  Ultrasonic Guidance for needle placement?: No    Approach:  Posterior  Location:  Shoulder  Site:  L glenohumeral  Medications:  40 mg triamcinolone acetonide 40 mg/mL; 40 mg triamcinolone acetonide 40 mg/mL  Patient tolerance:  Patient tolerated the procedure well with no immediate complications

## 2022-06-28 NOTE — PROGRESS NOTES
"Subjective:      Patient ID: Nely Salas is a 52 y.o. female.    Chief Complaint: Pain of the Left Shoulder and Pain of the Right Shoulder      HPI:  Ms. Salas returned today with new complaints of approximately 1 year of bilateral shoulder pain increasing intensity over the last 3 months after she was involved in a [employer]" last year.  Now circumduction forward flexion in abduction increases her symptoms along with backward flexion of her neck.  She has used been gait with some help.  Her symptoms awaken her at night.  She has taken NSAIDs with help.  She has not worn a brace.  She had physical therapy on her neck which gave her some relief but not O physical therapy on her shoulder.  She has not had injections.  Intermittently the pain will radiate from her neck to her fingers.    ROS:  No new diagnosis/surgery/prescriptions since last office visit on 06/22/2022.  Constitution: Negative for chills and fever.   HENT: Negative for congestion.    Eyes: Negative for blurred vision.   Cardiovascular: Negative for chest pain.   Respiratory: Negative for cough.    Endocrine: Negative for polydipsia.   Hematologic/Lymphatic: Negative for adenopathy.   Skin: Negative for flushing and itching.   Musculoskeletal: Positive for back pain and joint pain. Negative for gout.   Gastrointestinal: Positive for heartburn. Negative for constipation and diarrhea.   Genitourinary: Negative for nocturia.   Neurological: Positive for headaches. Negative for seizures.   Psychiatric/Behavioral: Negative for depression and substance abuse. The patient is nervous/anxious.    Allergic/Immunologic: Positive for environmental allergies.       Objective:      Physical Exam:   General: AAOx3.  No acute distress  Vascular:  Pulses intact and equal bilaterally.  Capillary refill less than 3 seconds and equal bilaterally  Neurologic:  Pinprick and soft touch intact and equal bilaterally.  Spurling's positive  Integment:  No " ecchymosis, no errythema  Extremity:  Shoulder:  Forward flexion/abduction equal bilaterally 0/170 degrees.  Internal rotation equal bilaterally L1.  Negative drop-arm both shoulders.  Negative lift-off both shoulders.  Full can negative both shoulders.  Empty can negative both shoulders.  Benjamin/Neer positive primarily left shoulder with some mild right shoulder.  Cross-arm negative both shoulders.  Nontender over the AC joint bilaterally.  Nontender in the bicipital groove bilaterally.  Yergason's negative bilaterally.  Apprehension/relocation negative bilaterally.                      C-spine:  Forward flexion/backward flexion 70/65 degrees, increased symptoms with backward flexion.  Right/left rotation 70/70 degrees, increased symptoms with left rotation.  Right/left side bending 65/65 degrees, increased symptoms with left side bending.  Spurling's positive.  Tender with palpation C-spine  Radiography:  Personally reviewed x-rays of both shoulders completed on 06/28/2022 showed AC arthritis and sclerosis of the greater tuberosity consistent with impingement      Assessment:       Impression:     1. Traumatic incomplete tear of left rotator cuff, initial encounter    2. Impingement syndrome of both shoulders    3. Arthritis of both acromioclavicular joints    4. Chronic pain of both shoulders    5. Radicular pain of left upper extremity          Plan:       1.  Discussed physical examination and radiographic findings with the patient. Nely understands that she has what appears to be a partial-thickness rotator cuff tear of both shoulders left greater than right secondary to impingement.  She also has AC arthritis.  Also discussed with the patient she appears to have a component of radicular shoulder pain.  Treatment alternatives and outcomes were discussed with the patient she understands she could be treated conservatively with observation, activity modification, NSAIDs, bracing, physical therapy,  injections, or she could consider surgical intervention such as arthroscopy with rotator cuff repair.  Discussed in detail with the patient that the radicular component of her shoulder pain is being caused by her neck and this office does not treat spine issues so she will need to be referred to a spine surgeon.  2. Offered a steroid injection to the left shoulder, she elected to proceed.  3. Home exercises were discussed with the patient a pamphlet on home exercises was given to her.  4. Continue with NSAIDs as tolerated and allowed by PCM.  5. Refer to Spine surgery.  6. Take NSAIDs as tolerated allowed by PCM.  7. Follow up p.r.n..  She has not improved by next visit may discuss possible arthroscopy in refer her for an MRI of her shoulder at that time.

## 2022-07-07 ENCOUNTER — CLINICAL SUPPORT (OUTPATIENT)
Dept: REHABILITATION | Facility: HOSPITAL | Age: 52
End: 2022-07-07
Attending: ORTHOPAEDIC SURGERY
Payer: MEDICAID

## 2022-07-07 DIAGNOSIS — M65.312 TRIGGER FINGER OF LEFT THUMB: Primary | ICD-10-CM

## 2022-07-07 PROCEDURE — 97110 THERAPEUTIC EXERCISES: CPT | Mod: PN

## 2022-07-07 PROCEDURE — 97018 PARAFFIN BATH THERAPY: CPT | Mod: PN

## 2022-07-07 PROCEDURE — 97140 MANUAL THERAPY 1/> REGIONS: CPT | Mod: PN

## 2022-07-07 NOTE — PROGRESS NOTES
Occupational Therapy Daily Treatment Note   Name: Nely Salas 1970  MRN: [id number]    Visit Date: 7/7/2022  Visit #: 2 /12  Authorization period Expiration: 06/29/2023  Plan of Care Expiration: 10/10/2022  Precautions: standard    Time In: 9: 30  Time Out: 10: 15  Total 1:1 Treatment Time: 45 min    Treatment Diagnosis: No diagnosis found.  Physician: Wiliam Katz DO    Subjective   Pt reports: Her L wrist Is hurting today  She was compliant with home exercise program.     Pain Scale:  4/10 on VAS currently  Pain Location: L hand/wrist    Objective   Nely received therapeutic exercises to develop strength, endurance, ROM and flexibility for 20 minutes including:  Ip joint blocking  Thumb flexion into palm   Three point pinch using green resistance  Abduction   L wrist flexion/extension using 2 # wt    Nely received the following manual therapy techniques: Soft tissue Mobilization were applied to the: L wrist and hand for 15 minutes along with PROM in order to alleviate pain and improve stretch the muscles/tendons to prevent tightness /improve ROM    Nely received the following direct contact modalities after being cleared for contraindications: Paraffin bath was applied to the L hand for 10 mins in order to alleviate pain and relax the tendons/muscles within the hand/wrist.       Home Exercises and Education Provided     Education provided re:   - progress towards goals   - role of therapy in multi - disciplinary team, goals for therapy  Pt educated on condition, POC, and expectations in therapy.  No spiritual or educational barriers to learning provided    Home exercises:  Pt will be provided HEP during course of treatment with progressions as appropriate. Pt was advised to perform these exercises free of pain, and to stop performing them if pain occurs.   Nely demonstrated good  understanding of the education provided.     Assessment   Nely is progressing well  towards her goals and no updates to goals at this time.     Pt prognosis is Good. Pt will continue to benefit from skilled outpatient physical therapy to address the deficits listed in the problem list chart on initial evaluation, provide pt/family education and to maximize pt's level of independence in the home and community environment.     Medical necessity is demonstrated by the impairments and functional limitations listed on the Initial Evaluation.     Anticipated barriers to occupational therapy: none  Pt's spiritual, cultural and educational needs considered and pt agreeable to plan of care and goals.    Goals   Short term  1. Pt will improve MMT of the L thumb extension to 3/5 or greater by 4 weeks   2. Pt will improve FOTO limitation score to 55% or less by 4 weeks   3. Pt edema will decrease by .5 cm or greater by 4 weeks.   4. Pt will initiate HEP by 2 weeks.     Long term  1. Pt will improve MMT of the L thumb flexion/extension to 4/5 or greater prior to discharge  2. Pt will improve FOTO limitation score to 45% or less prior to discharge.   3. Pt edema will decrease by 1 cm or greater prior to discharge.   4. Pt will report 100% compliance/independent  with HEP prior to discharge.       Plan   Continue with established Plan of Care towards Occupational Therapy goals.   Discussed Plan of Care with patient: Yes    Marybeth Novak OT  7/7/2022

## 2022-07-14 ENCOUNTER — CLINICAL SUPPORT (OUTPATIENT)
Dept: REHABILITATION | Facility: HOSPITAL | Age: 52
End: 2022-07-14
Attending: ORTHOPAEDIC SURGERY
Payer: MEDICAID

## 2022-07-14 DIAGNOSIS — M65.312 TRIGGER FINGER OF LEFT THUMB: Primary | ICD-10-CM

## 2022-07-14 PROCEDURE — 97018 PARAFFIN BATH THERAPY: CPT | Mod: PN

## 2022-07-14 PROCEDURE — 97110 THERAPEUTIC EXERCISES: CPT | Mod: PN

## 2022-07-14 PROCEDURE — 97140 MANUAL THERAPY 1/> REGIONS: CPT | Mod: PN

## 2022-07-14 NOTE — PROGRESS NOTES
Occupational Therapy Daily Treatment Note   Name: Nely Salas 1970  MRN: [id number]    Visit Date: 7/14/2022  Visit #: 3 /12  Authorization period Expiration: 06/29/2023  Plan of Care Expiration: 10/10/2022  Precautions: standard    Time In: 10: 00  Time Out: 10: 45  Total 1:1 Treatment Time: 45 min    Treatment Diagnosis:   Encounter Diagnosis   Name Primary?    Trigger finger of left thumb Yes     Physician: Wiliam Katz,     Subjective   Pt reports: her left  still feeling weak ; she was given theraputty to take home to work on  strength   She was compliant with home exercise program.     Pain Scale:  4/10 on VAS currently  Pain Location: L hand/wrist    Objective   Nely received therapeutic exercises to develop strength, endurance, ROM and flexibility for 20 minutes including:  L thumb extension and abduction using rubber band for 10 reps each plane of motion  L hand  strengthening using green theraputty for 3 mins   L thumb dig into green theraputty for 2 mins  L hand  strengthening using power web for 2 mins    Nely received the following manual therapy techniques: Soft tissue Mobilization were applied to the: L wrist and hand for 15 minutes along with PROM in order to alleviate pain and improve stretch the muscles/tendons to prevent tightness /improve ROM    Nely received the following direct contact modalities after being cleared for contraindications: Paraffin bath was applied to the L hand for 10 mins in order to alleviate pain and relax the tendons/muscles within the hand/wrist.       Home Exercises and Education Provided     Education provided re:   - progress towards goals   - role of therapy in multi - disciplinary team, goals for therapy  Pt educated on condition, POC, and expectations in therapy.  No spiritual or educational barriers to learning provided    Home exercises:  Pt will be provided HEP during course of treatment with  progressions as appropriate. Pt was advised to perform these exercises free of pain, and to stop performing them if pain occurs.   Nely demonstrated good  understanding of the education provided.     Assessment   Nely is progressing well towards her goals and no updates to goals at this time.     Pt prognosis is Good. Pt will continue to benefit from skilled outpatient physical therapy to address the deficits listed in the problem list chart on initial evaluation, provide pt/family education and to maximize pt's level of independence in the home and community environment.     Medical necessity is demonstrated by the impairments and functional limitations listed on the Initial Evaluation.     Anticipated barriers to occupational therapy: none  Pt's spiritual, cultural and educational needs considered and pt agreeable to plan of care and goals.    Goals   Short term  1. Pt will improve MMT of the L thumb extension to 3/5 or greater by 4 weeks   2. Pt will improve FOTO limitation score to 55% or less by 4 weeks   3. Pt edema will decrease by .5 cm or greater by 4 weeks.   4. Pt will initiate HEP by 2 weeks.     Long term  1. Pt will improve MMT of the L thumb flexion/extension to 4/5 or greater prior to discharge  2. Pt will improve FOTO limitation score to 45% or less prior to discharge.   3. Pt edema will decrease by 1 cm or greater prior to discharge.   4. Pt will report 100% compliance/independent  with HEP prior to discharge.       Plan   Continue with established Plan of Care towards Occupational Therapy goals.   Discussed Plan of Care with patient: Yes    Marybeth Novak OT  7/14/2022

## 2022-07-19 ENCOUNTER — TELEPHONE (OUTPATIENT)
Dept: FAMILY MEDICINE | Facility: CLINIC | Age: 52
End: 2022-07-19
Payer: MEDICAID

## 2022-07-19 NOTE — TELEPHONE ENCOUNTER
Called pt to inform referral sent to [facility] Physician Dr Zaid Rob [phone](p) [phone](f) Dr Rakan Chandra [phone](p) [phone](f).  07/19/22

## 2022-07-26 ENCOUNTER — OFFICE VISIT (OUTPATIENT)
Dept: ORTHOPEDICS | Facility: CLINIC | Age: 52
End: 2022-07-26
Payer: MEDICAID

## 2022-07-26 ENCOUNTER — CLINICAL SUPPORT (OUTPATIENT)
Dept: REHABILITATION | Facility: HOSPITAL | Age: 52
End: 2022-07-26
Attending: ORTHOPAEDIC SURGERY
Payer: MEDICAID

## 2022-07-26 VITALS — BODY MASS INDEX: 48.82 KG/M2 | HEIGHT: 65 IN | WEIGHT: 293 LBS | RESPIRATION RATE: 18 BRPM

## 2022-07-26 DIAGNOSIS — S46.012A TRAUMATIC INCOMPLETE TEAR OF LEFT ROTATOR CUFF, INITIAL ENCOUNTER: Primary | ICD-10-CM

## 2022-07-26 DIAGNOSIS — M65.312 TRIGGER FINGER OF LEFT THUMB: Primary | ICD-10-CM

## 2022-07-26 DIAGNOSIS — M75.42 IMPINGEMENT SYNDROME OF BOTH SHOULDERS: ICD-10-CM

## 2022-07-26 DIAGNOSIS — M19.011 ARTHRITIS OF BOTH ACROMIOCLAVICULAR JOINTS: ICD-10-CM

## 2022-07-26 DIAGNOSIS — G89.29 CHRONIC PAIN OF BOTH SHOULDERS: ICD-10-CM

## 2022-07-26 DIAGNOSIS — M19.012 ARTHRITIS OF BOTH ACROMIOCLAVICULAR JOINTS: ICD-10-CM

## 2022-07-26 DIAGNOSIS — M25.511 CHRONIC PAIN OF BOTH SHOULDERS: ICD-10-CM

## 2022-07-26 DIAGNOSIS — M25.512 CHRONIC PAIN OF BOTH SHOULDERS: ICD-10-CM

## 2022-07-26 DIAGNOSIS — M75.41 IMPINGEMENT SYNDROME OF BOTH SHOULDERS: ICD-10-CM

## 2022-07-26 DIAGNOSIS — Z98.890 S/P TRIGGER FINGER RELEASE: ICD-10-CM

## 2022-07-26 PROCEDURE — 3008F BODY MASS INDEX DOCD: CPT | Mod: CPTII,,, | Performed by: ORTHOPAEDIC SURGERY

## 2022-07-26 PROCEDURE — 97035 APP MDLTY 1+ULTRASOUND EA 15: CPT | Mod: PN

## 2022-07-26 PROCEDURE — 97140 MANUAL THERAPY 1/> REGIONS: CPT | Mod: PN

## 2022-07-26 PROCEDURE — 97110 THERAPEUTIC EXERCISES: CPT | Mod: PN

## 2022-07-26 PROCEDURE — 99999 PR PBB SHADOW E&M-EST. PATIENT-LVL III: ICD-10-PCS | Mod: PBBFAC,,, | Performed by: ORTHOPAEDIC SURGERY

## 2022-07-26 PROCEDURE — 99213 PR OFFICE/OUTPT VISIT, EST, LEVL III, 20-29 MIN: ICD-10-PCS | Mod: S$PBB,24,, | Performed by: ORTHOPAEDIC SURGERY

## 2022-07-26 PROCEDURE — 4010F ACE/ARB THERAPY RXD/TAKEN: CPT | Mod: CPTII,,, | Performed by: ORTHOPAEDIC SURGERY

## 2022-07-26 PROCEDURE — 99213 OFFICE O/P EST LOW 20 MIN: CPT | Mod: PBBFAC,PN | Performed by: ORTHOPAEDIC SURGERY

## 2022-07-26 PROCEDURE — 3008F PR BODY MASS INDEX (BMI) DOCUMENTED: ICD-10-PCS | Mod: CPTII,,, | Performed by: ORTHOPAEDIC SURGERY

## 2022-07-26 PROCEDURE — 99213 OFFICE O/P EST LOW 20 MIN: CPT | Mod: S$PBB,24,, | Performed by: ORTHOPAEDIC SURGERY

## 2022-07-26 PROCEDURE — 99999 PR PBB SHADOW E&M-EST. PATIENT-LVL III: CPT | Mod: PBBFAC,,, | Performed by: ORTHOPAEDIC SURGERY

## 2022-07-26 PROCEDURE — 4010F PR ACE/ARB THEARPY RXD/TAKEN: ICD-10-PCS | Mod: CPTII,,, | Performed by: ORTHOPAEDIC SURGERY

## 2022-07-26 PROCEDURE — 1159F PR MEDICATION LIST DOCUMENTED IN MEDICAL RECORD: ICD-10-PCS | Mod: CPTII,,, | Performed by: ORTHOPAEDIC SURGERY

## 2022-07-26 PROCEDURE — 1159F MED LIST DOCD IN RCRD: CPT | Mod: CPTII,,, | Performed by: ORTHOPAEDIC SURGERY

## 2022-07-26 NOTE — PROGRESS NOTES
Occupational Therapy Daily Treatment Note   Name: Nely Salas 1970  MRN: [id number]    Visit Date: 7/26/2022  Visit #: 4 /12  Authorization period Expiration: 06/29/2023  Plan of Care Expiration: 10/10/2022  Precautions: standard    Time In: 10: 30  Time Out: 11: 05  Total 1:1 Treatment Time: 35 min    Treatment Diagnosis:   Encounter Diagnosis   Name Primary?    Trigger finger of left thumb Yes     Physician: Wiliam Katz,     Subjective   Pt reports: her left  still feeling weak ; she was given theraputty to take home to work on  strength   She was compliant with home exercise program.     Pain Scale:  4/10 on VAS currently  Pain Location: L hand/wrist    Objective   Nely received therapeutic exercises to develop strength, endurance, ROM and flexibility for 10 minutes including:  L hand abduction AROM for 15 reps  L hand  strengthening using yellow theraputty for 2 mins   L wrist strengthening using 2 # wt for flexion/extensio for 3 x 10 reps    Nely received the following manual therapy techniques: Soft tissue Mobilization were applied to the: L wrist and hand for 15 minutes along with PROM in order to alleviate pain and improve stretch the muscles/tendons to prevent tightness /improve ROM    Nely received the following direct contact modalities after being cleared for contraindications: US was applied to the L hand along the incision site to help break up scar tissue and prevent tightness at the webspace for 10 mins      Home Exercises and Education Provided     Education provided re:   - progress towards goals   - role of therapy in multi - disciplinary team, goals for therapy  Pt educated on condition, POC, and expectations in therapy.  No spiritual or educational barriers to learning provided    Home exercises:  Pt will be provided HEP during course of treatment with progressions as appropriate. Pt was advised to perform these exercises free of  pain, and to stop performing them if pain occurs.   Nely demonstrated good  understanding of the education provided.     Assessment   Nely is progressing well towards her goals and no updates to goals at this time. Pt states that there is some pain still in the L hand but has seen improvements in overall range. Pt scar tissue build up has improved significantly since initial eval along with increased AROM of the L thumb.     Pt prognosis is Good. Pt will continue to benefit from skilled outpatient physical therapy to address the deficits listed in the problem list chart on initial evaluation, provide pt/family education and to maximize pt's level of independence in the home and community environment.     Medical necessity is demonstrated by the impairments and functional limitations listed on the Initial Evaluation.     Anticipated barriers to occupational therapy: none  Pt's spiritual, cultural and educational needs considered and pt agreeable to plan of care and goals.    Goals   Short term  1. Pt will improve MMT of the L thumb extension to 3/5 or greater by 4 weeks   2. Pt will improve FOTO limitation score to 55% or less by 4 weeks   3. Pt edema will decrease by .5 cm or greater by 4 weeks.   4. Pt will initiate HEP by 2 weeks.     Long term  1. Pt will improve MMT of the L thumb flexion/extension to 4/5 or greater prior to discharge  2. Pt will improve FOTO limitation score to 45% or less prior to discharge.   3. Pt edema will decrease by 1 cm or greater prior to discharge.   4. Pt will report 100% compliance/independent  with HEP prior to discharge.       Plan   Continue with established Plan of Care towards Occupational Therapy goals.   Discussed Plan of Care with patient: Yes    Marybeth Novak OT  7/26/2022

## 2022-07-26 NOTE — PROGRESS NOTES
"  Patient ID: Nely Salas is a 52 y.o. female.     Chief Complaint: Pain of the Left Shoulder and Pain of the Right Shoulder        HPI:  Ms. Salas returned today withcomplaints of persistent pain in her left shoulder.  She stated that her hand feels good and is not having pain or catching in her left hand.  At her last visit on 0 6/28/2022 she was given a steroid injection to her left shoulder which did not resolve her symptoms.  She has had 1 year of bilateral shoulder pain after she was involved in a [employer]" last year.  Circumduction, forward flexion, and abduction increases her symptoms.   Her symptoms awaken her at night.  She has taken NSAIDs with help.  She has not worn a brace.      ROS:  No new diagnosis/surgery/prescriptions since last office visit on 06/28/2022.  Constitution: Negative for chills and fever.   HENT: Negative for congestion.    Eyes: Negative for blurred vision.   Cardiovascular: Negative for chest pain.   Respiratory: Negative for cough.    Endocrine: Negative for polydipsia.   Hematologic/Lymphatic: Negative for adenopathy.   Skin: Negative for flushing and itching.   Musculoskeletal: Positive for back pain and joint pain. Negative for gout.   Gastrointestinal: Positive for heartburn. Negative for constipation and diarrhea.   Genitourinary: Negative for nocturia.   Neurological: Positive for headaches. Negative for seizures.   Psychiatric/Behavioral: Negative for depression and substance abuse. The patient is nervous/anxious.    Allergic/Immunologic: Positive for environmental allergies.       Objective:   Physical Exam:   General: AAOx3.  No acute distress  Vascular:  Pulses intact and equal bilaterally.  Capillary refill less than 3 seconds and equal bilaterally  Neurologic:  Pinprick and soft touch intact and equal bilaterally.  Spurling's positive  Integment:  No ecchymosis, no errythema  Extremity:  Shoulder:  Forward flexion/abduction equal bilaterally 0/170 " degrees.  Internal rotation equal bilaterally L1.  Negative drop-arm both shoulders.  Negative lift-off both shoulders.  Full can negative both shoulders.  Empty can negative both shoulders.  Benjamin/Neer positive primarily left shoulder with some mild right shoulder.  Cross-arm negative both shoulders.  Nontender over the AC joint bilaterally.  Nontender in the bicipital groove bilaterally.  Yergason's negative bilaterally.  Apprehension/relocation negative bilaterally.                      Hand:  Pronation/supination equal bilaterally.  Dorsiflexion/volar flexion equal bilaterally.  Nontender with thumb motion of her left hand.  Nontender with palpation volar base left hand.  No triggering left thumb.  Radiography:   No new x-rays done today.      Assessment:       Impression:      1. Traumatic incomplete tear of left rotator cuff   2. Impingement syndrome of both shoulders    3. Arthritis of both acromioclavicular joints    4. Chronic pain of both shoulders    5. Status post trigger thumb release, left hand          Plan:       1.  Discussed physical examination with the patient. Nely understands that she has a partial-thickness rotator cuff tear of her left shoulder along with impingement.  She also has AC arthritis.  Treatment alternatives and outcomes were discussed with the patient she understands she could be treated conservatively with observation, activity modification, NSAIDs, bracing, physical therapy, injections, or she could consider surgical intervention such as arthroscopy with rotator cuff repair.  Before any surgery can be recommended an MRI is warranted.  2. Refer for an MRI of the left shoulder  3. Continue with home exercises previously shown discussed.  4. Continue with NSAIDs as tolerated and allowed by PCM.  5. Final recommendations after MRI is completed.  6. Follow-up after MRI..

## 2022-08-05 ENCOUNTER — CLINICAL SUPPORT (OUTPATIENT)
Dept: REHABILITATION | Facility: HOSPITAL | Age: 52
End: 2022-08-05
Payer: MEDICAID

## 2022-08-05 DIAGNOSIS — M65.312 TRIGGER FINGER OF LEFT THUMB: Primary | ICD-10-CM

## 2022-08-05 PROCEDURE — 97018 PARAFFIN BATH THERAPY: CPT | Mod: PN

## 2022-08-05 PROCEDURE — 97140 MANUAL THERAPY 1/> REGIONS: CPT | Mod: PN

## 2022-08-05 PROCEDURE — 97035 APP MDLTY 1+ULTRASOUND EA 15: CPT | Mod: PN

## 2022-08-05 NOTE — PROGRESS NOTES
Occupational Therapy Daily Treatment Note   Name: Nely Salas 1970  MRN: [id number]    Visit Date: 8/5/2022  Visit #: 5 /12  Authorization period Expiration: 06/29/2023  Plan of Care Expiration: 10/10/2022  Precautions: standard    Time In: 12: 20  Time Out: 1: 05  Total 1:1 Treatment Time: 45 min    Treatment Diagnosis:   Encounter Diagnosis   Name Primary?    Trigger finger of left thumb Yes     Physician: Wiliam Katz,     Subjective   Pt reports: wrist is in pain; discussed her purchasing a compression glove to help with edema and pain   She was compliant with home exercise program.     Pain Scale:  6 /10 on VAS currently  Pain Location: L hand/wrist    Objective     Nely received the following manual therapy techniques: Soft tissue Mobilization were applied to the: L wrist and hand for 25 minutes along with PROM in order to alleviate pain and improve stretch the muscles/tendons to prevent tightness /improve ROM    Nely received the following direct contact modalities after being cleared for contraindications:   -US was applied to the L hand along the incision site to help break up scar tissue and prevent tightness at the webspace for 10 mins  -Paraffin bath to the L hand for 10 minutes     Home Exercises and Education Provided     Education provided re:   - progress towards goals   - role of therapy in multi - disciplinary team, goals for therapy  Pt educated on condition, POC, and expectations in therapy.  No spiritual or educational barriers to learning provided    Home exercises:  Pt will be provided HEP during course of treatment with progressions as appropriate. Pt was advised to perform these exercises free of pain, and to stop performing them if pain occurs.   Nely demonstrated good  understanding of the education provided.     Assessment   Nely is progressing well towards her goals and no updates to goals at this time. Pt states that there is some pain  still in the L hand but has seen improvements in overall range. Pt scar tissue build up has improved significantly since initial eval along with increased AROM of the L thumb.     Pt prognosis is Good. Pt will continue to benefit from skilled outpatient physical therapy to address the deficits listed in the problem list chart on initial evaluation, provide pt/family education and to maximize pt's level of independence in the home and community environment.     Medical necessity is demonstrated by the impairments and functional limitations listed on the Initial Evaluation.     Anticipated barriers to occupational therapy: none  Pt's spiritual, cultural and educational needs considered and pt agreeable to plan of care and goals.    Goals   Short term  1. Pt will improve MMT of the L thumb extension to 3/5 or greater by 4 weeks   2. Pt will improve FOTO limitation score to 55% or less by 4 weeks   3. Pt edema will decrease by .5 cm or greater by 4 weeks.   4. Pt will initiate HEP by 2 weeks.     Long term  1. Pt will improve MMT of the L thumb flexion/extension to 4/5 or greater prior to discharge  2. Pt will improve FOTO limitation score to 45% or less prior to discharge.   3. Pt edema will decrease by 1 cm or greater prior to discharge.   4. Pt will report 100% compliance/independent  with HEP prior to discharge.       Plan   Continue with established Plan of Care towards Occupational Therapy goals.   Discussed Plan of Care with patient: Yes    Marybeth Novak OT  8/5/2022

## 2022-08-11 ENCOUNTER — CLINICAL SUPPORT (OUTPATIENT)
Dept: REHABILITATION | Facility: HOSPITAL | Age: 52
End: 2022-08-11
Payer: MEDICAID

## 2022-08-11 DIAGNOSIS — M65.312 TRIGGER FINGER OF LEFT THUMB: Primary | ICD-10-CM

## 2022-08-11 PROCEDURE — 97035 APP MDLTY 1+ULTRASOUND EA 15: CPT | Mod: PN

## 2022-08-11 PROCEDURE — 97018 PARAFFIN BATH THERAPY: CPT | Mod: PN

## 2022-08-11 PROCEDURE — 97140 MANUAL THERAPY 1/> REGIONS: CPT | Mod: PN

## 2022-08-11 NOTE — PROGRESS NOTES
Occupational Therapy Daily Treatment Note   Name: Nely Salas 1970  MRN: [id number]    Visit Date: 8/11/2022  Visit #: 6 /12  Authorization period Expiration: 06/29/2023  Plan of Care Expiration: 10/10/2022  Precautions: standard    Time In: 10: 10  Time Out: 10: 55  Total 1:1 Treatment Time: 45 min    Treatment Diagnosis:   Encounter Diagnosis   Name Primary?    Trigger finger of left thumb Yes     Physician: Wiliam Katz,     Subjective   Pt reports: wrist is feeling better with less pain   She was compliant with home exercise program.     Pain Scale:  5 /10 on VAS currently  Pain Location: L hand/wrist    Objective     Nely received the following manual therapy techniques: Soft tissue Mobilization were applied to the: L wrist and hand for 25 minutes along with PROM in order to alleviate pain and improve stretch the muscles/tendons to prevent tightness /improve ROM    Nely received the following direct contact modalities after being cleared for contraindications:   -US was applied to the L hand along the incision site to help break up scar tissue and prevent tightness at the webspace for 10 mins  -Paraffin bath to the L hand for 10 minutes     Home Exercises and Education Provided     Education provided re:   - progress towards goals   - role of therapy in multi - disciplinary team, goals for therapy  Pt educated on condition, POC, and expectations in therapy.  No spiritual or educational barriers to learning provided    Home exercises:  Pt will be provided HEP during course of treatment with progressions as appropriate. Pt was advised to perform these exercises free of pain, and to stop performing them if pain occurs.   Nely demonstrated good  understanding of the education provided.     Assessment   Nely is progressing well towards her goals and no updates to goals at this time. Pt states that there is some pain still in the L hand but has seen improvements in  overall range. Pt scar tissue build up has improved significantly since initial eval along with increased AROM of the L thumb.     Pt prognosis is Good. Pt will continue to benefit from skilled outpatient physical therapy to address the deficits listed in the problem list chart on initial evaluation, provide pt/family education and to maximize pt's level of independence in the home and community environment.     Medical necessity is demonstrated by the impairments and functional limitations listed on the Initial Evaluation.     Anticipated barriers to occupational therapy: none  Pt's spiritual, cultural and educational needs considered and pt agreeable to plan of care and goals.    Goals   Short term  1. Pt will improve MMT of the L thumb extension to 3/5 or greater by 4 weeks   2. Pt will improve FOTO limitation score to 55% or less by 4 weeks   3. Pt edema will decrease by .5 cm or greater by 4 weeks.   4. Pt will initiate HEP by 2 weeks.     Long term  1. Pt will improve MMT of the L thumb flexion/extension to 4/5 or greater prior to discharge  2. Pt will improve FOTO limitation score to 45% or less prior to discharge.   3. Pt edema will decrease by 1 cm or greater prior to discharge.   4. Pt will report 100% compliance/independent  with HEP prior to discharge.       Plan   Continue with established Plan of Care towards Occupational Therapy goals.   Discussed Plan of Care with patient: Yes    Marybeth Novak OT  8/11/2022

## 2022-08-17 ENCOUNTER — CLINICAL SUPPORT (OUTPATIENT)
Dept: REHABILITATION | Facility: HOSPITAL | Age: 52
End: 2022-08-17
Payer: MEDICAID

## 2022-08-17 DIAGNOSIS — M65.312 TRIGGER FINGER OF LEFT THUMB: Primary | ICD-10-CM

## 2022-08-17 PROCEDURE — 97140 MANUAL THERAPY 1/> REGIONS: CPT | Mod: PN

## 2022-08-17 PROCEDURE — 97018 PARAFFIN BATH THERAPY: CPT | Mod: PN

## 2022-08-17 PROCEDURE — 97110 THERAPEUTIC EXERCISES: CPT | Mod: PN

## 2022-08-17 NOTE — PROGRESS NOTES
"                            Occupational Therapy Daily Treatment Note   Name: Nely Salas 1970  MRN: [id number]    Visit Date: 8/17/2022  Visit #: 7 /12  Authorization period Expiration: 06/29/2023  Plan of Care Expiration: 10/10/2022  Precautions: standard    Time In: 10: 15  Time Out:11:00  Total 1:1 Treatment Time: 45 min    Treatment Diagnosis:   Encounter Diagnosis   Name Primary?    Trigger finger of left thumb Yes     Physician: Wiliam Katz,     Subjective   Pt reports: "My wrist is getting better."  She was compliant with home exercise program.     Pain Scale:  3 /10 on VAS currently  Pain Location: L hand/wrist    Objective   Nely received the following therapeutic exercises:  -Patient performed hawk  with left hand 5x10 reps.  -Patient performed LUE wrist flexion/extension with 3# dumbbell 5x10 reps.  -Patient performed left thumb extension with blue rubber band 3x10 reps.    Nely received the following manual therapy techniques: Soft tissue Mobilization were applied to the: L wrist and hand for 25 minutes along with PROM in order to alleviate pain and improve stretch the muscles/tendons to prevent tightness /improve ROM    Nely received the following direct contact modalities after being cleared for contraindications:   -Paraffin bath to the L hand for 10 minutes   -IFC was applied to left wrist at 7.5 volts cv for 10 minutes.    Home Exercises and Education Provided     Education provided re:   - progress towards goals   - role of therapy in multi - disciplinary team, goals for therapy  Pt educated on condition, POC, and expectations in therapy.  No spiritual or educational barriers to learning provided    Home exercises:  Pt will be provided HEP during course of treatment with progressions as appropriate. Pt was advised to perform these exercises free of pain, and to stop performing them if pain occurs.   Nely demonstrated good  understanding of the education provided. "     Assessment   Nely is progressing well towards her goals and no updates to goals at this time. Pt states that there is some pain still in the L hand but has seen improvements in overall range. Pt scar tissue build up has improved significantly since initial eval along with increased AROM of the L thumb.     Pt prognosis is Good. Pt will continue to benefit from skilled outpatient physical therapy to address the deficits listed in the problem list chart on initial evaluation, provide pt/family education and to maximize pt's level of independence in the home and community environment.     Medical necessity is demonstrated by the impairments and functional limitations listed on the Initial Evaluation.     Anticipated barriers to occupational therapy: none  Pt's spiritual, cultural and educational needs considered and pt agreeable to plan of care and goals.    Goals   Short term  1. Pt will improve MMT of the L thumb extension to 3/5 or greater by 4 weeks   2. Pt will improve FOTO limitation score to 55% or less by 4 weeks   3. Pt edema will decrease by .5 cm or greater by 4 weeks.   4. Pt will initiate HEP by 2 weeks.     Long term  1. Pt will improve MMT of the L thumb flexion/extension to 4/5 or greater prior to discharge  2. Pt will improve FOTO limitation score to 45% or less prior to discharge.   3. Pt edema will decrease by 1 cm or greater prior to discharge.   4. Pt will report 100% compliance/independent  with HEP prior to discharge.       Plan   Continue with established Plan of Care towards Occupational Therapy goals.   Discussed Plan of Care with patient: Yes    Maldonado Lucas, OT  8/17/2022

## 2022-08-25 ENCOUNTER — CLINICAL SUPPORT (OUTPATIENT)
Dept: REHABILITATION | Facility: HOSPITAL | Age: 52
End: 2022-08-25
Payer: MEDICAID

## 2022-08-25 DIAGNOSIS — M65.312 TRIGGER FINGER OF LEFT THUMB: Primary | ICD-10-CM

## 2022-08-25 PROCEDURE — 97110 THERAPEUTIC EXERCISES: CPT | Mod: PN

## 2022-08-25 PROCEDURE — 97018 PARAFFIN BATH THERAPY: CPT | Mod: PN

## 2022-08-25 PROCEDURE — 97014 ELECTRIC STIMULATION THERAPY: CPT | Mod: PN

## 2022-08-25 NOTE — PROGRESS NOTES
"                            Occupational Therapy Daily Treatment Note   Name: Nely Salas 1970  MRN: [id number]    Visit Date: 8/25/2022  Visit #: 8 /12  Authorization period Expiration: 06/29/2023  Plan of Care Expiration: 10/10/2022  Precautions: standard    Time In: 9:30  Time Out:10:15  Total 1:1 Treatment Time: 45 min    Treatment Diagnosis:   Encounter Diagnosis   Name Primary?    Trigger finger of left thumb Yes     Physician: Wiliam Katz,     Subjective   Pt reports: "My wrist feels good."  She was compliant with home exercise program.     Pain Scale:  3 /10 on VAS currently  Pain Location: L hand/wrist    Objective   Nely received the following therapeutic exercises:  -Patient performed hawk  with left hand 5x10 reps.  -Patient performed LUE wrist flexion/extension with 3# dumbbell 5x10 reps.  -Patient performed left thumb extension with blue rubber band 3x10 reps.  -Patient performed wrist gadget 5x10 reps.      Nely received the following direct contact modalities after being cleared for contraindications:   -Paraffin bath to the L hand for 10 minutes   -IFC was applied to left wrist at 12.0 volts cv for 10 minutes.    Home Exercises and Education Provided     Education provided re:   - progress towards goals   - role of therapy in multi - disciplinary team, goals for therapy  Pt educated on condition, POC, and expectations in therapy.  No spiritual or educational barriers to learning provided    Home exercises:  Pt will be provided HEP during course of treatment with progressions as appropriate. Pt was advised to perform these exercises free of pain, and to stop performing them if pain occurs.   Nely demonstrated good  understanding of the education provided.     Assessment   Nely is progressing well towards her goals and no updates to goals at this time. Pt states that there is some pain still in the L hand but has seen improvements in overall range. Pt scar tissue build up " has improved significantly since initial eval along with increased AROM of the L thumb.     Pt prognosis is Good. Pt will continue to benefit from skilled outpatient physical therapy to address the deficits listed in the problem list chart on initial evaluation, provide pt/family education and to maximize pt's level of independence in the home and community environment.     Medical necessity is demonstrated by the impairments and functional limitations listed on the Initial Evaluation.     Anticipated barriers to occupational therapy: none  Pt's spiritual, cultural and educational needs considered and pt agreeable to plan of care and goals.    Goals   Short term  1. Pt will improve MMT of the L thumb extension to 3/5 or greater by 4 weeks   2. Pt will improve FOTO limitation score to 55% or less by 4 weeks   3. Pt edema will decrease by .5 cm or greater by 4 weeks.   4. Pt will initiate HEP by 2 weeks.     Long term  1. Pt will improve MMT of the L thumb flexion/extension to 4/5 or greater prior to discharge  2. Pt will improve FOTO limitation score to 45% or less prior to discharge.   3. Pt edema will decrease by 1 cm or greater prior to discharge.   4. Pt will report 100% compliance/independent  with HEP prior to discharge.       Plan   Continue with established Plan of Care towards Occupational Therapy goals.   Discussed Plan of Care with patient: Yes    Maldonado Lucas, OT  8/25/2022

## 2022-08-31 ENCOUNTER — CLINICAL SUPPORT (OUTPATIENT)
Dept: REHABILITATION | Facility: HOSPITAL | Age: 52
End: 2022-08-31
Payer: MEDICAID

## 2022-08-31 DIAGNOSIS — M65.312 TRIGGER FINGER OF LEFT THUMB: Primary | ICD-10-CM

## 2022-08-31 PROCEDURE — 97110 THERAPEUTIC EXERCISES: CPT | Mod: PN

## 2022-08-31 PROCEDURE — 97014 ELECTRIC STIMULATION THERAPY: CPT | Mod: PN

## 2022-08-31 NOTE — PROGRESS NOTES
"                            Occupational Therapy Daily Treatment Note   Name: Nely Salas 1970  MRN: [id number]    Visit Date: 8/31/2022  Visit #: 9/12  Authorization period Expiration: 06/29/2023  Plan of Care Expiration: 10/10/2022  Precautions: standard    Time In: 10:00  Time Out:10:45  Total 1:1 Treatment Time: 45 min    Treatment Diagnosis:   Encounter Diagnosis   Name Primary?    Trigger finger of left thumb Yes     Physician: Wiliam Katz,     Subjective   Pt reports: "My wrist is healing."  She was compliant with home exercise program.     Pain Scale:  2/10 on VAS currently  Pain Location: L hand/wrist    Objective   Nely received the following therapeutic exercises:  -Patient performed hawk  with left hand 5x10 reps.  -Patient performed LUE wrist flexion/extension with 4# dumbbell 5x10 reps.  -Patient performed left thumb extension with blue rubber band 3x10 reps.  -Patient performed wrist gadget 5x10 reps.    Nely received the following manual therapy:  -Patient received deep massage to left hand in effort to break up scar tissue.      Nely received the following direct contact modalities after being cleared for contraindications:   -Paraffin bath to the L hand for 10 minutes   -IFC was applied to left wrist at 12.0 volts cv for 10 minutes.    Home Exercises and Education Provided     Education provided re:   - progress towards goals   - role of therapy in multi - disciplinary team, goals for therapy  Pt educated on condition, POC, and expectations in therapy.  No spiritual or educational barriers to learning provided    Home exercises:  Pt will be provided HEP during course of treatment with progressions as appropriate. Pt was advised to perform these exercises free of pain, and to stop performing them if pain occurs.   Nely demonstrated good  understanding of the education provided.     Assessment   Nely is progressing well towards her goals and no updates to goals at this " time. Pt states that there is some pain still in the L hand but has seen improvements in overall range. Pt scar tissue build up has improved significantly since initial eval along with increased AROM of the L thumb.     Pt prognosis is Good. Pt will continue to benefit from skilled outpatient physical therapy to address the deficits listed in the problem list chart on initial evaluation, provide pt/family education and to maximize pt's level of independence in the home and community environment.     Medical necessity is demonstrated by the impairments and functional limitations listed on the Initial Evaluation.     Anticipated barriers to occupational therapy: none  Pt's spiritual, cultural and educational needs considered and pt agreeable to plan of care and goals.    Goals   Short term  1. Pt will improve MMT of the L thumb extension to 3/5 or greater by 4 weeks   2. Pt will improve FOTO limitation score to 55% or less by 4 weeks   3. Pt edema will decrease by .5 cm or greater by 4 weeks.   4. Pt will initiate HEP by 2 weeks.     Long term  1. Pt will improve MMT of the L thumb flexion/extension to 4/5 or greater prior to discharge  2. Pt will improve FOTO limitation score to 45% or less prior to discharge.   3. Pt edema will decrease by 1 cm or greater prior to discharge.   4. Pt will report 100% compliance/independent  with HEP prior to discharge.       Plan   Continue with established Plan of Care towards Occupational Therapy goals.   Discussed Plan of Care with patient: Yes    Maldonado Lucas OT  8/31/2022

## 2022-09-01 ENCOUNTER — TELEPHONE (OUTPATIENT)
Dept: ORTHOPEDICS | Facility: CLINIC | Age: 52
End: 2022-09-01
Payer: MEDICAID

## 2022-09-01 NOTE — TELEPHONE ENCOUNTER
Returned call. Patient stated she has had her MRI completed and needed an appointment to get her results. Patient was agreeable to a appointment on 9/7/22 in [facility]    ----- Message from Ion Sanchez sent at 9/1/2022 11:16 AM CDT -----  Regarding: est medicaid pt wants appt, call pt   Contact: pt   est medicaid pt wants appt, call pt

## 2022-09-02 ENCOUNTER — CLINICAL SUPPORT (OUTPATIENT)
Dept: REHABILITATION | Facility: HOSPITAL | Age: 52
End: 2022-09-02
Payer: MEDICAID

## 2022-09-02 DIAGNOSIS — M65.312 TRIGGER FINGER OF LEFT THUMB: Primary | ICD-10-CM

## 2022-09-02 PROCEDURE — 97014 ELECTRIC STIMULATION THERAPY: CPT | Mod: PN

## 2022-09-02 PROCEDURE — 97110 THERAPEUTIC EXERCISES: CPT | Mod: PN

## 2022-09-02 NOTE — PROGRESS NOTES
"                            Occupational Therapy Daily Treatment Note   Name: Nely Salas 1970  MRN: [id number]    Visit Date: 9/2/2022  Visit #: 10/12  Authorization period Expiration: 06/29/2023  Plan of Care Expiration: 10/10/2022  Precautions: standard    Time In: 10:55  Time Out:11:40  Total 1:1 Treatment Time: 45 min    Treatment Diagnosis:   Encounter Diagnosis   Name Primary?    Trigger finger of left thumb Yes     Physician: Wiliam Katz,     Subjective   Pt reports: "My wrist is getting stronger."  She was compliant with home exercise program.     Pain Scale:  2/10 on VAS currently  Pain Location: L hand/wrist    Objective   Nely received the following therapeutic exercises:  -Patient performed hawk  with left hand 5x10 reps.  -Patient performed LUE wrist flexion/extension with 5# dumbbell 5x10 reps.  -Patient performed left wrist circumduction 5x10 reps.  -Patient performed wrist gadget 5x10 reps.    Nely received the following manual therapy:  -Patient received deep massage to left hand in effort to break up scar tissue.      Nely received the following direct contact modalities after being cleared for contraindications:   -Paraffin bath to the L hand for 10 minutes   -IFC was applied to left wrist at 12.0 volts cv for 10 minutes.    Home Exercises and Education Provided     Education provided re:   - progress towards goals   - role of therapy in multi - disciplinary team, goals for therapy  Pt educated on condition, POC, and expectations in therapy.  No spiritual or educational barriers to learning provided    Home exercises:  Pt will be provided HEP during course of treatment with progressions as appropriate. Pt was advised to perform these exercises free of pain, and to stop performing them if pain occurs.   Nely demonstrated good  understanding of the education provided.     Assessment   Nely is progressing well towards her goals and no updates to goals at this time. Pt " states that there is some pain still in the L hand but has seen improvements in overall range. Pt scar tissue build up has improved significantly since initial eval along with increased AROM of the L thumb.     Pt prognosis is Good. Pt will continue to benefit from skilled outpatient physical therapy to address the deficits listed in the problem list chart on initial evaluation, provide pt/family education and to maximize pt's level of independence in the home and community environment.     Medical necessity is demonstrated by the impairments and functional limitations listed on the Initial Evaluation.     Anticipated barriers to occupational therapy: none  Pt's spiritual, cultural and educational needs considered and pt agreeable to plan of care and goals.    Goals   Short term  1. Pt will improve MMT of the L thumb extension to 3/5 or greater by 4 weeks   2. Pt will improve FOTO limitation score to 55% or less by 4 weeks   3. Pt edema will decrease by .5 cm or greater by 4 weeks.   4. Pt will initiate HEP by 2 weeks.     Long term  1. Pt will improve MMT of the L thumb flexion/extension to 4/5 or greater prior to discharge  2. Pt will improve FOTO limitation score to 45% or less prior to discharge.   3. Pt edema will decrease by 1 cm or greater prior to discharge.   4. Pt will report 100% compliance/independent  with HEP prior to discharge.       Plan   Continue with established Plan of Care towards Occupational Therapy goals.   Discussed Plan of Care with patient: Yes    Maldonado Lucas OT  9/2/2022

## 2022-09-12 ENCOUNTER — CLINICAL SUPPORT (OUTPATIENT)
Dept: REHABILITATION | Facility: HOSPITAL | Age: 52
End: 2022-09-12
Payer: MEDICAID

## 2022-09-12 DIAGNOSIS — M65.312 TRIGGER FINGER OF LEFT THUMB: Primary | ICD-10-CM

## 2022-09-12 PROCEDURE — 97110 THERAPEUTIC EXERCISES: CPT | Mod: PN

## 2022-09-12 PROCEDURE — 97014 ELECTRIC STIMULATION THERAPY: CPT | Mod: PN

## 2022-09-12 PROCEDURE — 97018 PARAFFIN BATH THERAPY: CPT | Mod: PN

## 2022-09-12 NOTE — PROGRESS NOTES
"                            Occupational Therapy Daily Treatment Note   Name: Nely Salas 1970  MRN: [id number]    Visit Date: 9/12/2022  Visit #: 11/12  Authorization period Expiration: 06/29/2023  Plan of Care Expiration: 10/10/2022  Precautions: standard    Time In: 10:50  Time Out:11:30  Total 1:1 Treatment Time: 40 min    Treatment Diagnosis:   Encounter Diagnosis   Name Primary?    Trigger finger of left thumb Yes     Physician: Wiliam Katz,     Subjective   Pt reports: "My wrist is getting stronger."  She was compliant with home exercise program.     Pain Scale:  2/10 on VAS currently  Pain Location: L hand/wrist    Objective   Nely received the following therapeutic exercises:  -Patient performed hawk  with left hand 5x10 reps.  -Patient performed LUE AROM for radial/ulnar deviation 5x10 reps.  -Patient performed LUE wrist flexion/extension with 5# dumbbell 5x10 reps.  -Patient performed left wrist circumduction 5x10 reps.  -Patient performed three jaw palomo with red clothespin 3x10 reps with LUE.      Nely received the following manual therapy:  -Patient received deep massage to left hand in effort to break up scar tissue.      Nely received the following direct contact modalities after being cleared for contraindications:   -Paraffin bath to the L hand for 10 minutes   -IFC was applied to left wrist at 12.5 volts cv for 10 minutes.    Home Exercises and Education Provided     Education provided re:   - progress towards goals   - role of therapy in multi - disciplinary team, goals for therapy  Pt educated on condition, POC, and expectations in therapy.  No spiritual or educational barriers to learning provided    Home exercises:  Pt will be provided HEP during course of treatment with progressions as appropriate. Pt was advised to perform these exercises free of pain, and to stop performing them if pain occurs.   Nely demonstrated good  understanding of the education provided. "     Assessment   Nely is progressing well towards her goals and no updates to goals at this time. Pt states that there is some pain still in the L hand but has seen improvements in overall range. Pt scar tissue build up has improved significantly since initial eval along with increased AROM of the L thumb.     Pt prognosis is Good. Pt will continue to benefit from skilled outpatient physical therapy to address the deficits listed in the problem list chart on initial evaluation, provide pt/family education and to maximize pt's level of independence in the home and community environment.     Medical necessity is demonstrated by the impairments and functional limitations listed on the Initial Evaluation.     Anticipated barriers to occupational therapy: none  Pt's spiritual, cultural and educational needs considered and pt agreeable to plan of care and goals.    Goals   Short term  1. Pt will improve MMT of the L thumb extension to 3/5 or greater by 4 weeks   2. Pt will improve FOTO limitation score to 55% or less by 4 weeks   3. Pt edema will decrease by .5 cm or greater by 4 weeks.   4. Pt will initiate HEP by 2 weeks.     Long term  1. Pt will improve MMT of the L thumb flexion/extension to 4/5 or greater prior to discharge  2. Pt will improve FOTO limitation score to 45% or less prior to discharge.   3. Pt edema will decrease by 1 cm or greater prior to discharge.   4. Pt will report 100% compliance/independent  with HEP prior to discharge.       Plan   Continue with established Plan of Care towards Occupational Therapy goals.   Discussed Plan of Care with patient: Yes    Maldonado Lucas, OT  9/12/2022

## 2022-09-13 ENCOUNTER — TELEPHONE (OUTPATIENT)
Dept: PODIATRY | Facility: CLINIC | Age: 52
End: 2022-09-13
Payer: MEDICAID

## 2022-09-15 ENCOUNTER — OFFICE VISIT (OUTPATIENT)
Dept: PODIATRY | Facility: CLINIC | Age: 52
End: 2022-09-15
Payer: MEDICAID

## 2022-09-15 VITALS
RESPIRATION RATE: 18 BRPM | DIASTOLIC BLOOD PRESSURE: 91 MMHG | BODY MASS INDEX: 48.82 KG/M2 | HEART RATE: 64 BPM | SYSTOLIC BLOOD PRESSURE: 179 MMHG | HEIGHT: 65 IN | WEIGHT: 293 LBS

## 2022-09-15 DIAGNOSIS — B35.3 TINEA PEDIS OF BOTH FEET: ICD-10-CM

## 2022-09-15 DIAGNOSIS — L85.3 DRY SKIN: ICD-10-CM

## 2022-09-15 DIAGNOSIS — B35.1 ONYCHOMYCOSIS OF TOENAIL: ICD-10-CM

## 2022-09-15 DIAGNOSIS — R23.4 PEELING SKIN: ICD-10-CM

## 2022-09-15 DIAGNOSIS — G57.93 NEUROPATHIC PAIN OF BOTH FEET: ICD-10-CM

## 2022-09-15 DIAGNOSIS — L30.8 OTHER ECZEMA: ICD-10-CM

## 2022-09-15 PROCEDURE — 3008F PR BODY MASS INDEX (BMI) DOCUMENTED: ICD-10-PCS | Mod: CPTII,,, | Performed by: PODIATRIST

## 2022-09-15 PROCEDURE — 3077F PR MOST RECENT SYSTOLIC BLOOD PRESSURE >= 140 MM HG: ICD-10-PCS | Mod: CPTII,,, | Performed by: PODIATRIST

## 2022-09-15 PROCEDURE — 99215 OFFICE O/P EST HI 40 MIN: CPT | Mod: PBBFAC | Performed by: PODIATRIST

## 2022-09-15 PROCEDURE — 4010F PR ACE/ARB THEARPY RXD/TAKEN: ICD-10-PCS | Mod: CPTII,,, | Performed by: PODIATRIST

## 2022-09-15 PROCEDURE — 3077F SYST BP >= 140 MM HG: CPT | Mod: CPTII,,, | Performed by: PODIATRIST

## 2022-09-15 PROCEDURE — 3080F DIAST BP >= 90 MM HG: CPT | Mod: CPTII,,, | Performed by: PODIATRIST

## 2022-09-15 PROCEDURE — 3080F PR MOST RECENT DIASTOLIC BLOOD PRESSURE >= 90 MM HG: ICD-10-PCS | Mod: CPTII,,, | Performed by: PODIATRIST

## 2022-09-15 PROCEDURE — 99999 PR PBB SHADOW E&M-EST. PATIENT-LVL V: CPT | Mod: PBBFAC,,, | Performed by: PODIATRIST

## 2022-09-15 PROCEDURE — 99214 OFFICE O/P EST MOD 30 MIN: CPT | Mod: S$PBB,,, | Performed by: PODIATRIST

## 2022-09-15 PROCEDURE — 1160F RVW MEDS BY RX/DR IN RCRD: CPT | Mod: CPTII,,, | Performed by: PODIATRIST

## 2022-09-15 PROCEDURE — 99214 PR OFFICE/OUTPT VISIT, EST, LEVL IV, 30-39 MIN: ICD-10-PCS | Mod: S$PBB,,, | Performed by: PODIATRIST

## 2022-09-15 PROCEDURE — 1159F MED LIST DOCD IN RCRD: CPT | Mod: CPTII,,, | Performed by: PODIATRIST

## 2022-09-15 PROCEDURE — 1159F PR MEDICATION LIST DOCUMENTED IN MEDICAL RECORD: ICD-10-PCS | Mod: CPTII,,, | Performed by: PODIATRIST

## 2022-09-15 PROCEDURE — 4010F ACE/ARB THERAPY RXD/TAKEN: CPT | Mod: CPTII,,, | Performed by: PODIATRIST

## 2022-09-15 PROCEDURE — 1160F PR REVIEW ALL MEDS BY PRESCRIBER/CLIN PHARMACIST DOCUMENTED: ICD-10-PCS | Mod: CPTII,,, | Performed by: PODIATRIST

## 2022-09-15 PROCEDURE — 99999 PR PBB SHADOW E&M-EST. PATIENT-LVL V: ICD-10-PCS | Mod: PBBFAC,,, | Performed by: PODIATRIST

## 2022-09-15 PROCEDURE — 3008F BODY MASS INDEX DOCD: CPT | Mod: CPTII,,, | Performed by: PODIATRIST

## 2022-09-15 RX ORDER — LUBIPROSTONE 24 UG/1
24 CAPSULE ORAL
COMMUNITY
Start: 2022-08-26 | End: 2022-09-17

## 2022-09-15 RX ORDER — LACTULOSE 10 G/15ML
30 SOLUTION ORAL; RECTAL 3 TIMES DAILY
COMMUNITY
Start: 2022-08-30 | End: 2023-04-13 | Stop reason: CLARIF

## 2022-09-15 RX ORDER — CIPROFLOXACIN 500 MG/1
500 TABLET ORAL
COMMUNITY
Start: 2022-08-21 | End: 2022-09-17 | Stop reason: SDUPTHER

## 2022-09-15 RX ORDER — PHENAZOPYRIDINE HYDROCHLORIDE 100 MG/1
TABLET, FILM COATED ORAL
COMMUNITY
Start: 2022-07-01 | End: 2022-09-17

## 2022-09-15 RX ORDER — PROMETHAZINE HYDROCHLORIDE 25 MG/1
25 TABLET ORAL
COMMUNITY
Start: 2022-08-26 | End: 2022-09-17 | Stop reason: SDUPTHER

## 2022-09-15 RX ORDER — AZITHROMYCIN 250 MG/1
TABLET, FILM COATED ORAL
COMMUNITY
Start: 2022-09-02 | End: 2022-09-17

## 2022-09-15 RX ORDER — CIPROFLOXACIN 500 MG/1
TABLET ORAL
COMMUNITY
Start: 2022-08-27 | End: 2023-04-13 | Stop reason: CLARIF

## 2022-09-15 RX ORDER — LISINOPRIL 20 MG/1
TABLET ORAL
COMMUNITY
End: 2023-10-03

## 2022-09-15 RX ORDER — PIMECROLIMUS 10 MG/G
CREAM TOPICAL 2 TIMES DAILY
Qty: 100 G | Refills: 2 | Status: SHIPPED | OUTPATIENT
Start: 2022-09-15 | End: 2023-04-13 | Stop reason: CLARIF

## 2022-09-15 RX ORDER — AMOXICILLIN 500 MG/1
CAPSULE ORAL
COMMUNITY
Start: 2022-07-25 | End: 2022-09-17

## 2022-09-15 RX ORDER — LACTULOSE 10 G/15ML
20 SOLUTION ORAL; RECTAL
COMMUNITY
Start: 2022-08-26 | End: 2023-04-13 | Stop reason: CLARIF

## 2022-09-15 RX ORDER — TRETINOIN 0.25 MG/G
CREAM TOPICAL
COMMUNITY
Start: 2022-08-03 | End: 2023-04-13 | Stop reason: CLARIF

## 2022-09-15 RX ORDER — PROMETHAZINE HYDROCHLORIDE AND DEXTROMETHORPHAN HYDROBROMIDE 6.25; 15 MG/5ML; MG/5ML
5 SYRUP ORAL EVERY 6 HOURS PRN
COMMUNITY
Start: 2022-09-02 | End: 2023-04-13 | Stop reason: CLARIF

## 2022-09-15 RX ORDER — PHENAZOPYRIDINE HYDROCHLORIDE 200 MG/1
200 TABLET, FILM COATED ORAL 3 TIMES DAILY PRN
COMMUNITY
Start: 2022-07-01 | End: 2022-09-17

## 2022-09-15 RX ORDER — BETAMETHASONE DIPROPIONATE 0.5 MG/G
GEL TOPICAL
COMMUNITY
Start: 2022-07-21 | End: 2022-09-17

## 2022-09-15 RX ORDER — PROMETHAZINE HYDROCHLORIDE 25 MG/1
TABLET ORAL
COMMUNITY
Start: 2022-08-26 | End: 2023-10-03

## 2022-09-15 RX ORDER — METHYLPREDNISOLONE 4 MG/1
TABLET ORAL
COMMUNITY
Start: 2022-09-02 | End: 2023-04-13 | Stop reason: CLARIF

## 2022-09-17 NOTE — PROGRESS NOTES
Subjective:       Patient ID: Nely Salas is a 52 y.o. female.    Chief Complaint: Follow-up, Nail Problem, Skin Problem, Diabetes Mellitus, and Foot Pain  Patient presents for follow-up  athlete's foot, chronic dry peeling skin, type 2 diabetes, neuropathic pain.   Patient feels she had allergic reaction to clotrimazole / betamethasone which she states made her feet red and sore, has discontinued use.  Relates increased burning pain in feet, has diabetic neuropathy,  taking gabapentin 800 mg 3 times daily.  Pain level feet 10/10  Reports last A1c was 7.8, glucose 138 this morning      Past Medical History:   Diagnosis Date    Anemia, unspecified     Diabetes mellitus, type 2     Diabetic neuropathy     GERD (gastroesophageal reflux disease)     Hypertension     Sleep apnea      Past Surgical History:   Procedure Laterality Date     SECTION      CHOLECYSTECTOMY      FALLOPIAN TUBOPLASTY      TRIGGER FINGER RELEASE Left 2022    Procedure: TRIGGER FINGER RELEASE LEFT THUMB;  Surgeon: Wiliam Katz DO;  Location: [facility] OR;  Service: Orthopedics;  Laterality: Left;     History reviewed. No pertinent family history.  Social History     Socioeconomic History    Marital status:    Tobacco Use    Smoking status: Never    Smokeless tobacco: Never   Substance and Sexual Activity    Alcohol use: Yes     Comment: social    Drug use: Never    Sexual activity: Yes     Partners: Male       Current Outpatient Medications   Medication Sig Dispense Refill    cetirizine (ZYRTEC) 10 MG tablet Take 10 mg by mouth once daily.      ciprofloxacin HCl (CIPRO) 500 MG tablet TAKE ONE TABLET BY MOUTH EVERY MORNING AND EVERY NIGHT AT BEDTIME FOR SEVEN DAYS.      EASY TOUCH TWIST LANCETS 30 gauge Misc MONITOR BLOOD GLUCOSE DAILY.      empagliflozin (JARDIANCE) 10 mg tablet Take 10 mg by mouth.      esomeprazole (NEXIUM) 20 MG capsule Take 20 mg by mouth before breakfast.      gabapentin (NEURONTIN) 800 MG tablet        lactulose (CHRONULAC) 10 gram/15 mL solution 20 g.      lactulose (CHRONULAC) 10 gram/15 mL solution Take 30 mLs by mouth 3 (three) times daily.      lisinopriL (PRINIVIL,ZESTRIL) 20 MG tablet Take by mouth.      methylPREDNISolone (MEDROL DOSEPACK) 4 mg tablet Take by mouth.      oxyCODONE (ROXICODONE) 15 MG Tab Take 15 mg by mouth every 4 (four) hours as needed.      promethazine (PHENERGAN) 25 MG tablet TAKE ONE TABLET BY MOUTH EVERY NIGHT AT BEDTIME AS NEEDED FOR NAUSEA AND VOMITING      promethazine-dextromethorphan (PROMETHAZINE-DM) 6.25-15 mg/5 mL Syrp Take 5 mLs by mouth every 6 (six) hours as needed.      traZODone (DESYREL) 100 MG tablet Take 200 mg by mouth nightly as needed.      tretinoin (RETIN-A) 0.025 % cream APPLY TO AFFECTED AREA OF SKIN EVERY NIGHT AT BEDTIME AS DIRECTED      zolpidem (AMBIEN) 10 mg Tab Take 10 mg by mouth nightly as needed.      naloxone (NARCAN) 4 mg/actuation Spry       pimecrolimus (ELIDEL) 1 % cream Apply topically 2 (two) times daily. 100 g 2     Current Facility-Administered Medications   Medication Dose Route Frequency Provider Last Rate Last Admin    iron dextran (INFED) 200 mg in sodium chloride 0.9% 250 mL IVPB  200 mg Intravenous Q28 Days Germán Velasquez MD         Review of patient's allergies indicates:   Allergen Reactions    Bactrim [sulfamethoxazole-trimethoprim] Nausea And Vomiting    Zithromax [azithromycin] Nausea And Vomiting    Clotrimazole-betamethasone Other (See Comments)     Makes patient's feet red and cracks the skin more    Ketorolac Other (See Comments)     PT STATES IT DOES NOT WORK FOR HER    Morphine Hives    Potassium clavulanate      Other reaction(s): Unknown    Sulfamethoxazole Other (See Comments)    Trimethoprim Other (See Comments)    Amoxicillin trihydrate Nausea Only and Other (See Comments)    Sulfa (sulfonamide antibiotics) Nausea And Vomiting       Review of Systems   HENT:  Negative for congestion.    Respiratory:  Negative for cough  "and shortness of breath.    Cardiovascular:  Positive for leg swelling.   Musculoskeletal:  Positive for gait problem.   All other systems reviewed and are negative.    Objective:      Vitals:    09/15/22 1044   BP: (!) 179/91   Pulse: 64   Resp: 18   Weight: 133.9 kg (295 lb 3.2 oz)   Height: 5' 5" (1.651 m)     Physical Exam  Vitals and nursing note reviewed.   Constitutional:       General: She is not in acute distress.  Cardiovascular:      Pulses:           Dorsalis pedis pulses are 2+ on the right side and 2+ on the left side.        Posterior tibial pulses are 2+ on the right side and 2+ on the left side.   Pulmonary:      Effort: Pulmonary effort is normal.   Musculoskeletal:      Right foot: Decreased range of motion (limted AJ DF b/l).      Left foot: Decreased range of motion.   Feet:      Right foot:      Skin integrity: Dry skin present. No skin breakdown or erythema.      Toenail Condition: Right toenails are abnormally thick. Fungal disease present.     Left foot:      Skin integrity: Dry skin (severe dry skin, thickened, healthy skin underneath) present. No skin breakdown or erythema.      Toenail Condition: Left toenails are abnormally thick. Fungal disease present.  Skin:     General: Skin is dry.      Capillary Refill: Capillary refill takes less than 2 seconds.      Comments: Dry, peeling skin plantar feet secondary to chronic tinea pedis which is improved   Neurological:      General: No focal deficit present.      Comments: Painful paresthesias feet, painful diabetic neuropathy   Psychiatric:         Mood and Affect: Mood normal.         Behavior: Behavior normal.                                                Assessment:       1. DM type 2, uncontrolled, with neuropathy    2. Neuropathic pain of both feet    3. Tinea pedis of both feet    4. Other eczema    5. Peeling skin    6. Dry skin    7. Onychomycosis of toenail          Plan:          ELIDEL APPLY ONCE DAILY TO FEET      Advised patient " condition of skin is worse, some topical medication needs to be applied daily she feels previous prescription is causing side effects.  Advised patient it is unlikely betamethasone would cause side effects as this topical steroid stiff pick [place] effective in reducing pain and inflammation.  However we did discuss  possible eczema and treating this condition of skin within on steroid cream.  Applied a Elidel and instructed to apply any lotion or moisturizer in the morning, preferably oil based, coconut oil, tea tree we will based cream or lotion then apply heavy amount of Elidel before bed at night  Instructed on need for daily soaking, reviewed at length  Instructed patient to see dermatology to r/o  psoriasis.   Patient verbalized understanding  Reviewed diabetic education   Reviewed neuropathy, increased pain in feet and advised patient she needs disc discussed change in medication with prescriber of gabapentin  Reviewed appropriate shoes especially indoors  Reviewed maintenance of nails. Thickness of nails reduced.  Instructed patient on topical treatments  Reviewed need for daily foot checks and instructed patient to contact the office with any area of redness or swelling which has not improved within 3 days.  Patient/family were in understanding and agreement with treatment plan.  I counseled the patient on their conditions, implications and medical management.  Instructed patient/family to contact the office with any changes, questions, concerns, worsening of symptoms.   Total face to face time, exam, assessment, treatment, discussion, documentation 30 minutes, more than half this time spent on consultation and coordination of care.   Follow up 3 months    This note was created using [employer] voice recognition software that occasionally misinterpreted phrases or words.

## 2022-10-05 ENCOUNTER — TELEPHONE (OUTPATIENT)
Dept: ORTHOPEDICS | Facility: CLINIC | Age: 52
End: 2022-10-05
Payer: MEDICAID

## 2022-10-05 NOTE — TELEPHONE ENCOUNTER
Returned call. I stated the office had a cancellation earlier the morning of her appointment, but it had since booked. Patient confirmed her appointment on 10/11/22.    ----- Message from Rea Davenport sent at 10/5/2022  1:29 PM CDT -----  Contact: pt [phone]  Patient stated that they missed a call from [facility].    Please call and advise.    Thank You

## 2022-10-11 ENCOUNTER — OFFICE VISIT (OUTPATIENT)
Dept: ORTHOPEDICS | Facility: CLINIC | Age: 52
End: 2022-10-11
Payer: MEDICAID

## 2022-10-11 ENCOUNTER — TELEPHONE (OUTPATIENT)
Dept: ORTHOPEDICS | Facility: CLINIC | Age: 52
End: 2022-10-11
Payer: MEDICAID

## 2022-10-11 ENCOUNTER — CLINICAL SUPPORT (OUTPATIENT)
Dept: REHABILITATION | Facility: HOSPITAL | Age: 52
End: 2022-10-11
Payer: MEDICAID

## 2022-10-11 VITALS — HEIGHT: 65 IN | BODY MASS INDEX: 48.82 KG/M2 | RESPIRATION RATE: 14 BRPM | WEIGHT: 293 LBS

## 2022-10-11 DIAGNOSIS — S43.432A SUPERIOR LABRUM ANTERIOR-TO-POSTERIOR (SLAP) TEAR OF LEFT SHOULDER: ICD-10-CM

## 2022-10-11 DIAGNOSIS — S46.012D TRAUMATIC INCOMPLETE TEAR OF LEFT ROTATOR CUFF, SUBSEQUENT ENCOUNTER: Primary | ICD-10-CM

## 2022-10-11 DIAGNOSIS — Z01.818 PREOP TESTING: ICD-10-CM

## 2022-10-11 DIAGNOSIS — M65.312 TRIGGER FINGER OF LEFT THUMB: Primary | ICD-10-CM

## 2022-10-11 DIAGNOSIS — G89.29 CHRONIC LEFT SHOULDER PAIN: ICD-10-CM

## 2022-10-11 DIAGNOSIS — M75.22 TENDONITIS OF UPPER BICEPS TENDON OF LEFT SHOULDER: ICD-10-CM

## 2022-10-11 DIAGNOSIS — M75.41 IMPINGEMENT SYNDROME OF BOTH SHOULDERS: ICD-10-CM

## 2022-10-11 DIAGNOSIS — M19.012 ARTHRITIS OF LEFT ACROMIOCLAVICULAR JOINT: ICD-10-CM

## 2022-10-11 DIAGNOSIS — M75.42 IMPINGEMENT SYNDROME OF BOTH SHOULDERS: ICD-10-CM

## 2022-10-11 DIAGNOSIS — M25.512 CHRONIC LEFT SHOULDER PAIN: ICD-10-CM

## 2022-10-11 PROCEDURE — 97110 THERAPEUTIC EXERCISES: CPT | Mod: PN

## 2022-10-11 PROCEDURE — 4010F ACE/ARB THERAPY RXD/TAKEN: CPT | Mod: CPTII,,, | Performed by: ORTHOPAEDIC SURGERY

## 2022-10-11 PROCEDURE — 97140 MANUAL THERAPY 1/> REGIONS: CPT | Mod: PN

## 2022-10-11 PROCEDURE — 99999 PR PBB SHADOW E&M-EST. PATIENT-LVL III: ICD-10-PCS | Mod: PBBFAC,,, | Performed by: ORTHOPAEDIC SURGERY

## 2022-10-11 PROCEDURE — 4010F PR ACE/ARB THEARPY RXD/TAKEN: ICD-10-PCS | Mod: CPTII,,, | Performed by: ORTHOPAEDIC SURGERY

## 2022-10-11 PROCEDURE — 1159F MED LIST DOCD IN RCRD: CPT | Mod: CPTII,,, | Performed by: ORTHOPAEDIC SURGERY

## 2022-10-11 PROCEDURE — 99214 OFFICE O/P EST MOD 30 MIN: CPT | Mod: 57,S$PBB,, | Performed by: ORTHOPAEDIC SURGERY

## 2022-10-11 PROCEDURE — 97014 ELECTRIC STIMULATION THERAPY: CPT | Mod: PN

## 2022-10-11 PROCEDURE — 99214 PR OFFICE/OUTPT VISIT, EST, LEVL IV, 30-39 MIN: ICD-10-PCS | Mod: 57,S$PBB,, | Performed by: ORTHOPAEDIC SURGERY

## 2022-10-11 PROCEDURE — 99213 OFFICE O/P EST LOW 20 MIN: CPT | Mod: PBBFAC,PN | Performed by: ORTHOPAEDIC SURGERY

## 2022-10-11 PROCEDURE — 3008F BODY MASS INDEX DOCD: CPT | Mod: CPTII,,, | Performed by: ORTHOPAEDIC SURGERY

## 2022-10-11 PROCEDURE — 3008F PR BODY MASS INDEX (BMI) DOCUMENTED: ICD-10-PCS | Mod: CPTII,,, | Performed by: ORTHOPAEDIC SURGERY

## 2022-10-11 PROCEDURE — 1159F PR MEDICATION LIST DOCUMENTED IN MEDICAL RECORD: ICD-10-PCS | Mod: CPTII,,, | Performed by: ORTHOPAEDIC SURGERY

## 2022-10-11 PROCEDURE — 99999 PR PBB SHADOW E&M-EST. PATIENT-LVL III: CPT | Mod: PBBFAC,,, | Performed by: ORTHOPAEDIC SURGERY

## 2022-10-11 NOTE — PROGRESS NOTES
"  Chief Complaint:  Triggering left thumb     HPI:  Ms. Salas is a 52-year-old right-hand-dominant lady who returns today for re-evaluation of her left shoulder.  She has had 1 year of bilateral shoulder pain after she was involved in a little [employer]" last year.  Circumduction, forward flexion, and abduction increases her symptoms.   Her symptoms awaken her at night.  She has taken NSAIDs with help.  She has not worn a brace.  She has had steroid injections which have not resolved her symptoms.             Past Medical History:   Diagnosis Date     *  *  *  *  *  *  * Hypertension  Seasonal allergies  Anxiety  GERD  Headaches  Sickle cell trait  Fibromyalgia  Chronic pain management                  Past Surgical History:   Procedure Laterality Date    CHOLECYSTECTOMY         *  *  *  * FALLOPIAN TUBOPLASTY    EGD  A1 PULLEY RELEASE LEFT THUMB                     Review of patient's allergies indicates:   Allergen Reactions    Bactrim [sulfamethoxazole-trimethoprim] Nausea And Vomiting    Zithromax [azithromycin] Nausea And Vomiting         Social History                Occupational History    UNEMPLOYED   Tobacco Use    Smoking status: Never Smoker    Smokeless tobacco: Never Used   Substance and Sexual Activity    Alcohol use: Yes       Frequency: Monthly or less    Drug use: Never    Sexual activity: Yes       Partners: Male      Family history:  Father:  Alive, stroke.  Mother:  Alive, hypothyroidism/hypertension.  Brother:  6, alive, stroke/diabetes.  Sister:  6, 1 , murder.  Son:  3, alive, denied medical problems.  Daughter:  2, alive, denied medical problems.     Previous Hospitalizations:  Childbirth.     ROS: No new diagnosis/surgery/prescriptions since last office visit on 2022  Constitution: Negative for chills and fever.   HENT: Negative for congestion.    Eyes: Negative for blurred vision.   Cardiovascular: Negative for chest pain.   Respiratory: Negative for cough.  "   Endocrine: Negative for polydipsia.   Hematologic/Lymphatic: Negative for adenopathy.   Skin: Negative for flushing and itching.   Musculoskeletal: Positive for back pain and joint pain. Negative for gout.   Gastrointestinal: Positive for heartburn. Negative for constipation and diarrhea.   Genitourinary: Negative for nocturia.   Neurological: Positive for headaches. Negative for seizures.   Psychiatric/Behavioral: Negative for depression and substance abuse. The patient is nervous/anxious.    Allergic/Immunologic: Positive for environmental allergies.             Objective:   Physical Exam:   General: AAOx3.  No acute distress  HEENT: Normocephalic, PEARLA EOMI, edentulous  Neck: Supple, No JVD  Chest: Symetric, equal excursion on inspiration  Abdomen: Soft NTND  Vascular:  Pulses intact and equal bilaterally.  Capillary refill less than 3 seconds and equal bilaterally  Neurologic:  Pinprick and soft touch intact and equal bilaterally.    Integment:  No ecchymosis, no errythema  Extremity:  Shoulder:  Forward flexion/abduction equal bilaterally 0/170 degrees.  Internal rotation equal bilaterally L1.  Negative drop-arm both shoulders.  Negative lift-off both shoulders.  Full can negative both shoulders.  Empty can negative both shoulders.  Benjamin/Neer positive primarily left shoulder with some mild right shoulder.  Cross-arm negative both shoulders.  Nontender over the AC joint bilaterally.  Nontender in the bicipital groove bilaterally.  Yergason's negative bilaterally.  Apprehension/relocation negative bilaterally.  Radiography:  Personally reviewed MRI of the left shoulder completed at [facility] through [facility] in [facility] on 08/18/2022 showed a SLAP lesion, AC arthritis, biceps thinning and a partial-thickness rotator cuff tear.  Previous x-rays of both shoulders completed on 06/28/2022 showed advanced AC arthritis.      Assessment:       Impression:       1. Partial-thickness rotator  cuff tear, left shoulder   2. Slap lesion, left shoulder   3.   4.   5.   6.   Biceps tendonitis, left shoulder  AC arthritis, left shoulder   Impingement, left shoulder.    Left shoulder pain          Plan:       1.  Discussed physical examination and radiographic findings with the patient. Nely understands that she has partial-thickness rotator cuff tear, biceps tendinitis, slap lesion, and AC arthritis of her left shoulder.  Treatment alternatives and outcomes were discussed with the patient she understands she could continue to be treated conservatively with observation, activity modification, NSAIDs, bracing, physical/occupational therapy, injections, or she could consider surgical intervention such arthroscopy with rotator cuff repair.  She has failed conservative management and would like to proceed with surgical intervention on her shoulder.  2. Possible complications of surgery to include bleeding, infection, scarring, nerve/blood vessel/tendon damage, need for further surgery, failed surgery, failure to improve, possible persistent pain, possible stiffness, possible arthritis, and possible recurrence were discussed with the patient.  The patient was permitted to ask questions and all concerns were addressed to her satisfaction.    3. Consent for arthroscopy of the left shoulder with a possible open rotator cuff repair, biceps tenodesis, distal clavicle resection, and subacromial decompression.  4. Tentatively schedule surgery for 11/0 7/2022  5. All postoperative meds will be prescribed on the date of surgery she should discuss with her PCM and Pain Management position postoperative pain management.  6. Continue with NSAIDs as tolerated allowed by PCM.  7. Continue to do home exercises as previously shown discussed.    8. Follow-up approximately 10-12 days postoperatively

## 2022-10-11 NOTE — PROGRESS NOTES
Occupational Therapy Discharge Summary    Name: Nely Salas 1970  MRN: [id number]   Date: 10/11/2022  Principal Problem: [unfilled]     Subjective:  Patient Discharged from Outpatient Occupational Therapy on 10/11/2022.  Please refer to prior OT noted date on 9/12/2022 for functional status.    Objective:  GOALS:      Assessment:  Patient has met all goals and is not appropriate for therapy.    Reasons for Discontinuation of Therapy Services  Patient has achieved all OT goals.      Plan:  Patient Discharged to: Home with no OT.    Maldonado Lucas, OT

## 2022-10-11 NOTE — H&P
"Chief Complaint:  Triggering left thumb     HPI:  Ms. Salas is a 52-year-old right-hand-dominant lady who returns today for re-evaluation of her left shoulder.  She has had 1 year of bilateral shoulder pain after she was involved in a little [employer]" last year.  Circumduction, forward flexion, and abduction increases her symptoms.   Her symptoms awaken her at night.  She has taken NSAIDs with help.  She has not worn a brace.  She has had steroid injections which have not resolved her symptoms.             Past Medical History:   Diagnosis Date     *  *  *  *  *  *  * Hypertension  Seasonal allergies  Anxiety  GERD  Headaches  Sickle cell trait  Fibromyalgia  Chronic pain management                  Past Surgical History:   Procedure Laterality Date    CHOLECYSTECTOMY         *  *  *  * FALLOPIAN TUBOPLASTY    EGD  A1 PULLEY RELEASE LEFT THUMB                     Review of patient's allergies indicates:   Allergen Reactions    Bactrim [sulfamethoxazole-trimethoprim] Nausea And Vomiting    Zithromax [azithromycin] Nausea And Vomiting         Social History                Occupational History    UNEMPLOYED   Tobacco Use    Smoking status: Never Smoker    Smokeless tobacco: Never Used   Substance and Sexual Activity    Alcohol use: Yes       Frequency: Monthly or less    Drug use: Never    Sexual activity: Yes       Partners: Male      Family history:  Father:  Alive, stroke.  Mother:  Alive, hypothyroidism/hypertension.  Brother:  6, alive, stroke/diabetes.  Sister:  6, 1 , murder.  Son:  3, alive, denied medical problems.  Daughter:  2, alive, denied medical problems.     Previous Hospitalizations:  Childbirth.     ROS: No new diagnosis/surgery/prescriptions since last office visit on 2022  Constitution: Negative for chills and fever.   HENT: Negative for congestion.    Eyes: Negative for blurred vision.   Cardiovascular: Negative for chest pain.   Respiratory: Negative for cough.  "   Endocrine: Negative for polydipsia.   Hematologic/Lymphatic: Negative for adenopathy.   Skin: Negative for flushing and itching.   Musculoskeletal: Positive for back pain and joint pain. Negative for gout.   Gastrointestinal: Positive for heartburn. Negative for constipation and diarrhea.   Genitourinary: Negative for nocturia.   Neurological: Positive for headaches. Negative for seizures.   Psychiatric/Behavioral: Negative for depression and substance abuse. The patient is nervous/anxious.    Allergic/Immunologic: Positive for environmental allergies.             Objective:   Physical Exam:   General: AAOx3.  No acute distress  HEENT: Normocephalic, PEARLA EOMI, edentulous  Neck: Supple, No JVD  Chest: Symetric, equal excursion on inspiration  Abdomen: Soft NTND  Vascular:  Pulses intact and equal bilaterally.  Capillary refill less than 3 seconds and equal bilaterally  Neurologic:  Pinprick and soft touch intact and equal bilaterally.    Integment:  No ecchymosis, no errythema  Extremity:  Shoulder:  Forward flexion/abduction equal bilaterally 0/170 degrees.  Internal rotation equal bilaterally L1.  Negative drop-arm both shoulders.  Negative lift-off both shoulders.  Full can negative both shoulders.  Empty can negative both shoulders.  Benjamin/Neer positive primarily left shoulder with some mild right shoulder.  Cross-arm negative both shoulders.  Nontender over the AC joint bilaterally.  Nontender in the bicipital groove bilaterally.  Yergason's negative bilaterally.  Apprehension/relocation negative bilaterally.  Radiography:  Personally reviewed MRI of the left shoulder completed at [facility] through [facility] in [facility] on 08/18/2022 showed a SLAP lesion, AC arthritis, biceps thinning and a partial-thickness rotator cuff tear.  Previous x-rays of both shoulders completed on 06/28/2022 showed advanced AC arthritis.      Assessment:       Impression:       1. Partial-thickness rotator  cuff tear, left shoulder   2. Slap lesion, left shoulder   3.   4.   5.   6.   Biceps tendonitis, left shoulder  AC arthritis, left shoulder   Impingement, left shoulder.    Left shoulder pain          Plan:       1.  Discussed physical examination and radiographic findings with the patient. Nely understands that she has partial-thickness rotator cuff tear, biceps tendinitis, slap lesion, and AC arthritis of her left shoulder.  Treatment alternatives and outcomes were discussed with the patient she understands she could continue to be treated conservatively with observation, activity modification, NSAIDs, bracing, physical/occupational therapy, injections, or she could consider surgical intervention such arthroscopy with rotator cuff repair.  She has failed conservative management and would like to proceed with surgical intervention on her shoulder.  2. Possible complications of surgery to include bleeding, infection, scarring, nerve/blood vessel/tendon damage, need for further surgery, failed surgery, failure to improve, possible persistent pain, possible stiffness, possible arthritis, and possible recurrence were discussed with the patient.  The patient was permitted to ask questions and all concerns were addressed to her satisfaction.    3. Consent for arthroscopy of the left shoulder with a possible open rotator cuff repair, biceps tenodesis, distal clavicle resection, and subacromial decompression.  4. Tentatively schedule surgery for 11/0 7/2022  5. All postoperative meds will be prescribed on the date of surgery she should discuss with her PCM and Pain Management position postoperative pain management.  6. Continue with NSAIDs as tolerated allowed by PCM.  7. Continue to do home exercises as previously shown discussed.    8. Follow-up approximately 10-12 days postoperatively

## 2022-10-11 NOTE — TELEPHONE ENCOUNTER
----- Message from Tish Casey sent at 10/11/2022  2:05 PM CDT -----  Type: Call Back         Who called: Chanda with [facility]          What is the request in detail: Regarding requesting orders for the Pt medical records fax to [phone] . A request was send on 09/28/22         Can the clinic reply by [facility]? No          Would the patient rather a call back or a response via [facility]? Call back          Best call back number:[phone]         Additional Information:           Thank You

## 2022-10-24 ENCOUNTER — ANESTHESIA EVENT (OUTPATIENT)
Dept: SURGERY | Facility: HOSPITAL | Age: 52
End: 2022-10-24
Payer: MEDICAID

## 2022-10-24 ENCOUNTER — HOSPITAL ENCOUNTER (OUTPATIENT)
Dept: PREADMISSION TESTING | Facility: HOSPITAL | Age: 52
Discharge: HOME | End: 2022-10-24
Attending: ORTHOPAEDIC SURGERY
Payer: MEDICAID

## 2022-10-24 PROCEDURE — 99900103 DSU ONLY-NO CHARGE-INITIAL HR (STAT)

## 2022-10-24 NOTE — ANESTHESIA PREPROCEDURE EVALUATION
10/24/2022  Nely Salas is a 52 y.o., female.      Pre-op Assessment    I have reviewed the Patient Summary Reports.     I have reviewed the Nursing Notes. I have reviewed the NPO Status.   I have reviewed the Medications.     Review of Systems  Anesthesia Hx:  No problems with previous Anesthesia  Neg history of prior surgery. Denies Family Hx of Anesthesia complications.   Denies Personal Hx of Anesthesia complications.   Social:  Non-Smoker    Hematology/Oncology:  Hematology Normal   Oncology Normal     EENT/Dental:EENT/Dental Normal   Cardiovascular:   Hypertension, well controlled    Pulmonary:   Sleep Apnea, CPAP    Education provided regarding risk of obstructive sleep apnea     Renal/:  Renal/ Normal     Hepatic/GI:   GERD, poorly controlled    Musculoskeletal:  Musculoskeletal Normal Arthritis      Neurological:   Neuromuscular Disease,   Chronic Pain: Chronic oxycodone 15mg 3X/day.  Peripheral Neuropathy    Endocrine:   Diabetes  Morbid Obesity / BMI > 40  Dermatological:  Skin Normal    Psych:  Psychiatric Normal           Physical Exam  General: Alert    Airway:  Mallampati: III   Mouth Opening: Normal  TM Distance: Normal  Neck ROM: Normal ROM    Dental:  Dentures    Chest/Lungs:  Clear to auscultation, Normal Respiratory Rate    Heart:  Rate: Normal    Abdomen:  Normal        Anesthesia Plan  Type of Anesthesia, risks & benefits discussed:    Anesthesia Type: Gen ETT, Regional  Post Op Pain Control Plan: multimodal analgesia and peripheral nerve block  Airway Plan: Direct, Post-Induction  Informed Consent: Informed consent signed with the Patient and all parties understand the risks and agree with anesthesia plan.  All questions answered. Patient consented to blood products? No  ASA Score: 3  Day of Surgery Review of History & Physical: H&P Update referred to the  surgeon/provider.    Ready For Surgery From Anesthesia Perspective.     .

## 2022-11-02 ENCOUNTER — TELEPHONE (OUTPATIENT)
Dept: ORTHOPEDICS | Facility: CLINIC | Age: 52
End: 2022-11-02
Payer: MEDICAID

## 2022-11-02 NOTE — TELEPHONE ENCOUNTER
Returned call. Patient stated she needed to postpone surgery that is scheduled for 11/7/22. Patient wanted to keep her follow up appointment to reschedule her surgery for a later date. I stated understanding.    ----- Message from Ion Sanchez sent at 11/2/2022 12:30 PM CDT -----  Regarding: needs to re armond Sx call pt   Contact: pt   needs to re armond Sx call pt

## 2022-11-07 ENCOUNTER — ANESTHESIA (OUTPATIENT)
Dept: SURGERY | Facility: HOSPITAL | Age: 52
End: 2022-11-07
Payer: MEDICAID

## 2022-11-18 ENCOUNTER — OFFICE VISIT (OUTPATIENT)
Dept: ORTHOPEDICS | Facility: CLINIC | Age: 52
End: 2022-11-18
Payer: MEDICAID

## 2022-11-18 VITALS — BODY MASS INDEX: 48.82 KG/M2 | HEIGHT: 65 IN | WEIGHT: 293 LBS | RESPIRATION RATE: 16 BRPM

## 2022-11-18 DIAGNOSIS — Z01.818 PREOP TESTING: ICD-10-CM

## 2022-11-18 DIAGNOSIS — M25.512 CHRONIC LEFT SHOULDER PAIN: ICD-10-CM

## 2022-11-18 DIAGNOSIS — M75.22 TENDONITIS OF UPPER BICEPS TENDON OF LEFT SHOULDER: ICD-10-CM

## 2022-11-18 DIAGNOSIS — M75.42 IMPINGEMENT SYNDROME OF BOTH SHOULDERS: ICD-10-CM

## 2022-11-18 DIAGNOSIS — S46.012D TRAUMATIC INCOMPLETE TEAR OF LEFT ROTATOR CUFF, SUBSEQUENT ENCOUNTER: Primary | ICD-10-CM

## 2022-11-18 DIAGNOSIS — M19.012 ARTHRITIS OF LEFT ACROMIOCLAVICULAR JOINT: ICD-10-CM

## 2022-11-18 DIAGNOSIS — G89.29 CHRONIC LEFT SHOULDER PAIN: ICD-10-CM

## 2022-11-18 DIAGNOSIS — S43.432A SUPERIOR LABRUM ANTERIOR-TO-POSTERIOR (SLAP) TEAR OF LEFT SHOULDER: ICD-10-CM

## 2022-11-18 DIAGNOSIS — M75.41 IMPINGEMENT SYNDROME OF BOTH SHOULDERS: ICD-10-CM

## 2022-11-18 PROCEDURE — 99999 PR PBB SHADOW E&M-EST. PATIENT-LVL III: ICD-10-PCS | Mod: PBBFAC,,, | Performed by: ORTHOPAEDIC SURGERY

## 2022-11-18 PROCEDURE — 99499 NO LOS: ICD-10-PCS | Mod: S$PBB,,, | Performed by: ORTHOPAEDIC SURGERY

## 2022-11-18 PROCEDURE — 4010F ACE/ARB THERAPY RXD/TAKEN: CPT | Mod: CPTII,,, | Performed by: ORTHOPAEDIC SURGERY

## 2022-11-18 PROCEDURE — 99499 UNLISTED E&M SERVICE: CPT | Mod: S$PBB,,, | Performed by: ORTHOPAEDIC SURGERY

## 2022-11-18 PROCEDURE — 1159F PR MEDICATION LIST DOCUMENTED IN MEDICAL RECORD: ICD-10-PCS | Mod: CPTII,,, | Performed by: ORTHOPAEDIC SURGERY

## 2022-11-18 PROCEDURE — 99213 OFFICE O/P EST LOW 20 MIN: CPT | Mod: PBBFAC,PN | Performed by: ORTHOPAEDIC SURGERY

## 2022-11-18 PROCEDURE — 4010F PR ACE/ARB THEARPY RXD/TAKEN: ICD-10-PCS | Mod: CPTII,,, | Performed by: ORTHOPAEDIC SURGERY

## 2022-11-18 PROCEDURE — 3008F PR BODY MASS INDEX (BMI) DOCUMENTED: ICD-10-PCS | Mod: CPTII,,, | Performed by: ORTHOPAEDIC SURGERY

## 2022-11-18 PROCEDURE — 3008F BODY MASS INDEX DOCD: CPT | Mod: CPTII,,, | Performed by: ORTHOPAEDIC SURGERY

## 2022-11-18 PROCEDURE — 99999 PR PBB SHADOW E&M-EST. PATIENT-LVL III: CPT | Mod: PBBFAC,,, | Performed by: ORTHOPAEDIC SURGERY

## 2022-11-18 PROCEDURE — 1159F MED LIST DOCD IN RCRD: CPT | Mod: CPTII,,, | Performed by: ORTHOPAEDIC SURGERY

## 2022-11-18 NOTE — H&P
"Chief Complaint:  Left shoulder pain     HPI:  Ms. Salas is a 52-year-old right-hand-dominant lady who returned today to reschedule surgery on her left shoulder.  She was scheduled for surgery on her left shoulder for 2022 but had to postpone her surgery due to abdominal issues.  She has had 1 year of bilateral shoulder pain after she was involved in a [employer]" last year.  Circumduction, forward flexion, and abduction increases her symptoms.   Her symptoms awaken her at night.  She has taken NSAIDs with help.  She has not worn a brace.  She has had steroid injections which have not resolved her symptoms.             Past Medical History:   Diagnosis Date     *  *  *  *  *  *  * Hypertension  Seasonal allergies  Anxiety  GERD  Headaches  Sickle cell trait  Fibromyalgia  Chronic pain management                  Past Surgical History:   Procedure Laterality Date    CHOLECYSTECTOMY         *  *  *  * FALLOPIAN TUBOPLASTY    EGD  A1 PULLEY RELEASE LEFT THUMB                     Review of patient's allergies indicates:   Allergen Reactions    Bactrim [sulfamethoxazole-trimethoprim] Nausea And Vomiting    Zithromax [azithromycin] Nausea And Vomiting         Social History                Occupational History    UNEMPLOYED   Tobacco Use    Smoking status: Never Smoker    Smokeless tobacco: Never Used   Substance and Sexual Activity    Alcohol use: Yes       Frequency: Monthly or less    Drug use: Never    Sexual activity: Yes       Partners: Male      Family history:  Father:  Alive, stroke.  Mother:  Alive, hypothyroidism/hypertension.  Brother:  6, alive, stroke/diabetes.  Sister:  6, 1 , murder.  Son:  3, alive, denied medical problems.  Daughter:  2, alive, denied medical problems.     Previous Hospitalizations:  Childbirth.     ROS: New diagnosis/surgery/prescriptions since last office visit on 10/11/2022: Abdominal issues.  Constitution: Negative for chills and fever.   HENT: " Negative for congestion.    Eyes: Negative for blurred vision.   Cardiovascular: Negative for chest pain.   Respiratory: Negative for cough.    Endocrine: Negative for polydipsia.   Hematologic/Lymphatic: Negative for adenopathy.   Skin: Negative for flushing and itching.   Musculoskeletal: Positive for back pain and joint pain. Negative for gout.   Gastrointestinal: Positive for heartburn. Negative for constipation and diarrhea.   Genitourinary: Negative for nocturia.   Neurological: Positive for headaches. Negative for seizures.   Psychiatric/Behavioral: Negative for depression and substance abuse. The patient is nervous/anxious.    Allergic/Immunologic: Positive for environmental allergies.             Objective:   Physical Exam:   General: AAOx3.  No acute distress  HEENT: Normocephalic, PEARLA EOMI, edentulous  Neck: Supple, No JVD  Chest: Symetric, equal excursion on inspiration  Abdomen: Soft NTND  Vascular:  Pulses intact and equal bilaterally.  Capillary refill less than 3 seconds and equal bilaterally  Neurologic:  Pinprick and soft touch intact and equal bilaterally.    Integment:  No ecchymosis, no errythema  Extremity:  Shoulder:  Forward flexion/abduction equal bilaterally 0/170 degrees.  Internal rotation equal bilaterally L1.  Negative drop-arm both shoulders.  Negative lift-off both shoulders.  Full can negative both shoulders.  Empty can negative both shoulders.  Benjamin/Neer positive primarily left shoulder with some mild right shoulder.  Cross-arm negative both shoulders.  Nontender over the AC joint bilaterally.  Nontender in the bicipital groove bilaterally.  Yergason's negative bilaterally.  Apprehension/relocation negative bilaterally.  Radiography:  Personally reviewed MRI of the left shoulder completed at [facility] in [facility] on 08/18/2022 showed a SLAP lesion, AC arthritis, biceps thinning and a partial-thickness rotator cuff tear.  Previous x-rays  of both shoulders completed on 06/28/2022 showed advanced AC arthritis.      Assessment:       Impression:       1. Partial-thickness rotator cuff tear, left shoulder   2. Slap lesion, left shoulder   3.   4.   5.   6.   Biceps tendonitis, left shoulder  AC arthritis, left shoulder   Impingement, left shoulder.    Left shoulder pain          Plan:       1.  Discussed physical examination with the patient. Nely understands that she has partial-thickness rotator cuff tear, biceps tendinitis, slap lesion, and AC arthritis of her left shoulder.  Treatment alternatives and outcomes were discussed with the patient she understands she could continue to be treated conservatively with observation, activity modification, NSAIDs, bracing, physical/occupational therapy, injections, or she could consider surgical intervention such arthroscopy with rotator cuff repair.  She has failed conservative management and would like to proceed with surgical intervention on her shoulder.  2. Possible complications of surgery to include bleeding, infection, scarring, nerve/blood vessel/tendon damage, need for further surgery, failed surgery, failure to improve, possible persistent pain, possible stiffness, possible arthritis, and possible recurrence were discussed with the patient.  The patient was permitted to ask questions and all concerns were addressed to her satisfaction.    3. Consent for arthroscopy of the left shoulder with a possible open rotator cuff repair, biceps tenodesis, distal clavicle resection, and subacromial decompression.  4. Tentatively reschedule surgery for 01/05/2023  5. All postoperative meds will be prescribed on the date of surgery she should discuss with her PCM and Pain Management position postoperative pain management.  6. Continue with NSAIDs as tolerated allowed by PCM.  7. Continue to do home exercises as previously shown discussed.    8. Follow-up approximately 1 week preop

## 2022-11-18 NOTE — PROGRESS NOTES
"Chief Complaint:  Left shoulder pain     HPI:  Ms. Salas is a 52-year-old right-hand-dominant lady who returned today to reschedule surgery on her left shoulder.  She was scheduled for surgery on her left shoulder for 2022 but had to postpone her surgery due to abdominal issues.  She has had 1 year of bilateral shoulder pain after she was involved in a [employer]" last year.  Circumduction, forward flexion, and abduction increases her symptoms.   Her symptoms awaken her at night.  She has taken NSAIDs with help.  She has not worn a brace.  She has had steroid injections which have not resolved her symptoms.             Past Medical History:   Diagnosis Date     *  *  *  *  *  *  * Hypertension  Seasonal allergies  Anxiety  GERD  Headaches  Sickle cell trait  Fibromyalgia  Chronic pain management                  Past Surgical History:   Procedure Laterality Date    CHOLECYSTECTOMY         *  *  *  * FALLOPIAN TUBOPLASTY    EGD  A1 PULLEY RELEASE LEFT THUMB                     Review of patient's allergies indicates:   Allergen Reactions    Bactrim [sulfamethoxazole-trimethoprim] Nausea And Vomiting    Zithromax [azithromycin] Nausea And Vomiting         Social History                Occupational History    UNEMPLOYED   Tobacco Use    Smoking status: Never Smoker    Smokeless tobacco: Never Used   Substance and Sexual Activity    Alcohol use: Yes       Frequency: Monthly or less    Drug use: Never    Sexual activity: Yes       Partners: Male      Family history:  Father:  Alive, stroke.  Mother:  Alive, hypothyroidism/hypertension.  Brother:  6, alive, stroke/diabetes.  Sister:  6, 1 , murder.  Son:  3, alive, denied medical problems.  Daughter:  2, alive, denied medical problems.     Previous Hospitalizations:  Childbirth.     ROS: New diagnosis/surgery/prescriptions since last office visit on 10/11/2022: Abdominal issues.  Constitution: Negative for chills and fever.   HENT: " Negative for congestion.    Eyes: Negative for blurred vision.   Cardiovascular: Negative for chest pain.   Respiratory: Negative for cough.    Endocrine: Negative for polydipsia.   Hematologic/Lymphatic: Negative for adenopathy.   Skin: Negative for flushing and itching.   Musculoskeletal: Positive for back pain and joint pain. Negative for gout.   Gastrointestinal: Positive for heartburn. Negative for constipation and diarrhea.   Genitourinary: Negative for nocturia.   Neurological: Positive for headaches. Negative for seizures.   Psychiatric/Behavioral: Negative for depression and substance abuse. The patient is nervous/anxious.    Allergic/Immunologic: Positive for environmental allergies.             Objective:   Physical Exam:   General: AAOx3.  No acute distress  HEENT: Normocephalic, PEARLA EOMI, edentulous  Neck: Supple, No JVD  Chest: Symetric, equal excursion on inspiration  Abdomen: Soft NTND  Vascular:  Pulses intact and equal bilaterally.  Capillary refill less than 3 seconds and equal bilaterally  Neurologic:  Pinprick and soft touch intact and equal bilaterally.    Integment:  No ecchymosis, no errythema  Extremity:  Shoulder:  Forward flexion/abduction equal bilaterally 0/170 degrees.  Internal rotation equal bilaterally L1.  Negative drop-arm both shoulders.  Negative lift-off both shoulders.  Full can negative both shoulders.  Empty can negative both shoulders.  Benjamin/Neer positive primarily left shoulder with some mild right shoulder.  Cross-arm negative both shoulders.  Nontender over the AC joint bilaterally.  Nontender in the bicipital groove bilaterally.  Yergason's negative bilaterally.  Apprehension/relocation negative bilaterally.  Radiography:  Personally reviewed MRI of the left shoulder completed at [facility] in [facility] on 08/18/2022 showed a SLAP lesion, AC arthritis, biceps thinning and a partial-thickness rotator cuff tear.  Previous x-rays  of both shoulders completed on 06/28/2022 showed advanced AC arthritis.      Assessment:       Impression:       1. Partial-thickness rotator cuff tear, left shoulder   2. Slap lesion, left shoulder   3.   4.   5.   6.   Biceps tendonitis, left shoulder  AC arthritis, left shoulder   Impingement, left shoulder.    Left shoulder pain          Plan:       1.  Discussed physical examination with the patient. Nely understands that she has partial-thickness rotator cuff tear, biceps tendinitis, slap lesion, and AC arthritis of her left shoulder.  Treatment alternatives and outcomes were discussed with the patient she understands she could continue to be treated conservatively with observation, activity modification, NSAIDs, bracing, physical/occupational therapy, injections, or she could consider surgical intervention such arthroscopy with rotator cuff repair.  She has failed conservative management and would like to proceed with surgical intervention on her shoulder.  2. Possible complications of surgery to include bleeding, infection, scarring, nerve/blood vessel/tendon damage, need for further surgery, failed surgery, failure to improve, possible persistent pain, possible stiffness, possible arthritis, and possible recurrence were discussed with the patient.  The patient was permitted to ask questions and all concerns were addressed to her satisfaction.    3. Consent for arthroscopy of the left shoulder with a possible open rotator cuff repair, biceps tenodesis, distal clavicle resection, and subacromial decompression.  4. Tentatively reschedule surgery for 01/05/2023  5. All postoperative meds will be prescribed on the date of surgery she should discuss with her PCM and Pain Management position postoperative pain management.  6. Continue with NSAIDs as tolerated allowed by PCM.  7. Continue to do home exercises as previously shown discussed.    8. Follow-up approximately 1 week preop

## 2022-12-28 ENCOUNTER — OFFICE VISIT (OUTPATIENT)
Dept: ORTHOPEDICS | Facility: CLINIC | Age: 52
End: 2022-12-28
Payer: MEDICAID

## 2022-12-28 ENCOUNTER — HOSPITAL ENCOUNTER (OUTPATIENT)
Dept: PREADMISSION TESTING | Facility: HOSPITAL | Age: 52
Discharge: HOME | End: 2022-12-28
Attending: ORTHOPAEDIC SURGERY
Payer: MEDICAID

## 2022-12-28 VITALS — RESPIRATION RATE: 16 BRPM | WEIGHT: 293 LBS | HEIGHT: 65 IN | BODY MASS INDEX: 48.82 KG/M2

## 2022-12-28 DIAGNOSIS — M75.22 TENDONITIS OF UPPER BICEPS TENDON OF LEFT SHOULDER: ICD-10-CM

## 2022-12-28 DIAGNOSIS — G89.29 CHRONIC LEFT SHOULDER PAIN: ICD-10-CM

## 2022-12-28 DIAGNOSIS — S46.012D TRAUMATIC INCOMPLETE TEAR OF LEFT ROTATOR CUFF, SUBSEQUENT ENCOUNTER: Primary | ICD-10-CM

## 2022-12-28 DIAGNOSIS — M25.512 CHRONIC LEFT SHOULDER PAIN: ICD-10-CM

## 2022-12-28 DIAGNOSIS — S43.432A SUPERIOR LABRUM ANTERIOR-TO-POSTERIOR (SLAP) TEAR OF LEFT SHOULDER: ICD-10-CM

## 2022-12-28 DIAGNOSIS — M75.42 IMPINGEMENT SYNDROME OF BOTH SHOULDERS: ICD-10-CM

## 2022-12-28 DIAGNOSIS — M19.012 ARTHRITIS OF LEFT ACROMIOCLAVICULAR JOINT: ICD-10-CM

## 2022-12-28 DIAGNOSIS — M75.41 IMPINGEMENT SYNDROME OF BOTH SHOULDERS: ICD-10-CM

## 2022-12-28 PROCEDURE — 99999 PR PBB SHADOW E&M-EST. PATIENT-LVL III: CPT | Mod: PBBFAC,,, | Performed by: ORTHOPAEDIC SURGERY

## 2022-12-28 PROCEDURE — 1159F MED LIST DOCD IN RCRD: CPT | Mod: CPTII,,, | Performed by: ORTHOPAEDIC SURGERY

## 2022-12-28 PROCEDURE — 3008F PR BODY MASS INDEX (BMI) DOCUMENTED: ICD-10-PCS | Mod: CPTII,,, | Performed by: ORTHOPAEDIC SURGERY

## 2022-12-28 PROCEDURE — 3008F BODY MASS INDEX DOCD: CPT | Mod: CPTII,,, | Performed by: ORTHOPAEDIC SURGERY

## 2022-12-28 PROCEDURE — 4010F PR ACE/ARB THEARPY RXD/TAKEN: ICD-10-PCS | Mod: CPTII,,, | Performed by: ORTHOPAEDIC SURGERY

## 2022-12-28 PROCEDURE — 99499 UNLISTED E&M SERVICE: CPT | Mod: S$PBB,,, | Performed by: ORTHOPAEDIC SURGERY

## 2022-12-28 PROCEDURE — 99900103 DSU ONLY-NO CHARGE-INITIAL HR (STAT)

## 2022-12-28 PROCEDURE — 4010F ACE/ARB THERAPY RXD/TAKEN: CPT | Mod: CPTII,,, | Performed by: ORTHOPAEDIC SURGERY

## 2022-12-28 PROCEDURE — 99213 OFFICE O/P EST LOW 20 MIN: CPT | Mod: PBBFAC | Performed by: ORTHOPAEDIC SURGERY

## 2022-12-28 PROCEDURE — 1159F PR MEDICATION LIST DOCUMENTED IN MEDICAL RECORD: ICD-10-PCS | Mod: CPTII,,, | Performed by: ORTHOPAEDIC SURGERY

## 2022-12-28 PROCEDURE — 99999 PR PBB SHADOW E&M-EST. PATIENT-LVL III: ICD-10-PCS | Mod: PBBFAC,,, | Performed by: ORTHOPAEDIC SURGERY

## 2022-12-28 PROCEDURE — 99499 NO LOS: ICD-10-PCS | Mod: S$PBB,,, | Performed by: ORTHOPAEDIC SURGERY

## 2022-12-28 RX ORDER — CLINDAMYCIN HYDROCHLORIDE 300 MG/1
300 CAPSULE ORAL EVERY 8 HOURS
Qty: 15 CAPSULE | Refills: 0 | Status: SHIPPED | OUTPATIENT
Start: 2022-12-28 | End: 2023-04-13 | Stop reason: CLARIF

## 2022-12-28 NOTE — PROGRESS NOTES
"  Chief Complaint:  Left shoulder pain     HPI:  Ms. Salas is a 52-year-old right-hand-dominant lady who returned today with complaints of persistent pain in her left shoulder.   She was scheduled for surgery on her left shoulder for 2022 but had to postpone her surgery due to abdominal issues.  She has had 1 year of bilateral shoulder pain after she was involved in a [employer]" last year.  Circumduction, forward flexion, and abduction increases her symptoms.   Her symptoms awaken her at night.  She has taken NSAIDs with help.  She has not worn a brace.  She has had steroid injections which have not resolved her symptoms.             Past Medical History:   Diagnosis Date     *  *  *  *  *  *  * Hypertension  Seasonal allergies  Anxiety  GERD  Headaches  Sickle cell trait  Fibromyalgia  Chronic pain management                  Past Surgical History:   Procedure Laterality Date    CHOLECYSTECTOMY         *  *  *  * FALLOPIAN TUBOPLASTY    EGD  A1 PULLEY RELEASE LEFT THUMB                     Review of patient's allergies indicates:   Allergen Reactions    Bactrim [sulfamethoxazole-trimethoprim] Nausea And Vomiting    Zithromax [azithromycin] Nausea And Vomiting         Social History                Occupational History    UNEMPLOYED   Tobacco Use    Smoking status: Never Smoker    Smokeless tobacco: Never Used   Substance and Sexual Activity    Alcohol use: Yes       Frequency: Monthly or less    Drug use: Never    Sexual activity: Yes       Partners: Male      Family history:  Father:  Alive, stroke.  Mother:  Alive, hypothyroidism/hypertension.  Brother:  6, alive, stroke/diabetes.  Sister:  6, 1 , murder.  Son:  3, alive, denied medical problems.  Daughter:  2, alive, denied medical problems.     Previous Hospitalizations:  Childbirth.     ROS: No new diagnosis/surgery/prescriptions since last office visit on 2022.  Constitution: Negative for chills and fever.   HENT: " Negative for congestion.    Eyes: Negative for blurred vision.   Cardiovascular: Negative for chest pain.   Respiratory: Negative for cough.    Endocrine: Negative for polydipsia.   Hematologic/Lymphatic: Negative for adenopathy.   Skin: Negative for flushing and itching.   Musculoskeletal: Positive for back pain and joint pain. Negative for gout.   Gastrointestinal: Positive for heartburn. Negative for constipation and diarrhea.   Genitourinary: Negative for nocturia.   Neurological: Positive for headaches. Negative for seizures.   Psychiatric/Behavioral: Negative for depression and substance abuse. The patient is nervous/anxious.    Allergic/Immunologic: Positive for environmental allergies.             Objective:   Physical Exam:   General: AAOx3.  No acute distress  HEENT: Normocephalic, PEARLA EOMI, edentulous  Neck: Supple, No JVD  Chest: Symetric, equal excursion on inspiration  Abdomen: Soft NTND  Vascular:  Pulses intact and equal bilaterally.  Capillary refill less than 3 seconds and equal bilaterally  Neurologic:  Pinprick and soft touch intact and equal bilaterally.    Integment:  No ecchymosis, no errythema  Extremity:  Shoulder:  Forward flexion/abduction equal bilaterally 0/170 degrees.  Internal rotation equal bilaterally L1.  Negative drop-arm both shoulders.  Negative lift-off both shoulders.  Full can negative both shoulders.  Empty can negative both shoulders.  Benjamin/Neer positive primarily left shoulder with some mild right shoulder.  Cross-arm negative both shoulders.  Nontender over the AC joint bilaterally.  Nontender in the bicipital groove bilaterally.  Yergason's negative bilaterally.  Apprehension/relocation negative bilaterally.  Radiography:  Previous MRI of the left shoulder completed at [facility] in [facility] on 08/18/2022 showed a SLAP lesion, AC arthritis, biceps thinning and a partial-thickness rotator cuff tear.  Previous x-rays of both  shoulders completed on 06/28/2022 showed advanced AC arthritis.  Laboratory:  Sodium 142; potassium 3.9; chloride 108; CO2 26; BUN 7; creatinine 0.8; glucose 105; WBC 5.51; hemoglobin 10.5; hematocrit 35.6; platelets 247; PT 11.3; INR 1.1.      Assessment:       Impression:       1. Partial-thickness rotator cuff tear, left shoulder   2. Slap lesion, left shoulder   3.   4.   5.   6.   Biceps tendonitis, left shoulder  AC arthritis, left shoulder   Impingement, left shoulder.    Left shoulder pain          Plan:       1.  Discussed physical examination with the patient. Nely understands that she still has partial-thickness rotator cuff tear, biceps tendinitis, slap lesion, and AC arthritis of her left shoulder.  Treatment alternatives and outcomes were discussed with the patient she understands she could continue to be treated conservatively with observation, activity modification, NSAIDs, bracing, physical/occupational therapy, injections, or she could consider surgical intervention such arthroscopy with rotator cuff repair.  She has failed conservative management and would like to proceed with surgical intervention on her shoulder.  2. Possible complications of surgery to include bleeding, infection, scarring, nerve/blood vessel/tendon damage, need for further surgery, failed surgery, failure to improve, possible persistent pain, possible stiffness, possible arthritis, and possible recurrence were discussed with the patient.  The patient was permitted to ask questions and all concerns were addressed to her satisfaction.    3. Consent for arthroscopy of the left shoulder with a possible open rotator cuff repair, biceps tenodesis, distal clavicle resection, and subacromial decompression.  4. Surgery is currently scheduled for 01/05/2023  5. Cleocin 300 mg, 1 p.o. TID, dispense 15, refill 0, prescription was forwarded to the patient's pharmacy by [facility].  She understands this is for postop use.  6. Continue  with NSAIDs as tolerated allowed by PCM.  7. Continue to do home exercises as previously shown discussed.    8. The patient understands she needs to discuss with her pain management physician postoperative narcotic pain management.    9. Follow up approximately 10-12 days postoperatively

## 2022-12-28 NOTE — H&P
"Chief Complaint:  Left shoulder pain     HPI:  Ms. Salas is a 52-year-old right-hand-dominant lady who returned today with complaints of persistent pain in her left shoulder.   She was scheduled for surgery on her left shoulder for 2022 but had to postpone her surgery due to abdominal issues.  She has had 1 year of bilateral shoulder pain after she was involved in a [employer]" last year.  Circumduction, forward flexion, and abduction increases her symptoms.   Her symptoms awaken her at night.  She has taken NSAIDs with help.  She has not worn a brace.  She has had steroid injections which have not resolved her symptoms.             Past Medical History:   Diagnosis Date     *  *  *  *  *  *  * Hypertension  Seasonal allergies  Anxiety  GERD  Headaches  Sickle cell trait  Fibromyalgia  Chronic pain management                  Past Surgical History:   Procedure Laterality Date    CHOLECYSTECTOMY         *  *  *  * FALLOPIAN TUBOPLASTY    EGD  A1 PULLEY RELEASE LEFT THUMB                     Review of patient's allergies indicates:   Allergen Reactions    Bactrim [sulfamethoxazole-trimethoprim] Nausea And Vomiting    Zithromax [azithromycin] Nausea And Vomiting         Social History                Occupational History    UNEMPLOYED   Tobacco Use    Smoking status: Never Smoker    Smokeless tobacco: Never Used   Substance and Sexual Activity    Alcohol use: Yes       Frequency: Monthly or less    Drug use: Never    Sexual activity: Yes       Partners: Male      Family history:  Father:  Alive, stroke.  Mother:  Alive, hypothyroidism/hypertension.  Brother:  6, alive, stroke/diabetes.  Sister:  6, 1 , murder.  Son:  3, alive, denied medical problems.  Daughter:  2, alive, denied medical problems.     Previous Hospitalizations:  Childbirth.     ROS: No new diagnosis/surgery/prescriptions since last office visit on 2022.  Constitution: Negative for chills and fever.   HENT: " Negative for congestion.    Eyes: Negative for blurred vision.   Cardiovascular: Negative for chest pain.   Respiratory: Negative for cough.    Endocrine: Negative for polydipsia.   Hematologic/Lymphatic: Negative for adenopathy.   Skin: Negative for flushing and itching.   Musculoskeletal: Positive for back pain and joint pain. Negative for gout.   Gastrointestinal: Positive for heartburn. Negative for constipation and diarrhea.   Genitourinary: Negative for nocturia.   Neurological: Positive for headaches. Negative for seizures.   Psychiatric/Behavioral: Negative for depression and substance abuse. The patient is nervous/anxious.    Allergic/Immunologic: Positive for environmental allergies.             Objective:   Physical Exam:   General: AAOx3.  No acute distress  HEENT: Normocephalic, PEARLA EOMI, edentulous  Neck: Supple, No JVD  Chest: Symetric, equal excursion on inspiration  Abdomen: Soft NTND  Vascular:  Pulses intact and equal bilaterally.  Capillary refill less than 3 seconds and equal bilaterally  Neurologic:  Pinprick and soft touch intact and equal bilaterally.    Integment:  No ecchymosis, no errythema  Extremity:  Shoulder:  Forward flexion/abduction equal bilaterally 0/170 degrees.  Internal rotation equal bilaterally L1.  Negative drop-arm both shoulders.  Negative lift-off both shoulders.  Full can negative both shoulders.  Empty can negative both shoulders.  Benjamin/Neer positive primarily left shoulder with some mild right shoulder.  Cross-arm negative both shoulders.  Nontender over the AC joint bilaterally.  Nontender in the bicipital groove bilaterally.  Yergason's negative bilaterally.  Apprehension/relocation negative bilaterally.  Radiography:  Previous MRI of the left shoulder completed at [facility] through [facility] in [facility] on 08/18/2022 showed a SLAP lesion, AC arthritis, biceps thinning and a partial-thickness rotator cuff tear.  Previous x-rays of both  shoulders completed on 06/28/2022 showed advanced AC arthritis.  Laboratory:  Sodium 142; potassium 3.9; chloride 108; CO2 26; BUN 7; creatinine 0.8; glucose 105; WBC 5.51; hemoglobin 10.5; hematocrit 35.6; platelets 247; PT 11.3; INR 1.1.      Assessment:       Impression:       1. Partial-thickness rotator cuff tear, left shoulder   2. Slap lesion, left shoulder   3.   4.   5.   6.   Biceps tendonitis, left shoulder  AC arthritis, left shoulder   Impingement, left shoulder.    Left shoulder pain          Plan:       1.  Discussed physical examination with the patient. Nely understands that she still has partial-thickness rotator cuff tear, biceps tendinitis, slap lesion, and AC arthritis of her left shoulder.  Treatment alternatives and outcomes were discussed with the patient she understands she could continue to be treated conservatively with observation, activity modification, NSAIDs, bracing, physical/occupational therapy, injections, or she could consider surgical intervention such arthroscopy with rotator cuff repair.  She has failed conservative management and would like to proceed with surgical intervention on her shoulder.  2. Possible complications of surgery to include bleeding, infection, scarring, nerve/blood vessel/tendon damage, need for further surgery, failed surgery, failure to improve, possible persistent pain, possible stiffness, possible arthritis, and possible recurrence were discussed with the patient.  The patient was permitted to ask questions and all concerns were addressed to her satisfaction.    3. Consent for arthroscopy of the left shoulder with a possible open rotator cuff repair, biceps tenodesis, distal clavicle resection, and subacromial decompression.  4. Surgery is currently scheduled for 01/05/2023  5. 5. [id number] mg, 1 p.o. TID, dispense 15, refill 0, prescription was forwarded to the patient's pharmacy by [facility].  She understands this is for postop use.  6. Continue  with NSAIDs as tolerated allowed by PCM.  7. Continue to do home exercises as previously shown discussed.    8. The patient understands she needs to discuss with her pain management physician postoperative narcotic pain management.    9. Follow up approximately 10-12 days postoperatively

## 2023-01-06 ENCOUNTER — TELEPHONE (OUTPATIENT)
Dept: ORTHOPEDICS | Facility: CLINIC | Age: 53
End: 2023-01-06
Payer: MEDICAID

## 2023-01-09 ENCOUNTER — TELEPHONE (OUTPATIENT)
Dept: ORTHOPEDICS | Facility: CLINIC | Age: 53
End: 2023-01-09
Payer: MEDICAID

## 2023-01-09 NOTE — TELEPHONE ENCOUNTER
I left a voice mail stating I had faxed a letter over to her cardiologist office to get her pre-operative risk assessment and to call our office back with any further questions or concerns.

## 2023-02-16 ENCOUNTER — TELEPHONE (OUTPATIENT)
Dept: ORTHOPEDICS | Facility: CLINIC | Age: 53
End: 2023-02-16
Payer: MEDICAID

## 2023-02-16 NOTE — TELEPHONE ENCOUNTER
I contacted the patient. The patient wanted to know if the appointment on 2/28/23 was her surgery date or an appointment to schedule surgery. I stated it is an appointment to schedule surgery. The patient stated understanding.    ----- Message from Julianne Edwards sent at 2/16/2023  1:49 PM CST -----  Pt came office. Wants somebody to call her.

## 2023-02-28 ENCOUNTER — OFFICE VISIT (OUTPATIENT)
Dept: ORTHOPEDICS | Facility: CLINIC | Age: 53
End: 2023-02-28
Payer: MEDICAID

## 2023-02-28 VITALS — BODY MASS INDEX: 48.82 KG/M2 | WEIGHT: 293 LBS | RESPIRATION RATE: 16 BRPM | HEIGHT: 65 IN

## 2023-02-28 DIAGNOSIS — S43.432A SUPERIOR LABRUM ANTERIOR-TO-POSTERIOR (SLAP) TEAR OF LEFT SHOULDER: ICD-10-CM

## 2023-02-28 DIAGNOSIS — M75.22 TENDONITIS OF UPPER BICEPS TENDON OF LEFT SHOULDER: ICD-10-CM

## 2023-02-28 DIAGNOSIS — M75.42 IMPINGEMENT SYNDROME OF BOTH SHOULDERS: ICD-10-CM

## 2023-02-28 DIAGNOSIS — S46.012D TRAUMATIC INCOMPLETE TEAR OF LEFT ROTATOR CUFF, SUBSEQUENT ENCOUNTER: Primary | ICD-10-CM

## 2023-02-28 DIAGNOSIS — Z01.818 PREOP TESTING: ICD-10-CM

## 2023-02-28 DIAGNOSIS — M25.512 CHRONIC LEFT SHOULDER PAIN: ICD-10-CM

## 2023-02-28 DIAGNOSIS — M19.012 ARTHRITIS OF LEFT ACROMIOCLAVICULAR JOINT: ICD-10-CM

## 2023-02-28 DIAGNOSIS — M75.41 IMPINGEMENT SYNDROME OF BOTH SHOULDERS: ICD-10-CM

## 2023-02-28 DIAGNOSIS — G89.29 CHRONIC LEFT SHOULDER PAIN: ICD-10-CM

## 2023-02-28 PROCEDURE — 3008F BODY MASS INDEX DOCD: CPT | Mod: CPTII,,, | Performed by: ORTHOPAEDIC SURGERY

## 2023-02-28 PROCEDURE — 3008F PR BODY MASS INDEX (BMI) DOCUMENTED: ICD-10-PCS | Mod: CPTII,,, | Performed by: ORTHOPAEDIC SURGERY

## 2023-02-28 PROCEDURE — 4010F PR ACE/ARB THEARPY RXD/TAKEN: ICD-10-PCS | Mod: CPTII,,, | Performed by: ORTHOPAEDIC SURGERY

## 2023-02-28 PROCEDURE — 99999 PR PBB SHADOW E&M-EST. PATIENT-LVL III: CPT | Mod: PBBFAC,,, | Performed by: ORTHOPAEDIC SURGERY

## 2023-02-28 PROCEDURE — 99214 OFFICE O/P EST MOD 30 MIN: CPT | Mod: 57,S$PBB,, | Performed by: ORTHOPAEDIC SURGERY

## 2023-02-28 PROCEDURE — 99214 PR OFFICE/OUTPT VISIT, EST, LEVL IV, 30-39 MIN: ICD-10-PCS | Mod: 57,S$PBB,, | Performed by: ORTHOPAEDIC SURGERY

## 2023-02-28 PROCEDURE — 99213 OFFICE O/P EST LOW 20 MIN: CPT | Mod: PBBFAC,PN | Performed by: ORTHOPAEDIC SURGERY

## 2023-02-28 PROCEDURE — 1159F MED LIST DOCD IN RCRD: CPT | Mod: CPTII,,, | Performed by: ORTHOPAEDIC SURGERY

## 2023-02-28 PROCEDURE — 99999 PR PBB SHADOW E&M-EST. PATIENT-LVL III: ICD-10-PCS | Mod: PBBFAC,,, | Performed by: ORTHOPAEDIC SURGERY

## 2023-02-28 PROCEDURE — 4010F ACE/ARB THERAPY RXD/TAKEN: CPT | Mod: CPTII,,, | Performed by: ORTHOPAEDIC SURGERY

## 2023-02-28 PROCEDURE — 1159F PR MEDICATION LIST DOCUMENTED IN MEDICAL RECORD: ICD-10-PCS | Mod: CPTII,,, | Performed by: ORTHOPAEDIC SURGERY

## 2023-02-28 RX ORDER — CLINDAMYCIN HYDROCHLORIDE 300 MG/1
300 CAPSULE ORAL EVERY 8 HOURS
Qty: 15 CAPSULE | Refills: 0 | Status: SHIPPED | OUTPATIENT
Start: 2023-02-28 | End: 2023-04-13 | Stop reason: CLARIF

## 2023-02-28 NOTE — PROGRESS NOTES
"Chief Complaint:  Left shoulder pain     HPI:  Ms. Salas is a 52-year-old right-hand-dominant lady who returned today for re-evaluation of persistent pain in her left shoulder.   She was scheduled for surgery on her left shoulder for 2023 but had to postpone her surgery because she had a recent cardiac catheterization and had not obtained a preoperative risk assessment post catheterization..  She has had 1 year of bilateral shoulder pain after she was involved in a [employer]" last year.  Circumduction, forward flexion, and abduction increases her symptoms.   Her symptoms awaken her at night.  She has taken NSAIDs with help.  She has not worn a brace.  She has had steroid injections which have not resolved her symptoms.             Past Medical History:   Diagnosis Date     *  *  *  *  *  *  * Hypertension  Seasonal allergies  Anxiety  GERD  Headaches  Sickle cell trait  Fibromyalgia  Chronic pain management                  Past Surgical History:   Procedure Laterality Date    CHOLECYSTECTOMY         *  *  *  *    * FALLOPIAN TUBOPLASTY    EGD  A1 PULLEY RELEASE LEFT THUMB  CARDIAC CATHETERIZATION                     Review of patient's allergies indicates:   Allergen Reactions    Bactrim [sulfamethoxazole-trimethoprim] Nausea And Vomiting    Zithromax [azithromycin] Nausea And Vomiting         Social History                Occupational History    UNEMPLOYED   Tobacco Use    Smoking status: Never Smoker    Smokeless tobacco: Never Used   Substance and Sexual Activity    Alcohol use: Yes       Frequency: Monthly or less    Drug use: Never    Sexual activity: Yes       Partners: Male      Family history:  Father:  Alive, stroke.  Mother:  Alive, hypothyroidism/hypertension.  Brother:  6, alive, stroke/diabetes.  Sister:  6, 1 , murder.  Son:  3, alive, denied medical problems.  Daughter:  2, alive, denied medical problems.     Previous Hospitalizations:  Childbirth.     ROS: New " diagnosis/surgery/prescriptions since last office visit on 12/28/2022:  Cardiac catheterization  Constitution: Negative for chills and fever.   HENT: Negative for congestion.    Eyes: Negative for blurred vision.   Cardiovascular: Negative for chest pain.   Respiratory: Negative for cough.    Endocrine: Negative for polydipsia.   Hematologic/Lymphatic: Negative for adenopathy.   Skin: Negative for flushing and itching.   Musculoskeletal: Positive for back pain and joint pain. Negative for gout.   Gastrointestinal: Positive for heartburn. Negative for constipation and diarrhea.   Genitourinary: Negative for nocturia.   Neurological: Positive for headaches. Negative for seizures.   Psychiatric/Behavioral: Negative for depression and substance abuse. The patient is nervous/anxious.    Allergic/Immunologic: Positive for environmental allergies.             Objective:   Physical Exam:   General: AAOx3.  No acute distress  HEENT: Normocephalic, PEARLA EOMI, edentulous  Neck: Supple, No JVD  Chest: Symetric, equal excursion on inspiration  Abdomen: Soft NTND  Vascular:  Pulses intact and equal bilaterally.  Capillary refill less than 3 seconds and equal bilaterally  Neurologic:  Pinprick and soft touch intact and equal bilaterally.    Integment:  No ecchymosis, no errythema  Extremity:  Shoulder:  Forward flexion/abduction equal bilaterally 0/170 degrees.  Internal rotation equal bilaterally L1.  Negative drop-arm both shoulders.  Negative lift-off both shoulders.  Full can negative both shoulders.  Empty can negative both shoulders.  Benjamin/Neer positive primarily left shoulder with some mild right shoulder.  Cross-arm negative both shoulders.  Nontender over the AC joint bilaterally.  Nontender in the bicipital groove bilaterally.  Yergason's negative bilaterally.  Apprehension/relocation negative bilaterally.  Radiography:  Previous MRI of the left shoulder completed at [facility] in [place]  [place] on 08/18/2022 showed a SLAP lesion, AC arthritis, biceps thinning and a partial-thickness rotator cuff tear.  Previous x-rays of both shoulders completed on 06/28/2022 showed advanced AC arthritis.      Assessment:       Impression:       1. Partial-thickness rotator cuff tear, left shoulder   2. Slap lesion, left shoulder   3.   4.   5.   6.   7.   Biceps tendonitis, left shoulder  AC arthritis, left shoulder   Impingement, left shoulder.    Subacromial bursitis, left shoulder.  Left shoulder pain          Plan:       1.  Discussed physical examination with the patient. Nely understands that she still has partial-thickness rotator cuff tear, biceps tendinitis, slap lesion, subacromial bursitis, and AC arthritis of her left shoulder.  Treatment alternatives and outcomes were discussed with the patient she understands she could continue to be treated conservatively with observation, activity modification, NSAIDs, bracing, physical/occupational therapy, injections, or she could consider surgical intervention such arthroscopy with rotator cuff repair.  She brought with her a preoperative risk assessment completed by her cardiologist which puts her at low risk so she would like to reschedule surgical intervention on her shoulder as she feels she has failed conservative management..  2. Possible complications of surgery to include bleeding, infection, scarring, nerve/blood vessel/tendon damage, need for further surgery, failed surgery, failure to improve, possible persistent pain, possible stiffness, possible arthritis, and possible recurrence were discussed with the patient.  The patient was permitted to ask questions and all concerns were addressed to her satisfaction.    3. Consent for arthroscopy of the left shoulder with a possible open rotator cuff repair, biceps tenodesis, distal clavicle resection, and subacromial bursectomy..  4. Tentatively reschedule surgery for 0 3/13/2023  5. [id number] mg, 1  p.o. TID, dispense 15, refill 0, prescription was forwarded to the patient's pharmacy by [facility].  She understands this is for postop use.  6. Continue with NSAIDs as tolerated allowed by PCM.  7. Continue to do home exercises as previously shown discussed.    8. The patient understands she needs to discuss with her pain management physician postoperative narcotic pain management.    9. Follow up approximately 10-12 days postop.

## 2023-02-28 NOTE — H&P (VIEW-ONLY)
"Chief Complaint:  Left shoulder pain     HPI:  Ms. Salas is a 52-year-old right-hand-dominant lady who returned today for re-evaluation of persistent pain in her left shoulder.   She was scheduled for surgery on her left shoulder for 2023 but had to postpone her surgery because she had a recent cardiac catheterization and had not obtained a preoperative risk assessment post catheterization..  She has had 1 year of bilateral shoulder pain after she was involved in a [employer]" last year.  Circumduction, forward flexion, and abduction increases her symptoms.   Her symptoms awaken her at night.  She has taken NSAIDs with help.  She has not worn a brace.  She has had steroid injections which have not resolved her symptoms.             Past Medical History:   Diagnosis Date     *  *  *  *  *  *  * Hypertension  Seasonal allergies  Anxiety  GERD  Headaches  Sickle cell trait  Fibromyalgia  Chronic pain management                  Past Surgical History:   Procedure Laterality Date    CHOLECYSTECTOMY         *  *  *  *    * FALLOPIAN TUBOPLASTY    EGD  A1 PULLEY RELEASE LEFT THUMB  CARDIAC CATHETERIZATION                     Review of patient's allergies indicates:   Allergen Reactions    Bactrim [sulfamethoxazole-trimethoprim] Nausea And Vomiting    Zithromax [azithromycin] Nausea And Vomiting         Social History                Occupational History    UNEMPLOYED   Tobacco Use    Smoking status: Never Smoker    Smokeless tobacco: Never Used   Substance and Sexual Activity    Alcohol use: Yes       Frequency: Monthly or less    Drug use: Never    Sexual activity: Yes       Partners: Male      Family history:  Father:  Alive, stroke.  Mother:  Alive, hypothyroidism/hypertension.  Brother:  6, alive, stroke/diabetes.  Sister:  6, 1 , murder.  Son:  3, alive, denied medical problems.  Daughter:  2, alive, denied medical problems.     Previous Hospitalizations:  Childbirth.     ROS: New " diagnosis/surgery/prescriptions since last office visit on 12/28/2022:  Cardiac catheterization  Constitution: Negative for chills and fever.   HENT: Negative for congestion.    Eyes: Negative for blurred vision.   Cardiovascular: Negative for chest pain.   Respiratory: Negative for cough.    Endocrine: Negative for polydipsia.   Hematologic/Lymphatic: Negative for adenopathy.   Skin: Negative for flushing and itching.   Musculoskeletal: Positive for back pain and joint pain. Negative for gout.   Gastrointestinal: Positive for heartburn. Negative for constipation and diarrhea.   Genitourinary: Negative for nocturia.   Neurological: Positive for headaches. Negative for seizures.   Psychiatric/Behavioral: Negative for depression and substance abuse. The patient is nervous/anxious.    Allergic/Immunologic: Positive for environmental allergies.             Objective:   Physical Exam:   General: AAOx3.  No acute distress  HEENT: Normocephalic, PEARLA EOMI, edentulous  Neck: Supple, No JVD  Chest: Symetric, equal excursion on inspiration  Abdomen: Soft NTND  Vascular:  Pulses intact and equal bilaterally.  Capillary refill less than 3 seconds and equal bilaterally  Neurologic:  Pinprick and soft touch intact and equal bilaterally.    Integment:  No ecchymosis, no errythema  Extremity:  Shoulder:  Forward flexion/abduction equal bilaterally 0/170 degrees.  Internal rotation equal bilaterally L1.  Negative drop-arm both shoulders.  Negative lift-off both shoulders.  Full can negative both shoulders.  Empty can negative both shoulders.  Benjamin/Neer positive primarily left shoulder with some mild right shoulder.  Cross-arm negative both shoulders.  Nontender over the AC joint bilaterally.  Nontender in the bicipital groove bilaterally.  Yergason's negative bilaterally.  Apprehension/relocation negative bilaterally.  Radiography:  Previous MRI of the left shoulder completed at [facility] in [place]  [place] on 08/18/2022 showed a SLAP lesion, AC arthritis, biceps thinning and a partial-thickness rotator cuff tear.  Previous x-rays of both shoulders completed on 06/28/2022 showed advanced AC arthritis.      Assessment:       Impression:       1. Partial-thickness rotator cuff tear, left shoulder   2. Slap lesion, left shoulder   3.   4.   5.   6.   7.   Biceps tendonitis, left shoulder  AC arthritis, left shoulder   Impingement, left shoulder.    Subacromial bursitis, left shoulder.  Left shoulder pain          Plan:       1.  Discussed physical examination with the patient. Nely understands that she still has partial-thickness rotator cuff tear, biceps tendinitis, slap lesion, subacromial bursitis, and AC arthritis of her left shoulder.  Treatment alternatives and outcomes were discussed with the patient she understands she could continue to be treated conservatively with observation, activity modification, NSAIDs, bracing, physical/occupational therapy, injections, or she could consider surgical intervention such arthroscopy with rotator cuff repair.  She brought with her a preoperative risk assessment completed by her cardiologist which puts her at low risk so she would like to reschedule surgical intervention on her shoulder as she feels she has failed conservative management..  2. Possible complications of surgery to include bleeding, infection, scarring, nerve/blood vessel/tendon damage, need for further surgery, failed surgery, failure to improve, possible persistent pain, possible stiffness, possible arthritis, and possible recurrence were discussed with the patient.  The patient was permitted to ask questions and all concerns were addressed to her satisfaction.    3. Consent for arthroscopy of the left shoulder with a possible open rotator cuff repair, biceps tenodesis, distal clavicle resection, and subacromial bursectomy..  4. Tentatively reschedule surgery for 0 3/13/2023  5. [id number] mg, 1  p.o. TID, dispense 15, refill 0, prescription was forwarded to the patient's pharmacy by [facility] [employer].  She understands this is for postop use.  6. Continue with NSAIDs as tolerated allowed by PCM.  7. Continue to do home exercises as previously shown discussed.    8. The patient understands she needs to discuss with her pain management physician postoperative narcotic pain management.    9. Follow up approximately 10-12 days postop.

## 2023-02-28 NOTE — H&P
"Chief Complaint:  Left shoulder pain     HPI:  Ms. Salas is a 52-year-old right-hand-dominant lady who returned today for re-evaluation of persistent pain in her left shoulder.   She was scheduled for surgery on her left shoulder for 2023 but had to postpone her surgery because she had a recent cardiac catheterization and had not obtained a preoperative risk assessment post catheterization..  She has had 1 year of bilateral shoulder pain after she was involved in a [employer]" last year.  Circumduction, forward flexion, and abduction increases her symptoms.   Her symptoms awaken her at night.  She has taken NSAIDs with help.  She has not worn a brace.  She has had steroid injections which have not resolved her symptoms.             Past Medical History:   Diagnosis Date     *  *  *  *  *  *  * Hypertension  Seasonal allergies  Anxiety  GERD  Headaches  Sickle cell trait  Fibromyalgia  Chronic pain management                  Past Surgical History:   Procedure Laterality Date    CHOLECYSTECTOMY         *  *  *  *    * FALLOPIAN TUBOPLASTY    EGD  A1 PULLEY RELEASE LEFT THUMB  CARDIAC CATHETERIZATION                     Review of patient's allergies indicates:   Allergen Reactions    Bactrim [sulfamethoxazole-trimethoprim] Nausea And Vomiting    Zithromax [azithromycin] Nausea And Vomiting         Social History                Occupational History    UNEMPLOYED   Tobacco Use    Smoking status: Never Smoker    Smokeless tobacco: Never Used   Substance and Sexual Activity    Alcohol use: Yes       Frequency: Monthly or less    Drug use: Never    Sexual activity: Yes       Partners: Male      Family history:  Father:  Alive, stroke.  Mother:  Alive, hypothyroidism/hypertension.  Brother:  6, alive, stroke/diabetes.  Sister:  6, 1 , murder.  Son:  3, alive, denied medical problems.  Daughter:  2, alive, denied medical problems.     Previous Hospitalizations:  Childbirth.     ROS: New " diagnosis/surgery/prescriptions since last office visit on 12/28/2022:  Cardiac catheterization  Constitution: Negative for chills and fever.   HENT: Negative for congestion.    Eyes: Negative for blurred vision.   Cardiovascular: Negative for chest pain.   Respiratory: Negative for cough.    Endocrine: Negative for polydipsia.   Hematologic/Lymphatic: Negative for adenopathy.   Skin: Negative for flushing and itching.   Musculoskeletal: Positive for back pain and joint pain. Negative for gout.   Gastrointestinal: Positive for heartburn. Negative for constipation and diarrhea.   Genitourinary: Negative for nocturia.   Neurological: Positive for headaches. Negative for seizures.   Psychiatric/Behavioral: Negative for depression and substance abuse. The patient is nervous/anxious.    Allergic/Immunologic: Positive for environmental allergies.             Objective:   Physical Exam:   General: AAOx3.  No acute distress  HEENT: Normocephalic, PEARLA EOMI, edentulous  Neck: Supple, No JVD  Chest: Symetric, equal excursion on inspiration  Abdomen: Soft NTND  Vascular:  Pulses intact and equal bilaterally.  Capillary refill less than 3 seconds and equal bilaterally  Neurologic:  Pinprick and soft touch intact and equal bilaterally.    Integment:  No ecchymosis, no errythema  Extremity:  Shoulder:  Forward flexion/abduction equal bilaterally 0/170 degrees.  Internal rotation equal bilaterally L1.  Negative drop-arm both shoulders.  Negative lift-off both shoulders.  Full can negative both shoulders.  Empty can negative both shoulders.  Benjamin/Neer positive primarily left shoulder with some mild right shoulder.  Cross-arm negative both shoulders.  Nontender over the AC joint bilaterally.  Nontender in the bicipital groove bilaterally.  Yergason's negative bilaterally.  Apprehension/relocation negative bilaterally.  Radiography:  Previous MRI of the left shoulder completed at [facility] in [place]  [place] on 08/18/2022 showed a SLAP lesion, AC arthritis, biceps thinning and a partial-thickness rotator cuff tear.  Previous x-rays of both shoulders completed on 06/28/2022 showed advanced AC arthritis.      Assessment:       Impression:       1. Partial-thickness rotator cuff tear, left shoulder   2. Slap lesion, left shoulder   3.   4.   5.   6.   7.   Biceps tendonitis, left shoulder  AC arthritis, left shoulder   Impingement, left shoulder.    Subacromial bursitis, left shoulder.  Left shoulder pain          Plan:       1.  Discussed physical examination with the patient. Nely understands that she still has partial-thickness rotator cuff tear, biceps tendinitis, slap lesion, subacromial bursitis, and AC arthritis of her left shoulder.  Treatment alternatives and outcomes were discussed with the patient she understands she could continue to be treated conservatively with observation, activity modification, NSAIDs, bracing, physical/occupational therapy, injections, or she could consider surgical intervention such arthroscopy with rotator cuff repair.  She brought with her a preoperative risk assessment completed by her cardiologist which puts her at low risk so she would like to reschedule surgical intervention on her shoulder as she feels she has failed conservative management..  2. Possible complications of surgery to include bleeding, infection, scarring, nerve/blood vessel/tendon damage, need for further surgery, failed surgery, failure to improve, possible persistent pain, possible stiffness, possible arthritis, and possible recurrence were discussed with the patient.  The patient was permitted to ask questions and all concerns were addressed to her satisfaction.    3. Consent for arthroscopy of the left shoulder with a possible open rotator cuff repair, biceps tenodesis, distal clavicle resection, and subacromial bursectomy..  4. Tentatively reschedule surgery for 0 3/13/2023  5. [id number] mg, 1  p.o. TID, dispense 15, refill 0, prescription was forwarded to the patient's pharmacy by [facility].  She understands this is for postop use.  6. Continue with NSAIDs as tolerated allowed by PCM.  7. Continue to do home exercises as previously shown discussed.    8. The patient understands she needs to discuss with her pain management physician postoperative narcotic pain management.    9. Follow up approximately 10-12 days postop.

## 2023-03-06 ENCOUNTER — HOSPITAL ENCOUNTER (OUTPATIENT)
Dept: PREADMISSION TESTING | Facility: HOSPITAL | Age: 53
Discharge: HOME | End: 2023-03-06
Attending: ORTHOPAEDIC SURGERY
Payer: MEDICAID

## 2023-03-06 PROCEDURE — 99900103 DSU ONLY-NO CHARGE-INITIAL HR (STAT)

## 2023-03-06 RX ORDER — PREGABALIN 100 MG/1
100 CAPSULE ORAL 2 TIMES DAILY
COMMUNITY
End: 2023-10-03

## 2023-03-13 ENCOUNTER — NURSE TRIAGE (OUTPATIENT)
Dept: ADMINISTRATIVE | Facility: CLINIC | Age: 53
End: 2023-03-13
Payer: MEDICAID

## 2023-03-13 ENCOUNTER — HOSPITAL ENCOUNTER (OUTPATIENT)
Facility: HOSPITAL | Age: 53
Discharge: HOME | End: 2023-03-13
Attending: ORTHOPAEDIC SURGERY | Admitting: ORTHOPAEDIC SURGERY
Payer: MEDICAID

## 2023-03-13 VITALS
BODY MASS INDEX: 48.82 KG/M2 | OXYGEN SATURATION: 95 % | HEIGHT: 65 IN | WEIGHT: 293 LBS | RESPIRATION RATE: 13 BRPM | TEMPERATURE: 98 F | HEART RATE: 79 BPM | SYSTOLIC BLOOD PRESSURE: 151 MMHG | DIASTOLIC BLOOD PRESSURE: 75 MMHG

## 2023-03-13 DIAGNOSIS — S46.012D TRAUMATIC INCOMPLETE TEAR OF LEFT ROTATOR CUFF, SUBSEQUENT ENCOUNTER: ICD-10-CM

## 2023-03-13 DIAGNOSIS — S43.432A SUPERIOR LABRUM ANTERIOR-TO-POSTERIOR (SLAP) TEAR OF LEFT SHOULDER: Primary | ICD-10-CM

## 2023-03-13 DIAGNOSIS — M75.22 TENDONITIS OF UPPER BICEPS TENDON OF LEFT SHOULDER: ICD-10-CM

## 2023-03-13 DIAGNOSIS — M19.012 ARTHRITIS OF LEFT ACROMIOCLAVICULAR JOINT: ICD-10-CM

## 2023-03-13 PROBLEM — S46.012A TRAUMATIC INCOMPLETE TEAR OF LEFT ROTATOR CUFF: Status: ACTIVE | Noted: 2023-03-13

## 2023-03-13 LAB
B-HCG UR QL: NEGATIVE
POCT GLUCOSE: 120 MG/DL (ref 70–110)

## 2023-03-13 PROCEDURE — 37000008 HC ANESTHESIA 1ST 15 MINUTES: Performed by: ORTHOPAEDIC SURGERY

## 2023-03-13 PROCEDURE — D9220A PRA ANESTHESIA: Mod: ANES,,, | Performed by: ANESTHESIOLOGY

## 2023-03-13 PROCEDURE — 37000009 HC ANESTHESIA EA ADD 15 MINS: Performed by: ORTHOPAEDIC SURGERY

## 2023-03-13 PROCEDURE — D9220A PRA ANESTHESIA: Mod: CRNA,,, | Performed by: NURSE ANESTHETIST, CERTIFIED REGISTERED

## 2023-03-13 PROCEDURE — 23430 REPAIR BICEPS TENDON: CPT | Mod: 51,LT,, | Performed by: ORTHOPAEDIC SURGERY

## 2023-03-13 PROCEDURE — 63600175 PHARM REV CODE 636 W HCPCS

## 2023-03-13 PROCEDURE — 63600175 PHARM REV CODE 636 W HCPCS: Performed by: ANESTHESIOLOGY

## 2023-03-13 PROCEDURE — 36000711: Performed by: ORTHOPAEDIC SURGERY

## 2023-03-13 PROCEDURE — 71000015 HC POSTOP RECOV 1ST HR: Performed by: ORTHOPAEDIC SURGERY

## 2023-03-13 PROCEDURE — 23430 PR REPAIR BICEPS LONG TENDON: ICD-10-PCS | Mod: 51,LT,, | Performed by: ORTHOPAEDIC SURGERY

## 2023-03-13 PROCEDURE — 64415 PERIPHERAL BLOCK: ICD-10-PCS | Mod: 59,LT,, | Performed by: ANESTHESIOLOGY

## 2023-03-13 PROCEDURE — 25000003 PHARM REV CODE 250: Performed by: ORTHOPAEDIC SURGERY

## 2023-03-13 PROCEDURE — 27201423 OPTIME MED/SURG SUP & DEVICES STERILE SUPPLY: Performed by: ORTHOPAEDIC SURGERY

## 2023-03-13 PROCEDURE — 63600175 PHARM REV CODE 636 W HCPCS: Performed by: NURSE ANESTHETIST, CERTIFIED REGISTERED

## 2023-03-13 PROCEDURE — D9220A PRA ANESTHESIA: ICD-10-PCS | Mod: ANES,,, | Performed by: ANESTHESIOLOGY

## 2023-03-13 PROCEDURE — 36000710: Performed by: ORTHOPAEDIC SURGERY

## 2023-03-13 PROCEDURE — 71000033 HC RECOVERY, INTIAL HOUR: Performed by: ORTHOPAEDIC SURGERY

## 2023-03-13 PROCEDURE — 01716 ANES NRV MSC UPR A&E TNDSIS: CPT | Performed by: ORTHOPAEDIC SURGERY

## 2023-03-13 PROCEDURE — 23120 PR PARTIAL REMOVAL, CLAVICLE: ICD-10-PCS | Mod: 51,LT,, | Performed by: ORTHOPAEDIC SURGERY

## 2023-03-13 PROCEDURE — 23410 PR REPAIR ROTATOR CUFF,ACUTE: ICD-10-PCS | Mod: LT,,, | Performed by: ORTHOPAEDIC SURGERY

## 2023-03-13 PROCEDURE — 81025 URINE PREGNANCY TEST: CPT | Performed by: ORTHOPAEDIC SURGERY

## 2023-03-13 PROCEDURE — D9220A PRA ANESTHESIA: ICD-10-PCS | Mod: CRNA,,, | Performed by: NURSE ANESTHETIST, CERTIFIED REGISTERED

## 2023-03-13 PROCEDURE — C1713 ANCHOR/SCREW BN/BN,TIS/BN: HCPCS | Performed by: ORTHOPAEDIC SURGERY

## 2023-03-13 PROCEDURE — 71000039 HC RECOVERY, EACH ADD'L HOUR: Performed by: ORTHOPAEDIC SURGERY

## 2023-03-13 PROCEDURE — 64415 NJX AA&/STRD BRCH PLXS IMG: CPT | Performed by: ANESTHESIOLOGY

## 2023-03-13 PROCEDURE — 25000003 PHARM REV CODE 250: Performed by: NURSE ANESTHETIST, CERTIFIED REGISTERED

## 2023-03-13 PROCEDURE — 23120 CLAVICULECTOMY PARTIAL: CPT | Mod: 51,LT,, | Performed by: ORTHOPAEDIC SURGERY

## 2023-03-13 PROCEDURE — 23410 REPAIR ROTATOR CUFF ACUTE: CPT | Mod: LT,,, | Performed by: ORTHOPAEDIC SURGERY

## 2023-03-13 DEVICE — HEALIX ADVANCE BR ANCHOR W/DYNACORD TCP/PLGA ABSORBABLE ANCHOR (1) BLUE (1) WHITE/BLUE/GREEN STRIPED, SIZE 2 (5 METRIC) DYNACORD SUTURE, 36" (91CM) 4.5MM
Type: IMPLANTABLE DEVICE | Site: SHOULDER | Status: FUNCTIONAL
Brand: HEALIX ADVANCE BR ANCHOR W/DYNACORD

## 2023-03-13 RX ORDER — ONDANSETRON 2 MG/ML
4 INJECTION INTRAMUSCULAR; INTRAVENOUS DAILY PRN
Status: DISCONTINUED | OUTPATIENT
Start: 2023-03-13 | End: 2023-03-13 | Stop reason: HOSPADM

## 2023-03-13 RX ORDER — SODIUM CHLORIDE, SODIUM LACTATE, POTASSIUM CHLORIDE, CALCIUM CHLORIDE 600; 310; 30; 20 MG/100ML; MG/100ML; MG/100ML; MG/100ML
125 INJECTION, SOLUTION INTRAVENOUS CONTINUOUS
Status: DISCONTINUED | OUTPATIENT
Start: 2023-03-13 | End: 2023-03-13 | Stop reason: HOSPADM

## 2023-03-13 RX ORDER — SUCCINYLCHOLINE CHLORIDE 20 MG/ML
INJECTION INTRAMUSCULAR; INTRAVENOUS
Status: DISCONTINUED | OUTPATIENT
Start: 2023-03-13 | End: 2023-03-13

## 2023-03-13 RX ORDER — MIDAZOLAM HYDROCHLORIDE 1 MG/ML
INJECTION INTRAMUSCULAR; INTRAVENOUS
Status: COMPLETED
Start: 2023-03-13 | End: 2023-03-13

## 2023-03-13 RX ORDER — CEFAZOLIN SODIUM 2 G/50ML
SOLUTION INTRAVENOUS
Status: DISCONTINUED | OUTPATIENT
Start: 2023-03-13 | End: 2023-03-13

## 2023-03-13 RX ORDER — ROPIVACAINE HYDROCHLORIDE 5 MG/ML
INJECTION, SOLUTION EPIDURAL; INFILTRATION; PERINEURAL
Status: COMPLETED | OUTPATIENT
Start: 2023-03-13 | End: 2023-03-13

## 2023-03-13 RX ORDER — DIPHENHYDRAMINE HYDROCHLORIDE 50 MG/ML
12.5 INJECTION INTRAMUSCULAR; INTRAVENOUS
Status: DISCONTINUED | OUTPATIENT
Start: 2023-03-13 | End: 2023-03-13 | Stop reason: HOSPADM

## 2023-03-13 RX ORDER — MIDAZOLAM HYDROCHLORIDE 1 MG/ML
2 INJECTION INTRAMUSCULAR; INTRAVENOUS ONCE
Status: COMPLETED | OUTPATIENT
Start: 2023-03-13 | End: 2023-03-13

## 2023-03-13 RX ORDER — ONDANSETRON 2 MG/ML
INJECTION INTRAMUSCULAR; INTRAVENOUS
Status: DISCONTINUED | OUTPATIENT
Start: 2023-03-13 | End: 2023-03-13

## 2023-03-13 RX ORDER — CEFAZOLIN SODIUM 2 G/50ML
SOLUTION INTRAVENOUS
Status: COMPLETED
Start: 2023-03-13 | End: 2023-03-13

## 2023-03-13 RX ORDER — FENTANYL CITRATE 50 UG/ML
INJECTION, SOLUTION INTRAMUSCULAR; INTRAVENOUS
Status: DISCONTINUED | OUTPATIENT
Start: 2023-03-13 | End: 2023-03-13

## 2023-03-13 RX ORDER — MIDAZOLAM HYDROCHLORIDE 1 MG/ML
INJECTION INTRAMUSCULAR; INTRAVENOUS
Status: DISCONTINUED | OUTPATIENT
Start: 2023-03-13 | End: 2023-03-13

## 2023-03-13 RX ORDER — ROPIVACAINE HYDROCHLORIDE 5 MG/ML
30 INJECTION, SOLUTION EPIDURAL; INFILTRATION; PERINEURAL ONCE
Status: DISCONTINUED | OUTPATIENT
Start: 2023-03-13 | End: 2023-03-13

## 2023-03-13 RX ORDER — PROPOFOL 10 MG/ML
VIAL (ML) INTRAVENOUS
Status: DISCONTINUED | OUTPATIENT
Start: 2023-03-13 | End: 2023-03-13

## 2023-03-13 RX ORDER — LIDOCAINE HYDROCHLORIDE 20 MG/ML
INJECTION, SOLUTION EPIDURAL; INFILTRATION; INTRACAUDAL; PERINEURAL
Status: DISCONTINUED | OUTPATIENT
Start: 2023-03-13 | End: 2023-03-13

## 2023-03-13 RX ORDER — SODIUM CHLORIDE, SODIUM LACTATE, POTASSIUM CHLORIDE, CALCIUM CHLORIDE 600; 310; 30; 20 MG/100ML; MG/100ML; MG/100ML; MG/100ML
INJECTION, SOLUTION INTRAVENOUS CONTINUOUS
Status: DISCONTINUED | OUTPATIENT
Start: 2023-03-13 | End: 2023-03-13 | Stop reason: HOSPADM

## 2023-03-13 RX ORDER — ACETAMINOPHEN 10 MG/ML
INJECTION, SOLUTION INTRAVENOUS
Status: DISCONTINUED | OUTPATIENT
Start: 2023-03-13 | End: 2023-03-13

## 2023-03-13 RX ORDER — BUPIVACAINE HYDROCHLORIDE AND EPINEPHRINE 5; 5 MG/ML; UG/ML
INJECTION, SOLUTION EPIDURAL; INTRACAUDAL; PERINEURAL
Status: DISCONTINUED | OUTPATIENT
Start: 2023-03-13 | End: 2023-03-13 | Stop reason: HOSPADM

## 2023-03-13 RX ORDER — LIDOCAINE HYDROCHLORIDE 10 MG/ML
1 INJECTION, SOLUTION EPIDURAL; INFILTRATION; INTRACAUDAL; PERINEURAL ONCE
Status: DISCONTINUED | OUTPATIENT
Start: 2023-03-13 | End: 2023-03-13 | Stop reason: HOSPADM

## 2023-03-13 RX ORDER — HYDROMORPHONE HYDROCHLORIDE 2 MG/ML
0.2 INJECTION, SOLUTION INTRAMUSCULAR; INTRAVENOUS; SUBCUTANEOUS EVERY 5 MIN PRN
Status: DISCONTINUED | OUTPATIENT
Start: 2023-03-13 | End: 2023-03-13 | Stop reason: HOSPADM

## 2023-03-13 RX ORDER — LIDOCAINE HCL/EPINEPHRINE/PF 2%-1:200K
VIAL (ML) INJECTION
Status: DISCONTINUED | OUTPATIENT
Start: 2023-03-13 | End: 2023-03-13 | Stop reason: HOSPADM

## 2023-03-13 RX ADMIN — HYDROMORPHONE HYDROCHLORIDE 0.2 MG: 2 INJECTION, SOLUTION INTRAMUSCULAR; INTRAVENOUS; SUBCUTANEOUS at 10:03

## 2023-03-13 RX ADMIN — MIDAZOLAM HYDROCHLORIDE 2 MG: 1 INJECTION, SOLUTION INTRAMUSCULAR; INTRAVENOUS at 07:03

## 2023-03-13 RX ADMIN — ROPIVACAINE HYDROCHLORIDE 15 ML: 5 INJECTION, SOLUTION EPIDURAL; INFILTRATION; PERINEURAL at 07:03

## 2023-03-13 RX ADMIN — MIDAZOLAM HYDROCHLORIDE 2 MG: 1 INJECTION INTRAMUSCULAR; INTRAVENOUS at 07:03

## 2023-03-13 RX ADMIN — ONDANSETRON 4 MG: 2 INJECTION INTRAMUSCULAR; INTRAVENOUS at 08:03

## 2023-03-13 RX ADMIN — PROPOFOL 200 MG: 10 INJECTION, EMULSION INTRAVENOUS at 08:03

## 2023-03-13 RX ADMIN — SODIUM CHLORIDE, POTASSIUM CHLORIDE, SODIUM LACTATE AND CALCIUM CHLORIDE: 600; 310; 30; 20 INJECTION, SOLUTION INTRAVENOUS at 07:03

## 2023-03-13 RX ADMIN — FENTANYL CITRATE 100 MCG: 50 INJECTION, SOLUTION INTRAMUSCULAR; INTRAVENOUS at 07:03

## 2023-03-13 RX ADMIN — GLYCOPYRROLATE 0.2 MG: 0.2 INJECTION INTRAMUSCULAR; INTRAVENOUS at 08:03

## 2023-03-13 RX ADMIN — ACETAMINOPHEN 1000 MG: 10 INJECTION INTRAVENOUS at 08:03

## 2023-03-13 RX ADMIN — SUCCINYLCHOLINE CHLORIDE 100 MG: 20 INJECTION, SOLUTION INTRAMUSCULAR; INTRAVENOUS at 08:03

## 2023-03-13 RX ADMIN — CEFAZOLIN SODIUM 2 G: 2 SOLUTION INTRAVENOUS at 07:03

## 2023-03-13 RX ADMIN — LIDOCAINE HYDROCHLORIDE 40 MG: 20 INJECTION, SOLUTION EPIDURAL; INFILTRATION; INTRACAUDAL; PERINEURAL at 08:03

## 2023-03-13 NOTE — ANESTHESIA PROCEDURE NOTES
Peripheral Block    Patient location during procedure: pre-op   Block not for primary anesthetic.  Reason for block: at surgeon's request and post-op pain management   Post-op Pain Location: Left Shoulder   Start time: 3/13/2023 7:42 AM  Timeout: 3/13/2023 7:35 AM   End time: 3/13/2023 7:45 AM    Staffing  Authorizing Provider: Jero Mitchell MD  Performing Provider: Jero Mitchell MD    Staffing  Other anesthesia staff: Mercedes Curry CRNA  Preanesthetic Checklist  Completed: patient identified, IV checked, site marked, risks and benefits discussed, surgical consent, monitors and equipment checked, pre-op evaluation and timeout performed  Peripheral Block  Patient position: supine  Prep: ChloraPrep  Patient monitoring: heart rate, cardiac monitor, continuous pulse ox and frequent blood pressure checks  Block type: interscalene  Laterality: left  Injection technique: single shot  Needle  Needle type: Echogenic   Needle gauge: 21 G  Needle length: 2 in  Needle localization: anatomical landmarks and ultrasound guidance     Assessment  Injection assessment: negative aspiration, negative parasthesia and local visualized surrounding nerve  Paresthesia pain: none  Heart rate change: no  Slow fractionated injection: yes    Medications:    Medications: ropivacaine (NAROPIN) injection 0.5% - Perineural   15 mL - 3/13/2023 7:43:00 AM

## 2023-03-13 NOTE — ANESTHESIA POSTPROCEDURE EVALUATION
Anesthesia Post Evaluation    Patient: Nely Salas    Procedure(s) Performed: Procedure(s) (LRB):  Arhtroscopy Left Shouler with Possible Open Rotator Cuff Repair (Left)  ARTHROSCOPY, SHOULDER, WITH DISTAL CLAVICLE EXCISION (Left)  ARTHROSCOPY, SHOULDER, WITH SUBACROMIAL SPACE DECOMPRESSION (Left)  ARTHROSCOPY,SHOULDER,WITH BICEPS TENODESIS (Left)    Final Anesthesia Type: general      Patient location during evaluation: PACU  Patient participation: Yes- Able to Participate  Level of consciousness: awake and alert  Post-procedure vital signs: reviewed and stable  Pain management: adequate  Airway patency: patent    PONV status at discharge: No PONV  Anesthetic complications: no      Cardiovascular status: blood pressure returned to baseline  Respiratory status: unassisted  Hydration status: euvolemic  Follow-up not needed.          Vitals Value Taken Time   /75 03/13/23 1139   Temp 36.6 °C (97.9 °F) 03/13/23 0951   Pulse 70 03/13/23 1140   Resp 23 03/13/23 1140   SpO2 95 % 03/13/23 1139   Vitals shown include unvalidated device data.      Event Time   Out of Recovery 11:00:00         Pain/Mayra Score: Pain Rating Prior to Med Admin: 6 (3/13/2023 10:34 AM)  Pain Rating Post Med Admin: 4 (3/13/2023 10:59 AM)  Mayra Score: 10 (3/13/2023 10:43 AM)  Modified Mayra Score: 20 (3/13/2023 11:40 AM)

## 2023-03-13 NOTE — ANESTHESIA PROCEDURE NOTES
Intubation    Date/Time: 3/13/2023 8:01 AM  Performed by: Mercedes Curry CRNA  Authorized by: Jero Mitchell MD     Intubation:     Induction:  Intravenous    Intubated:  Postinduction    Mask Ventilation:  Easy mask    Attempts:  1    Attempted By:  CRNA    Method of Intubation:  Direct    Blade:  Spence 2    Laryngeal View Grade: Grade I - full view of cords      Difficult Airway Encountered?: No      Complications:  None    Airway Device:  Oral endotracheal tube    Airway Device Size:  7.0    Style/Cuff Inflation:  Cuffed (inflated to minimal occlusive pressure)    Tube secured:  22    Secured at:  The lips    Placement Verified By:  Capnometry    Complicating Factors:  None    Findings Post-Intubation:  BS equal bilateral and atraumatic/condition of teeth unchanged

## 2023-03-13 NOTE — PROGRESS NOTES
Pt awake and alert , holding down po fluids, dressings left shoulder remain clean and intact, left shoulder immobilizer in place

## 2023-03-13 NOTE — TRANSFER OF CARE
"Anesthesia Transfer of Care Note    Patient: Nely Salas    Procedure(s) Performed: Procedure(s) (LRB):  Arhtroscopy Left Shouler with Possible Open Rotator Cuff Repair (Left)  ARTHROSCOPY, SHOULDER, WITH DISTAL CLAVICLE EXCISION (Left)  ARTHROSCOPY, SHOULDER, WITH SUBACROMIAL SPACE DECOMPRESSION (Left)  ARTHROSCOPY,SHOULDER,WITH BICEPS TENODESIS (Left)    Patient location: PACU    Anesthesia Type: general    Transport from OR: Transported from OR on room air with adequate spontaneous ventilation    Post pain: adequate analgesia    Post assessment: no apparent anesthetic complications and tolerated procedure well    Post vital signs: stable    Level of consciousness: awake, alert and oriented    Nausea/Vomiting: no nausea/vomiting    Complications: none    Transfer of care protocol was followed      Last vitals:   Visit Vitals  BP (!) 178/84   Pulse 63   Temp 36.8 °C (98.3 °F) (Oral)   Resp 12   Ht 5' 5" (1.651 m)   Wt 133.8 kg (295 lb)   SpO2 97%   Breastfeeding No   BMI 49.09 kg/m²     "

## 2023-03-13 NOTE — OP NOTE
"[facility]  Orthopedic Surgery    3/13/2023    Nely Salas  [id number]      PREOPERATIVE DIAGNOSIS:   1. Traumatic incomplete tear of left rotator cuff, subsequent encounter [S46.012D]  2. Arthritis of left acromioclavicular joint [M19.012]  3. Tendonitis of upper biceps tendon of left shoulder [M75.22]  4. Superior labrum anterior-to-posterior (SLAP) tear of left shoulder [S43.432A]    POSTOPERATIVE DIAGNOSIS:    1. Inter substance rotator cuff tear, left shoulder.  2. SLAP lesion, left shoulder.    3. Biceps tendonitis, left shoulder.  4. Impingement left shoulder.    5. Subacromial bursitis, left shoulder.    6. AC arthritis, left shoulder.      PROCEDURE:  1. Arthroscopic labrum debridement, left shoulder.    2. Open rotator cuff repair, left shoulder.    3. Biceps tenodesis, left shoulder, with 2 Mitek Helix suture anchors.    4. Subacromial bursectomy, left shoulder.    5. Distal clavicle resection, left shoulder.      SURGEON: Wiliam Katz D.O.    ASSISTANT: Orton Grinnell, CFA    ANESTHESIA:  General    BLOOD LOSS:  Less than 5 cc    TOURNIQUET:  Non applicable    DRAINS:  None.    PATHOLOGY:  Shavings, distal clavicle, biceps tendon, and bursa    COMPLICATION:  None.    INDICATIONS FOR PROCEDURE:   Ms. Salas is a 52-year-old right-hand-dominant lady who has had 1 year of bilateral shoulder pain after she was involved in a [employer]".  Circumduction, forward flexion, and abduction increases her symptoms.   Her symptoms awaken her at night.  She has taken NSAIDs with help.  She has not worn a brace.  She has had steroid injections which have not resolved her symptoms.   She elected to proceed with surgery after she failed conservative management and complications to include bleeding, infection, scarring, nerve/blood vessel/tendon damage, need for further surgery, failed surgery, failure to improve, stiffness, arthritis, and possible recurrence were discussed.  She signed a " consent.    PROCEDURE IN DETAIL:  The patient was brought to the operating room and was transferred to the operating bed where all bony prominences were well padded.  General anesthesia was then administered by the Anesthesiology Department.  After general anesthesia was administered the patient was placed in a beach chair positioner and positioned appropriately all prominence was were well padded.  The patient's left shoulder was then prepped with chlorhexidine solution and draped in the normal sterile fashion.  After prepping and draping bony and soft tissue landmarks were palpated and incision sites were drawn on the patient's shoulder.  These were then anesthetized with a 50/50 mixture of lidocaine and Marcaine.  The patient's shoulder was then insufflated with irrigant solution.         Sharp incision was then made at the posterior portal site with a #11 blade followed by introduction of blunt trocar with cannula.  The trocar was removed and the camera was placed with inflow and outflow.  The camera was advanced to the anterior capsule and then the camera was removed from the posterior portal and a Wissinger des was placed in the posterior portal and advanced out the anterior shoulder.  Sharp incision was then made over the Wissinger des with a #11 blade.  An anterior portal was then established in normal fashion.  After establishing the anterior portal the Wissinger des was removed from the posterior portal and the camera was replaced in the posterior portal and the patient's shoulder was then inspected and probed in the normal fashion.  The labrum was inspected and there was a tear of the labrum.  This was debrided to stable labrum with a full-radius shaver.  The biceps tendon was inspected there was thinning and fraying of the biceps tendon.  The rotator cuff was inspected and the anchor was without a tear but there was an inter substance tear at the musculotendinous junction.  The posterior bald spot was  inspected and no major abnormalities were noted.  The inferior pouch was inspected and no major abnormalities were noted.  The posterior labrum was inspected and there was fraying of the posterior labrum this was debrided to stable labrum with a full-radius shaver.       An 18 gauge spinal needle was then advanced from the anterolateral shoulder into the shoulder joint and across the biceps tendon.  Prolene suture was then placed across the tendon and brought out the anterior portal.  The 18 gauge spinal needle was removed from the patient's shoulder.  The biceps tendon was then released from its anchor on the labrum with a VAPR Wand.  After release of the biceps tendon the anchor was then contoured with a full-radius shaver.  The patient's shoulder was then extensively irrigated and then evacuated with suction.  The camera and cannula were removed from the patient's shoulder.         Attention was then placed on the anterolateral shoulder where sharp incision was made with a #15 blade followed by dissection to the level of the deltoid.  The raphae a between the anterior middle deltoid was identified and opened exposing the subacromial space.  To gain better visualization in order to do a rotator cuff repair the distal acromion was removed with an oscillating saw and a power rasp contoured it.  A bursectomy was then completed with tenotomy scissors.  The rotator cuff tear was identified and repaired with Vicryl suture.  After repair of the rotator cuff the bicipital groove was identified and opened sharply with a#15 blade.  The previously released biceps tendon was then brought out the incision and the bicipital groove was then prepared to bleeding bone with a power rasp.  It was then copiously irrigated.  An awl was then utilized to make a canal for suture anchor.  A suture anchor was then placed in the canal and the suture from the suture anchor was placed through the biceps tendon while holding the patient's  elbow in the appropriate position.  The biceps tendon was then sewn into the bicipital groove and the biceps tendon was cut to length.  It was then irrigated and then the tissue was then closed with Vicryl suture.       A drill was then utilized to make a canal in the acromion followed by a tap.  A suture anchor was then placed in the canal and the suture from the suture anchor was placed through the deltoid and the deltoid was then reapproximated to the acromion after extensively irrigating.  After reapproximating the deltoid to the acromion attention was placed on the AC joint where was exposed in a subperiosteal fashion.  Hohmann retractors were then placed.  An oscillating saw was then utilized to remove the distal end of the clavicle.  The clavicle was further contoured with a power rasp.  It was then copiously irrigated.  The capsule was then reapproximated to itself with Vicryl suture.       The patient's shoulder was again copiously irrigated and then closed in layers with Vicryl suture with final plastic closure.  Her incision was then dressed with Mastisol, Steri-Strips, Adaptic, sterile gauze and op site dressing.  She was then placed in a shoulder immobilizer and rotated back into a supine position.  She was then awakened by anesthesia and transferred from the operating room to the recovery room in stable condition.  She tolerated the procedures well without complication.

## 2023-03-13 NOTE — PLAN OF CARE
Nerve block performed at bedside for post op pain mgmt by Dr Mitchell after pt agreed to procedure. Pt connected to tele and VS monitoring during procedure. Pt tolerated well.

## 2023-03-13 NOTE — DISCHARGE SUMMARY
[facility] - Surgery  Discharge Note  Short Stay    Procedure(s) (LRB):  Arhtroscopy Left Shouler with Possible Open Rotator Cuff Repair (Left)  ARTHROSCOPY, SHOULDER, WITH DISTAL CLAVICLE EXCISION (Left)  ARTHROSCOPY, SHOULDER, WITH SUBACROMIAL SPACE DECOMPRESSION (Left)  ARTHROSCOPY,SHOULDER,WITH BICEPS TENODESIS (Left)      OUTCOME: Patient tolerated treatment/procedure well without complication and is now ready for discharge.    DISPOSITION: Home or Self Care    FINAL DIAGNOSIS:    1. Inter substance rotator cuff tear, left shoulder.  2. SLAP lesion, left shoulder.    3. Biceps tendonitis, left shoulder.  4. Impingement left shoulder.    5. Subacromial bursitis, left shoulder.    6. AC arthritis, left shoulder.      FOLLOWUP: In clinic    DISCHARGE INSTRUCTIONS:    Discharge Procedure Orders   Diet Adult Regular     Keep surgical extremity elevated     Ice to affected area     Lifting restrictions   Order Comments: One-handed activities with the right hand only no lifting/pushing/pulling/climbing requiring the use of the left hand.  No activities on ladders/scaffolding/stairs.     Other restrictions (specify):   Order Comments: 1. Elevate and ice left shoulder.    2. Wear shoulder immobilizer at all times.  Sleep in shoulder immobilizer  3. May remove dressing in 3 days.  May shower after removal of dressing, do not soak in a tub.  Place fresh Band-Aids over incisions after removal of dressing.    4. One-handed activities with the right hand only no lifting/pushing/pulling/climbing requiring the use of the left hand.  No activities on ladders/scaffolding/stairs.     Notify your health care provider if you experience any of the following:  temperature >100.4      Remove dressing in 72 hours   Order Comments: May remove dressing in 3 days.  May shower after removal of dressing, do not soak in a tub.  Place Band-Aids over incisions after removal of dressing.     Shower on day dressing removed (No bath)   Order  Comments: May remove dressing in 3 days.  May shower after removal of dressing, do not soak in a tub.  Place Band-Aids over incisions after removal of dressing.        TIME SPENT ON DISCHARGE: 20 minutes

## 2023-03-14 ENCOUNTER — TELEPHONE (OUTPATIENT)
Dept: ORTHOPEDICS | Facility: CLINIC | Age: 53
End: 2023-03-14
Payer: MEDICAID

## 2023-03-14 NOTE — TELEPHONE ENCOUNTER
Had surgery today and is in extreme pain. On call provider attempted contact per triage nurse.  Reason for Disposition   [1] SEVERE post-op pain (e.g., excruciating, pain scale 8-10) AND [2] not controlled with pain medications    Additional Information   Negative: Sounds like a life-threatening emergency to the triager   Negative: Chest pain   Negative: Difficulty breathing   Negative: Acting confused (e.g., disoriented, slurred speech) or excessively sleepy   Negative: Post-Op tonsil and adenoid surgery, symptoms or questions about   Negative: Surgical incision symptoms and questions   Negative: [1] Pain or burning with passing urine (urination) AND [2] male   Negative: [1] Pain or burning with passing urine (urination) AND [2] female   Negative: Constipation   Negative: New or worsening leg (calf, thigh) pain   Negative: New or worsening leg swelling   Negative: Dizziness is severe, or persists > 24 hours after surgery   Negative: Pain, redness, swelling, or pus at IV Site   Negative: Symptoms arising from use of a urinary catheter (e.g., coude, [place])   Negative: Cast problems or questions   Negative: Medication question   Negative: [1] Widespread rash AND [2] bright red, sunburn-like   Negative: [1] SEVERE headache AND [2] after spinal (epidural) anesthesia   Negative: [1] Vomiting AND [2] persists > 4 hours   Negative: [1] Vomiting AND [2] abdomen looks much more swollen than usual   Negative: [1] Drinking very little AND [2] dehydration suspected (e.g., no urine > 12 hours, very dry mouth, very lightheaded)   Negative: Patient sounds very sick or weak to the triager   Negative: Sounds like a serious complication to the triager   Negative: Fever > 100.4 F (38.0 C)    Protocols used: Post-Op Symptoms and [id number]

## 2023-03-14 NOTE — TELEPHONE ENCOUNTER
----- Message from Chiki Monreal sent at 3/14/2023 10:05 AM CDT -----  Type: Needs Medical Advice  Who Called:  Patient  Symptoms (please be specific):  Post Op pain  How long has patient had these symptoms:      Pharmacy name and phone #:    [facility] - [place], [place] - [address]  [address] [place] [address]  Phone: [phone] Fax: [phone]          Best Call Back Number: [phone]  Additional Information: Please call to advise-- Pt states that she went to the ER last night

## 2023-03-24 ENCOUNTER — OFFICE VISIT (OUTPATIENT)
Dept: ORTHOPEDICS | Facility: CLINIC | Age: 53
End: 2023-03-24
Payer: MEDICAID

## 2023-03-24 VITALS — BODY MASS INDEX: 47.49 KG/M2 | WEIGHT: 285.06 LBS | HEIGHT: 65 IN | RESPIRATION RATE: 16 BRPM

## 2023-03-24 DIAGNOSIS — Z98.890 S/P ARTHROSCOPY OF LEFT SHOULDER: Primary | ICD-10-CM

## 2023-03-24 PROCEDURE — 1159F PR MEDICATION LIST DOCUMENTED IN MEDICAL RECORD: ICD-10-PCS | Mod: CPTII,,, | Performed by: ORTHOPAEDIC SURGERY

## 2023-03-24 PROCEDURE — 1159F MED LIST DOCD IN RCRD: CPT | Mod: CPTII,,, | Performed by: ORTHOPAEDIC SURGERY

## 2023-03-24 PROCEDURE — 4010F PR ACE/ARB THEARPY RXD/TAKEN: ICD-10-PCS | Mod: CPTII,,, | Performed by: ORTHOPAEDIC SURGERY

## 2023-03-24 PROCEDURE — 99999 PR PBB SHADOW E&M-EST. PATIENT-LVL III: ICD-10-PCS | Mod: PBBFAC,,, | Performed by: ORTHOPAEDIC SURGERY

## 2023-03-24 PROCEDURE — 99999 PR PBB SHADOW E&M-EST. PATIENT-LVL III: CPT | Mod: PBBFAC,,, | Performed by: ORTHOPAEDIC SURGERY

## 2023-03-24 PROCEDURE — 3008F PR BODY MASS INDEX (BMI) DOCUMENTED: ICD-10-PCS | Mod: CPTII,,, | Performed by: ORTHOPAEDIC SURGERY

## 2023-03-24 PROCEDURE — 99213 OFFICE O/P EST LOW 20 MIN: CPT | Mod: PBBFAC,PN | Performed by: ORTHOPAEDIC SURGERY

## 2023-03-24 PROCEDURE — 4010F ACE/ARB THERAPY RXD/TAKEN: CPT | Mod: CPTII,,, | Performed by: ORTHOPAEDIC SURGERY

## 2023-03-24 PROCEDURE — 3008F BODY MASS INDEX DOCD: CPT | Mod: CPTII,,, | Performed by: ORTHOPAEDIC SURGERY

## 2023-03-24 PROCEDURE — 99024 POSTOP FOLLOW-UP VISIT: CPT | Mod: ,,, | Performed by: ORTHOPAEDIC SURGERY

## 2023-03-24 PROCEDURE — 99024 PR POST-OP FOLLOW-UP VISIT: ICD-10-PCS | Mod: ,,, | Performed by: ORTHOPAEDIC SURGERY

## 2023-03-24 NOTE — PROGRESS NOTES
Subjective:      Patient ID: Nely Salas is a 52 y.o. female.    Chief Complaint: Post-op Evaluation and Pain of the Left Shoulder      HPI:  Ms. Salas returned today for her 1st postop visit after an arthroscopy of her left shoulder with labrum debridement rotator cuff repair, biceps tenodesis, subacromial bursectomy, and distal clavicle resection.  Her date of surgery 03/13/2023.  She states she is doing well but does have some pain in her shoulder.  Her shoulder pain began approximately 1 year ago after she was involved in a [employer] .    ROS:  New diagnosis/surgery/prescriptions since last office visit on 02/28/2023:  Arthroscopy left shoulder with labrum debridement rotator cuff repair, biceps tenodesis, subacromial bursectomy, and distal clavicle resection  Constitution: Negative for chills and fever.   HENT: Negative for congestion.    Eyes: Negative for blurred vision.   Cardiovascular: Negative for chest pain.   Respiratory: Negative for cough.    Endocrine: Negative for polydipsia.   Hematologic/Lymphatic: Negative for adenopathy.   Skin: Negative for flushing and itching.   Musculoskeletal: Positive for back pain and joint pain. Negative for gout.   Gastrointestinal: Positive for heartburn. Negative for constipation and diarrhea.   Genitourinary: Negative for nocturia.   Neurological: Positive for headaches. Negative for seizures.   Psychiatric/Behavioral: Negative for depression and substance abuse. The patient is nervous/anxious.    Allergic/Immunologic: Positive for environmental allergies.       Objective:      Physical Exam:   General: AAOx3.  No acute distress  Vascular:  Pulses intact and equal bilaterally.  Capillary refill less than 3 seconds and equal bilaterally  Neurologic:  Pinprick and soft touch intact and equal bilaterally  Integment:  Incisions well approximated and healing well.  Extremity:  Shoulder:  Passive forward flexion/abduction 0/30 degrees.  Mild tenderness with  motion.  Mild tenderness with palpation.  Relatively no swelling.  Good elbow motion passively without pain.  Good finger motion without pain.  Radiography:  No new x-rays done today.      Assessment:       Impression:     1. S/P arthroscopy left shoulder with labrum debridement rotator cuff repair, biceps tenodesis, subacromial bursectomy, and distal clavicle resection         Plan:       1.  Discussed physical examination and arthroscopic findings with the patient. Nely understands that she had an arthroscopy of her left shoulder with labrum debridement rotator cuff repair, biceps tenodesis, subacromial bursectomy, and distal clavicle resection.  She understands she appears to be doing well.  2. Continue with shoulder immobilizer.  3. Start home exercises such as Codman exercises these were shown discussed with the patient she also should continue do passive elbow exercises.    4. All pain control per her pain management physician.    5. Continue with one-handed activities with the right hand only no lifting/pushing/pulling/climbing requiring the use of the left hand.  No activities on ladders/scaffolding/stairs.    6. Follow up in 1 month expect to refer the patient to occupational therapy at next follow-up.

## 2023-04-13 ENCOUNTER — HOSPITAL ENCOUNTER (EMERGENCY)
Facility: HOSPITAL | Age: 53
Discharge: HOME | End: 2023-04-13
Attending: FAMILY MEDICINE
Payer: MEDICAID

## 2023-04-13 ENCOUNTER — TELEPHONE (OUTPATIENT)
Dept: ORTHOPEDICS | Facility: CLINIC | Age: 53
End: 2023-04-13
Payer: MEDICAID

## 2023-04-13 VITALS
WEIGHT: 285 LBS | BODY MASS INDEX: 47.48 KG/M2 | TEMPERATURE: 98 F | RESPIRATION RATE: 20 BRPM | SYSTOLIC BLOOD PRESSURE: 163 MMHG | HEIGHT: 65 IN | DIASTOLIC BLOOD PRESSURE: 79 MMHG | HEART RATE: 74 BPM | OXYGEN SATURATION: 100 %

## 2023-04-13 DIAGNOSIS — M25.512 CHRONIC LEFT SHOULDER PAIN: ICD-10-CM

## 2023-04-13 DIAGNOSIS — G89.28 CHRONIC POST-OPERATIVE PAIN: Primary | ICD-10-CM

## 2023-04-13 DIAGNOSIS — G89.29 CHRONIC LEFT SHOULDER PAIN: ICD-10-CM

## 2023-04-13 PROCEDURE — 96372 THER/PROPH/DIAG INJ SC/IM: CPT | Performed by: NURSE PRACTITIONER

## 2023-04-13 PROCEDURE — 63600175 PHARM REV CODE 636 W HCPCS: Performed by: NURSE PRACTITIONER

## 2023-04-13 PROCEDURE — 73200 CT SHOULDER WITHOUT CONTRAST LEFT: ICD-10-PCS | Mod: 26,LT,, | Performed by: RADIOLOGY

## 2023-04-13 PROCEDURE — 99285 EMERGENCY DEPT VISIT HI MDM: CPT | Mod: 25

## 2023-04-13 PROCEDURE — 73200 CT UPPER EXTREMITY W/O DYE: CPT | Mod: 26,LT,, | Performed by: RADIOLOGY

## 2023-04-13 PROCEDURE — 73200 CT UPPER EXTREMITY W/O DYE: CPT | Mod: TC,LT

## 2023-04-13 RX ORDER — HYDROMORPHONE HYDROCHLORIDE 1 MG/ML
1 INJECTION, SOLUTION INTRAMUSCULAR; INTRAVENOUS; SUBCUTANEOUS
Status: COMPLETED | OUTPATIENT
Start: 2023-04-13 | End: 2023-04-13

## 2023-04-13 RX ADMIN — HYDROMORPHONE HYDROCHLORIDE 1 MG: 1 INJECTION, SOLUTION INTRAMUSCULAR; INTRAVENOUS; SUBCUTANEOUS at 07:04

## 2023-04-13 NOTE — TELEPHONE ENCOUNTER
I contacted the patient after speaking with Dr. Katz. Per Dr. Katz, the patient should follow up with her primary care provider or proceed to the nearest urgent care or emergency room. The patient stated understanding and that her primary care provider cannot get her into the office for the next week. She stated she would go the urgent care or emergency room.    ----- Message from Elsie Robles MA sent at 4/13/2023 10:29 AM CDT -----  Contact: pt  Surgery last month,   Numb on left side breast   Call back

## 2023-04-14 NOTE — ED PROVIDER NOTES
Encounter Date: 4/13/2023       History     Chief Complaint   Patient presents with    Shoulder Pain     Pt reports left sided shoulder pain and swelling since shoulder surgery 03/13/23.      53-year-old patient presented to the ER with a complaint of ongoing left shoulder pain after surgery on March 13th per Dr. Katz. Patient stated po medication didn't help much, feeling pretty swollen on the left shoulder.     The history is provided by the patient.   Review of patient's allergies indicates:   Allergen Reactions    Bactrim [sulfamethoxazole-trimethoprim] Nausea And Vomiting    Zithromax [azithromycin] Nausea And Vomiting    Clotrimazole-betamethasone Other (See Comments)     Makes patient's feet red and cracks the skin more    Ketorolac Other (See Comments)     PT STATES IT DOES NOT WORK FOR HER    Morphine Hives    Potassium clavulanate      Other reaction(s): Unknown    Sulfamethoxazole Other (See Comments)    Trimethoprim Other (See Comments)    Amoxicillin trihydrate Nausea Only and Other (See Comments)    Sulfa (sulfonamide antibiotics) Nausea And Vomiting     Past Medical History:   Diagnosis Date    Anemia, unspecified     Diabetes mellitus, type 2     Diabetic neuropathy     GERD (gastroesophageal reflux disease)     Hypertension     Sleep apnea      Past Surgical History:   Procedure Laterality Date    ARTHROSCOPY OF SHOULDER WITH DECOMPRESSION OF SUBACROMIAL SPACE Left 3/13/2023    Procedure: ARTHROSCOPY, SHOULDER, WITH SUBACROMIAL SPACE DECOMPRESSION;  Surgeon: Wiliam Katz DO;  Location: [facility] OR;  Service: Orthopedics;  Laterality: Left;    ARTHROSCOPY OF SHOULDER WITH REMOVAL OF DISTAL CLAVICLE Left 3/13/2023    Procedure: ARTHROSCOPY, SHOULDER, WITH DISTAL CLAVICLE EXCISION;  Surgeon: Wiliam Katz DO;  Location: [facility] OR;  Service: Orthopedics;  Laterality: Left;    ARTHROSCOPY,SHOULDER,WITH BICEPS TENODESIS Left 3/13/2023    Procedure: ARTHROSCOPY,SHOULDER,WITH BICEPS TENODESIS;   Surgeon: Wiliam Katz DO;  Location: [facility] OR;  Service: Orthopedics;  Laterality: Left;     SECTION      CHOLECYSTECTOMY      FALLOPIAN TUBOPLASTY      SHOULDER ARTHROSCOPY Left 3/13/2023    Procedure: Arhtroscopy Left Shouler with Possible Open Rotator Cuff Repair;  Surgeon: Wiliam Katz DO;  Location: [facility] OR;  Service: Orthopedics;  Laterality: Left;    TRIGGER FINGER RELEASE Left 2022    Procedure: TRIGGER FINGER RELEASE LEFT THUMB;  Surgeon: Wiliam Katz DO;  Location: [facility] OR;  Service: Orthopedics;  Laterality: Left;     History reviewed. No pertinent family history.  Social History     Tobacco Use    Smoking status: Never    Smokeless tobacco: Never   Substance Use Topics    Alcohol use: Yes     Comment: social    Drug use: Never     Review of Systems   Musculoskeletal:  Positive for joint swelling.   All other systems reviewed and are negative.    Physical Exam     Initial Vitals [23]   BP Pulse Resp Temp SpO2   (!) 163/79 74 18 98.4 °F (36.9 °C) 100 %      MAP       --         Physical Exam    Nursing note and vitals reviewed.  Constitutional: She appears well-developed and well-nourished. She is Obese . No distress.   HENT:   Head: Normocephalic and atraumatic.   Eyes: EOM are normal. Pupils are equal, round, and reactive to light.   Neck:   Normal range of motion.  Cardiovascular:  Normal rate and regular rhythm.           No murmur heard.  Pulmonary/Chest: Breath sounds normal. No respiratory distress. She has no wheezes. She has no rhonchi. She has no rales. She exhibits no tenderness.   Abdominal: Abdomen is soft.   Musculoskeletal:      Right shoulder: Normal pulse.      Left shoulder: Tenderness present. Decreased range of motion. Normal pulse.        Arms:       Cervical back: Normal range of motion.     Neurological: She is alert and oriented to person, place, and time. She has normal strength. GCS score is 15. GCS eye subscore is 4. GCS verbal subscore is 5.  "GCS motor subscore is 6.   Skin: Skin is warm and dry.   Psychiatric: She has a normal mood and affect.       ED Course   Procedures  Labs Reviewed - No data to display       Imaging Results              CT Shoulder Without Contrast Left (In process)                      Medications   HYDROmorphone injection 1 mg (1 mg Intramuscular Given 4/13/23 1956)                       Medical Decision Making  Problems Addressed:  Chronic left shoulder pain: chronic illness or injury with exacerbation, progression, or side effects of treatment  Chronic post-operative pain: chronic illness or injury with exacerbation, progression, or side effects of treatment    Amount and/or Complexity of Data Reviewed  Radiology: ordered. Decision-making details documented in ED Course.     Details: VRAD reading "No acute bony abnormality. Postoperative changes as discussed. Deep soft tissue thickening and  edematous appearance around the shoulder as discussed with possibility of joint capsule thickening  or possibly a complex bursal collection. Exam in this regard is limited without IV contrast. There are  degenerative changes as discussed"    Risk  Prescription drug management.  Risk Details: Patient discharged to home in stable condition, understands and is in agreement with plan. Patient is able to repeat plan, and verbalize understanding; able to verbalize and repeat reasons to return to the ER.  She was advised to use cold compress on the left shoulder with shoulder immobilizer, follow up with DR. Katz as scheduled                Clinical Impression:   Final diagnoses:  [M25.512, G89.29] Chronic left shoulder pain  [G89.28] Chronic post-operative pain (Primary)        ED Disposition Condition    Discharge Stable          ED Prescriptions    None       Follow-up Information       Follow up With Specialties Details Why Contact Info    pain management  Schedule an appointment as soon as possible for a visit       [facility] - [facility] " Dept Emergency Medicine  If symptoms worsen [address] [facility] [id number]  [phone]             Omid Bonilla NP  04/13/23 2146

## 2023-04-14 NOTE — DISCHARGE INSTRUCTIONS
Apply cold compress to left shoulder as tolerated, shoulder immobilizer. Return for any worsening or new symptoms. Follow up with Dr. Katz.

## 2023-04-26 ENCOUNTER — OFFICE VISIT (OUTPATIENT)
Dept: ORTHOPEDICS | Facility: CLINIC | Age: 53
End: 2023-04-26
Payer: MEDICAID

## 2023-04-26 VITALS — RESPIRATION RATE: 16 BRPM | HEIGHT: 65 IN | WEIGHT: 285.06 LBS | BODY MASS INDEX: 47.49 KG/M2

## 2023-04-26 DIAGNOSIS — Z98.890 S/P ARTHROSCOPY OF LEFT SHOULDER: Primary | ICD-10-CM

## 2023-04-26 PROCEDURE — 4010F ACE/ARB THERAPY RXD/TAKEN: CPT | Mod: CPTII,,, | Performed by: ORTHOPAEDIC SURGERY

## 2023-04-26 PROCEDURE — 1159F PR MEDICATION LIST DOCUMENTED IN MEDICAL RECORD: ICD-10-PCS | Mod: CPTII,,, | Performed by: ORTHOPAEDIC SURGERY

## 2023-04-26 PROCEDURE — 4010F PR ACE/ARB THEARPY RXD/TAKEN: ICD-10-PCS | Mod: CPTII,,, | Performed by: ORTHOPAEDIC SURGERY

## 2023-04-26 PROCEDURE — 3008F BODY MASS INDEX DOCD: CPT | Mod: CPTII,,, | Performed by: ORTHOPAEDIC SURGERY

## 2023-04-26 PROCEDURE — 99213 OFFICE O/P EST LOW 20 MIN: CPT | Mod: PBBFAC | Performed by: ORTHOPAEDIC SURGERY

## 2023-04-26 PROCEDURE — 99999 PR PBB SHADOW E&M-EST. PATIENT-LVL III: ICD-10-PCS | Mod: PBBFAC,,, | Performed by: ORTHOPAEDIC SURGERY

## 2023-04-26 PROCEDURE — 99024 PR POST-OP FOLLOW-UP VISIT: ICD-10-PCS | Mod: ,,, | Performed by: ORTHOPAEDIC SURGERY

## 2023-04-26 PROCEDURE — 99999 PR PBB SHADOW E&M-EST. PATIENT-LVL III: CPT | Mod: PBBFAC,,, | Performed by: ORTHOPAEDIC SURGERY

## 2023-04-26 PROCEDURE — 1159F MED LIST DOCD IN RCRD: CPT | Mod: CPTII,,, | Performed by: ORTHOPAEDIC SURGERY

## 2023-04-26 PROCEDURE — 3008F PR BODY MASS INDEX (BMI) DOCUMENTED: ICD-10-PCS | Mod: CPTII,,, | Performed by: ORTHOPAEDIC SURGERY

## 2023-04-26 PROCEDURE — 99024 POSTOP FOLLOW-UP VISIT: CPT | Mod: ,,, | Performed by: ORTHOPAEDIC SURGERY

## 2023-04-26 NOTE — PROGRESS NOTES
Patient ID: Nely Salas is a 52 y.o. female.     Chief Complaint: Post-op Evaluation and Pain of the Left Shoulder        HPI:  Ms. Salas returned today for a 6 week follow-up visit after an arthroscopy of her left shoulder with labrum debridement rotator cuff repair, biceps tenodesis, subacromial bursectomy, and distal clavicle resection.  Her date of surgery 03/13/2023.  She states she is doing well but does have some pain in her shoulder.  Her shoulder pain originally began approximately 1 year ago after she was involved in a [email or web address] .     ROS:  No new diagnosis/surgery/prescriptions since last office visit on 03/24/2023  Constitution: Negative for chills and fever.   HENT: Negative for congestion.    Eyes: Negative for blurred vision.   Cardiovascular: Negative for chest pain.   Respiratory: Negative for cough.    Endocrine: Negative for polydipsia.   Hematologic/Lymphatic: Negative for adenopathy.   Skin: Negative for flushing and itching.   Musculoskeletal: Positive for back pain and joint pain. Negative for gout.   Gastrointestinal: Positive for heartburn. Negative for constipation and diarrhea.   Genitourinary: Negative for nocturia.   Neurological: Positive for headaches. Negative for seizures.   Psychiatric/Behavioral: Negative for depression and substance abuse. The patient is nervous/anxious.    Allergic/Immunologic: Positive for environmental allergies.       Objective:   Physical Exam:   General: AAOx3.  No acute distress  Vascular:  Pulses intact and equal bilaterally.  Capillary refill less than 3 seconds and equal bilaterally  Neurologic:  Pinprick and soft touch intact and equal bilaterally  Integment:  Incisions well approximated and healing well.  Extremity:  Shoulder:  Passive forward flexion/abduction 0/30 degrees.  Mild tenderness with motion.  Mild tenderness with palpation.  Relatively no swelling.  Good elbow motion passively without pain.  Good finger motion without  pain.  Radiography:  No new x-rays done today.      Assessment:       Impression:      1. S/P arthroscopy left shoulder with labrum debridement rotator cuff repair, biceps tenodesis, subacromial bursectomy, and distal clavicle resection          Plan:       1.  Discussed physical examination with the patient. Nely understands that she is now 6 weeks postop and at this point she can start an aggressive motion with progressive strengthening program.    2. Refer to occupational therapy to start aggressive motion with progressive strengthening.  3. Start doing home exercises such as Codman exercises, wall walking exercises, towel exercises, cane exercises, and pulley exercises.  She understands she should try to get aggressive with her motion but be progressive with her strengthening and try to strengthen her arm over the next 6 weeks.    4. Any pain can be treated with over-the-counter medications dosed per box instructions.  5. Continue with one-handed activities with the right hand only no lifting/pushing/pulling/climbing requiring the use of the left hand.    6. A prescription for the patient to obtain a pulley was given to her.    7. Follow up in 6 weeks for re-evaluation.

## 2023-05-01 ENCOUNTER — CLINICAL SUPPORT (OUTPATIENT)
Dept: REHABILITATION | Facility: HOSPITAL | Age: 53
End: 2023-05-01
Attending: ORTHOPAEDIC SURGERY
Payer: MEDICAID

## 2023-05-01 DIAGNOSIS — Z98.890 S/P ARTHROSCOPY OF LEFT SHOULDER: ICD-10-CM

## 2023-05-01 DIAGNOSIS — R29.898 IMPAIRED STRENGTH OF SHOULDER MUSCLES: ICD-10-CM

## 2023-05-01 DIAGNOSIS — G89.29 CHRONIC LEFT SHOULDER PAIN: ICD-10-CM

## 2023-05-01 DIAGNOSIS — M25.522 ELBOW PAIN, LEFT: ICD-10-CM

## 2023-05-01 DIAGNOSIS — M25.512 CHRONIC LEFT SHOULDER PAIN: ICD-10-CM

## 2023-05-01 DIAGNOSIS — M25.612 DECREASED ROM OF LEFT SHOULDER: ICD-10-CM

## 2023-05-01 DIAGNOSIS — M54.2 MUSCLE PAIN, CERVICAL: ICD-10-CM

## 2023-05-01 PROCEDURE — 97161 PT EVAL LOW COMPLEX 20 MIN: CPT | Mod: PN

## 2023-05-01 PROCEDURE — 97110 THERAPEUTIC EXERCISES: CPT | Mod: PN

## 2023-05-01 NOTE — PROGRESS NOTES
[facility]  Physical Therapy Initial Evaluation    Name: Nely Salas  Clinic Number: [id number]    Therapy Diagnosis:   Encounter Diagnoses   Name Primary?    S/P arthroscopy of left shoulder     Chronic left shoulder pain     Muscle pain, cervical     Elbow pain, left     Decreased ROM of left shoulder     Impaired strength of shoulder muscles      Physician: Wiliam Katz DO    Physician Orders: PT Eval and Treat   Medical Diagnosis from Referral: Z98.890 (ICD-10-CM) - S/P arthroscopy of left shoulder  Evaluation Date: 2023  Authorization Period Expiration: 24  Plan of Care Expiration: 23  Visit # / Visits authorized:   FOTO Visit #:  1/3    Time In: 8:00 am  Time Out: 9:00 am  Total Appointment Time (timed & untimed codes): 60 minutes    Precautions: Standard    Subjective     History of current condition - Nely reports: S/P arthroscopy of her left shoulder with labrum debridement rotator cuff repair, biceps tenodesis, subacromial bursectomy, and distal clavicle resection.  Her date of surgery 2023.  She states she is doing well but does have some pain in her shoulder.  Her shoulder pain originally began approximately 1 year ago after she was involved in a [employer] . Pt reports that she did not wear her sling as prescribed post surgery 2/2 to discomfort and did not inform Surgeon.     Medical History:   Past Medical History:   Diagnosis Date    Anemia, unspecified     Diabetes mellitus, type 2     Diabetic neuropathy     GERD (gastroesophageal reflux disease)     Hypertension     Sleep apnea        Surgical History:   Nely Salas  has a past surgical history that includes Cholecystectomy; Fallopian tuboplasty;  section; Trigger finger release (Left, 2022); Shoulder arthroscopy (Left, 3/13/2023); Arthroscopy of shoulder with removal of distal clavicle (Left, 3/13/2023); Arthroscopy of shoulder with decompression of subacromial  space (Left, 3/13/2023); and arthroscopy,shoulder,with biceps tenodesis (Left, 3/13/2023).    Medications:   Nely has a current medication list which includes the following prescription(s): cetirizine, easy touch twist lancets, esomeprazole, lisinopril, naloxone, oxycodone, pregabalin, promethazine, trazodone, and zolpidem, and the following Facility-Administered Medications: iron dextran (INFED) 200 mg in sodium chloride 0.9% 250 mL IVPB.    Allergies:   Review of patient's allergies indicates:   Allergen Reactions    Bactrim [sulfamethoxazole-trimethoprim] Nausea And Vomiting    Zithromax [azithromycin] Nausea And Vomiting    Clotrimazole-betamethasone Other (See Comments)     Makes patient's feet red and cracks the skin more    Ketorolac Other (See Comments)     PT STATES IT DOES NOT WORK FOR HER    Morphine Hives    Potassium clavulanate      Other reaction(s): Unknown    Sulfamethoxazole Other (See Comments)    Trimethoprim Other (See Comments)    Amoxicillin trihydrate Nausea Only and Other (See Comments)    Sulfa (sulfonamide antibiotics) Nausea And Vomiting        Imaging, CT Left shoulder    Noncontrast CT images demonstrate mild edema in the subcutaneous fat at the superior aspect of the shoulder, superior to the AC joint.  There is no focal fluid collection.  There are postoperative changes at the AC joint related to distal clavicle/acromion resection and subacromial bursectomy.  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     There is a probable small shoulder joint effusion, and there is edema in the rotator cuff musculature as would be expected postoperatively.  The rotator cuff would be better assessed with MRI.  There is no evidence of acute bony abnormality at the shoulder.  There is no dislocation of the glenohumeral joint.  There is a solitary tiny intra-articular calcific density at the inferior glenohumeral joint.  There are no CT findings to indicate osteomyelitis  "post operatively.     Impression:     No emergent findings at the left shoulder.  Specifically, no CT evidence of osteomyelitis or abscess.  No fracture.  Postsurgical changes, status post recent rotator cuff repair, subacromial bursectomy, distal clavicle/acromion resection.  Follow-up outpatient evaluation with MRI can be performed if symptoms persist.    Prior Therapy: pt has received skilled OT services for trigger finger in this clinic and skilled PT services in and outside facility for her left shoulder  Social History:  lives with their family  Occupation: Unemployed- pt receives disability benefits  Prior Level of Function: pt was able to utilize left UE in modification and required frequent rest breaks  Current Level of Function: pt reports no significant improvements in left UE since the surgery reporting continued swelling and pain. Limited use of Left UE.    Pain:  Current 5/10, worst 7/10, best 5/10   Location: left Shoulder (Front of shld, superior biceps region and inside of upper arm)  Description: Aching and Dull  Aggravating Factors: Moving shoulder and elbow  Easing Factors: massage, pain medication, ice, and rest    Pts goals: " Get my arm back right with no pain and swelling". " Be able to sweep and clean up without pain"    Objective     Posture:     -       Rounded shoulders  -       Forward head  -       Affected scapula abducted    Shoulder Range of Motion:   Left active Left Passive   Flexion 155 155   Abduction 90 deg with shld hiking 100   Extension DNP dnp   Ext. Rotation 52 52   Int. Rotation [facility] dnp   Elbow Flexion WNL --   Elbow Extension WNL          Upper Extremity Strength   Left   Shoulder flexion: 3-/5   Shoulder Abduction: 3-/5   Shoulder ER 3-/5   Shoulder IR --   Wrist flexion 5/5   Wrist ext 5/5   Elbow flexion 4-/5   Elbow Ext 4-/5         Joint mobility: restricted    Palpation:moderate tenderness to palpation at left Anterior shoulder, UT and biceps    Sensation: " Occasional numbness and tingling in the hand    Flexibility:     Upper Trap = L moderate restriction   Scalenes:  L minimal restriction   SCM:  L moderate restriction   Levator Scap: L moderate restriction    Scapular Control/Dyskinesis:    Normal / Subtle / Obvious    Left  Obvious       PT Evaluation Completed? Yes  Discussed Plan of Care with patient: Yes       Limitation/Restriction for FOTO Shoulder  Survey    Therapist reviewed FOTO scores for Nely Salas on 5/1/2023.   FOTO documents entered into [facility] - see Media section.    Limitation Score: 45%         TREATMENT   Treatment Time In: 8:40 am   Treatment Time Out: 9:00 am   Total Treatment time (time-based codes) separate from Evaluation: 20 minutes    Nely received the treatments listed below:    THERAPEUTIC EXERCISES to develop ROM for 10 minutes including   Cervical Flexion x 10   Cervical Rotation x 10   Left shoulder Wall slides x 15  Codmans reviewed- difficulty grasping concept of body momentum  HEP Provided and reviewed    Unattended Estim x 10 mins with Cold pack left shoulder       Home Exercises and Patient Education Provided    Education provided:   - Goals/POC  -Avoiding IR at this time    Written Home Exercises Provided: yes.  Exercises were reviewed and Nely was able to demonstrate them prior to the end of the session.  Nely demonstrated fair  understanding of the education provided.     See EMR under Media for exercises provided 5/1/2023.    Assessment   Nely is a 53 y.o. female referred to outpatient Physical Therapy with a medical diagnosis of Z98.890 (ICD-10-CM) - S/P arthroscopy of left shoulder. Pt presents with Left shoulder, elbow and wrist pain, Left cervical pain and muscular tightness, Decreased ROM Left shoulder, Reduced strength of the left shoulder and periscapular musculature, reduced scapular stability.    Pt prognosis is Good.   Pt will benefit from skilled outpatient Physical Therapy to address the deficits  "stated above and in the chart below, provide pt/family education, and to maximize pt's level of independence.     Plan of care discussed with patient: Yes  Pt's spiritual, cultural and educational needs considered and patient is agreeable to the plan of care and goals as stated below:     Anticipated Barriers for therapy: None    Medical Necessity is demonstrated by the following  History  Co-morbidities and personal factors that may impact the plan of care Co-morbidities:   See Hx    Personal Factors:   no deficits     moderate   Examination  Body Structures and Functions, activity limitations and participation restrictions that may impact the plan of care Body Regions:   neck  upper extremities    Body Systems:    ROM  strength    Participation Restrictions:   None  Recommendations /Instructions by Referring Surgeon: "Start doing home exercises such as Codman exercises, wall walking exercises, towel exercises, cane exercises, and pulley exercises.  She understands she should try to get aggressive with her motion but be progressive with her strengthening and try to strengthen her arm over the next 6 weeks.    4. Any pain can be treated with over-the-counter medications dosed per box instructions.  5. Continue with one-handed activities with the right hand only no lifting/pushing/pulling/climbing requiring the use of the left hand.  "- Note 4/26/23    Activity limitations:   Learning and applying knowledge  no deficits    General Tasks and Commands  no deficits    Communication  no deficits    Mobility  lifting and carrying objects  walking  driving (bike, car, motorcycle)    Self care  no deficits    Domestic Life  cooking  doing house work (cleaning house, washing dishes, laundry)    Interactions/Relationships  no deficits    Life Areas  no deficits    Community and Social Life  community life  recreation and leisure         low   Clinical Presentation stable and uncomplicated low   Decision Making/ Complexity " "Score: low     Goals:    Short Term Goals: 3 weeks   Report decreased left shld pain  < / =  4/10 "At Worst" to increase tolerance for ADLs  2. Pt will present with minimal restrictions left cervical musculature improving flexibility and decreasing tension reducing cervical discomfort.  4. Pt to tolerate HEP to improve ROM and independence with ADL's    Long Term Goals: 6 weeks   Report decreased left shld pain  < / =  2/10 "At Worst" to increase tolerance for ADLs  2.improved limitation score to 37 % or less  3. Independent with HEP for continued improvement in ROM/Strength.  4. Strength: Improve  left UE strength by 1 grade  5. Pt will demonstrate AROM left shld WNL      Plan   Plan of care Certification: 5/1/2023 to 7/30/23.    Outpatient Physical Therapy 2 times weekly for 6 weeks to include the following interventions: Electrical Stimulation IFC, Manual Therapy, Moist Heat/ Ice, Neuromuscular Re-ed, Therapeutic Activities, and Therapeutic Exercise. Other modalities as appropriate.    Mamta Escoto, PT     "

## 2023-05-01 NOTE — PLAN OF CARE
[facility]  Physical Therapy Initial Evaluation     Name: Nely Salas  Clinic Number: [id number]     Therapy Diagnosis:   Encounter Diagnoses   Name Primary?    S/P arthroscopy of left shoulder     Chronic left shoulder pain     Muscle pain, cervical     Elbow pain, left     Decreased ROM of left shoulder     Impaired strength of shoulder muscles       Physician: Wiliam Katz DO     Physician Orders: PT Eval and Treat   Medical Diagnosis from Referral: Z98.890 (ICD-10-CM) - S/P arthroscopy of left shoulder  Evaluation Date: 2023  Authorization Period Expiration: 24  Plan of Care Expiration: 23  Visit # / Visits authorized:   FOTO Visit #:  1/3     Time In: 8:00 am  Time Out: 9:00 am  Total Appointment Time (timed & untimed codes): 60 minutes     Precautions: Standard     Subjective      History of current condition - Nely reports: S/P arthroscopy of her left shoulder with labrum debridement rotator cuff repair, biceps tenodesis, subacromial bursectomy, and distal clavicle resection.  Her date of surgery 2023.  She states she is doing well but does have some pain in her shoulder.  Her shoulder pain originally began approximately 1 year ago after she was involved in a [employer] . Pt reports that she did not wear her sling as prescribed post surgery 2/2 to discomfort and did not inform Surgeon.     Medical History:        Past Medical History:   Diagnosis Date    Anemia, unspecified      Diabetes mellitus, type 2      Diabetic neuropathy      GERD (gastroesophageal reflux disease)      Hypertension      Sleep apnea           Surgical History:   Nely Salas  has a past surgical history that includes Cholecystectomy; Fallopian tuboplasty;  section; Trigger finger release (Left, 2022); Shoulder arthroscopy (Left, 3/13/2023); Arthroscopy of shoulder with removal of distal clavicle (Left, 3/13/2023); Arthroscopy of shoulder with  decompression of subacromial space (Left, 3/13/2023); and arthroscopy,shoulder,with biceps tenodesis (Left, 3/13/2023).     Medications:   Nely has a current medication list which includes the following prescription(s): cetirizine, easy touch twist lancets, esomeprazole, lisinopril, naloxone, oxycodone, pregabalin, promethazine, trazodone, and zolpidem, and the following Facility-Administered Medications: iron dextran (INFED) 200 mg in sodium chloride 0.9% 250 mL IVPB.     Allergies:         Review of patient's allergies indicates:   Allergen Reactions    Bactrim [sulfamethoxazole-trimethoprim] Nausea And Vomiting    Zithromax [azithromycin] Nausea And Vomiting    Clotrimazole-betamethasone Other (See Comments)       Makes patient's feet red and cracks the skin more    Ketorolac Other (See Comments)       PT STATES IT DOES NOT WORK FOR HER    Morphine Hives    Potassium clavulanate         Other reaction(s): Unknown    Sulfamethoxazole Other (See Comments)    Trimethoprim Other (See Comments)    Amoxicillin trihydrate Nausea Only and Other (See Comments)    Sulfa (sulfonamide antibiotics) Nausea And Vomiting         Imaging, CT Left shoulder     Noncontrast CT images demonstrate mild edema in the subcutaneous fat at the superior aspect of the shoulder, superior to the AC joint.  There is no focal fluid collection.  There are postoperative changes at the AC joint related to distal clavicle/acromion resection and subacromial bursectomy.  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     There is a probable small shoulder joint effusion, and there is edema in the rotator cuff musculature as would be expected postoperatively.  The rotator cuff would be better assessed with MRI.  There is no evidence of acute bony abnormality at the shoulder.  There is no dislocation of the glenohumeral joint.  There is a solitary tiny intra-articular calcific density at the inferior glenohumeral joint.  There  "are no CT findings to indicate osteomyelitis post operatively.     Impression:     No emergent findings at the left shoulder.  Specifically, no CT evidence of osteomyelitis or abscess.  No fracture.  Postsurgical changes, status post recent rotator cuff repair, subacromial bursectomy, distal clavicle/acromion resection.  Follow-up outpatient evaluation with MRI can be performed if symptoms persist.     Prior Therapy: pt has received skilled OT services for trigger finger in this clinic and skilled PT services in and outside facility for her left shoulder  Social History:  lives with their family  Occupation: Unemployed- pt receives disability benefits  Prior Level of Function: pt was able to utilize left UE in modification and required frequent rest breaks  Current Level of Function: pt reports no significant improvements in left UE since the surgery reporting continued swelling and pain. Limited use of Left UE.     Pain:  Current 5/10, worst 7/10, best 5/10   Location: left Shoulder (Front of shld, superior biceps region and inside of upper arm)  Description: Aching and Dull  Aggravating Factors: Moving shoulder and elbow  Easing Factors: massage, pain medication, ice, and rest     Pts goals: " Get my arm back right with no pain and swelling". " Be able to sweep and clean up without pain"     Objective      Posture:     -       Rounded shoulders  -       Forward head  -       Affected scapula abducted     Shoulder Range of Motion:    Left active Left Passive   Flexion 155 155   Abduction 90 deg with shld hiking 100   Extension DNP dnp   Ext. Rotation 52 52   Int. Rotation [facility] dnp   Elbow Flexion WNL --   Elbow Extension WNL              Upper Extremity Strength    Left   Shoulder flexion: 3-/5   Shoulder Abduction: 3-/5   Shoulder ER 3-/5   Shoulder IR --   Wrist flexion 5/5   Wrist ext 5/5   Elbow flexion 4-/5   Elbow Ext 4-/5            Joint mobility: restricted     Palpation:moderate tenderness to palpation at " left Anterior shoulder, UT and biceps     Sensation: Occasional numbness and tingling in the hand     Flexibility:                Upper Trap = L moderate restriction              Scalenes:  L minimal restriction              SCM:  L moderate restriction              Levator Scap: L moderate restriction     Scapular Control/Dyskinesis:     Normal / Subtle / Obvious    Left  Obvious         PT Evaluation Completed? Yes  Discussed Plan of Care with patient: Yes         Limitation/Restriction for FOTO Shoulder  Survey     Therapist reviewed FOTO scores for Nely Salas on 5/1/2023.   FOTO documents entered into [facility] - see Media section.     Limitation Score: 45%            TREATMENT   Treatment Time In: 8:40 am   Treatment Time Out: 9:00 am   Total Treatment time (time-based codes) separate from Evaluation: 20 minutes     Nely received the treatments listed below:    THERAPEUTIC EXERCISES to develop ROM for 10 minutes including   Cervical Flexion x 10   Cervical Rotation x 10   Left shoulder Wall slides x 15  Codmans reviewed- difficulty grasping concept of body momentum  HEP Provided and reviewed     Unattended Estim x 10 mins with Cold pack left shoulder         Home Exercises and Patient Education Provided     Education provided:   - Goals/POC  -Avoiding IR at this time     Written Home Exercises Provided: yes.  Exercises were reviewed and Nely was able to demonstrate them prior to the end of the session.  Nely demonstrated fair  understanding of the education provided.      See EMR under Media for exercises provided 5/1/2023.     Assessment   Nely is a 53 y.o. female referred to outpatient Physical Therapy with a medical diagnosis of Z98.890 (ICD-10-CM) - S/P arthroscopy of left shoulder. Pt presents with Left shoulder, elbow and wrist pain, Left cervical pain and muscular tightness, Decreased ROM Left shoulder, Reduced strength of the left shoulder and periscapular musculature, reduced scapular  "stability.     Pt prognosis is Good.   Pt will benefit from skilled outpatient Physical Therapy to address the deficits stated above and in the chart below, provide pt/family education, and to maximize pt's level of independence.      Plan of care discussed with patient: Yes  Pt's spiritual, cultural and educational needs considered and patient is agreeable to the plan of care and goals as stated below:      Anticipated Barriers for therapy: None     Medical Necessity is demonstrated by the following  History  Co-morbidities and personal factors that may impact the plan of care Co-morbidities:   See Hx     Personal Factors:   no deficits       moderate   Examination  Body Structures and Functions, activity limitations and participation restrictions that may impact the plan of care Body Regions:   neck  upper extremities     Body Systems:    ROM  strength     Participation Restrictions:   None  Recommendations /Instructions by Referring Surgeon: "Start doing home exercises such as Codman exercises, wall walking exercises, towel exercises, cane exercises, and pulley exercises.  She understands she should try to get aggressive with her motion but be progressive with her strengthening and try to strengthen her arm over the next 6 weeks.    4. Any pain can be treated with over-the-counter medications dosed per box instructions.  5. Continue with one-handed activities with the right hand only no lifting/pushing/pulling/climbing requiring the use of the left hand.  "- Note 4/26/23     Activity limitations:   Learning and applying knowledge  no deficits     General Tasks and Commands  no deficits     Communication  no deficits     Mobility  lifting and carrying objects  walking  driving (bike, car, motorcycle)     Self care  no deficits     Domestic Life  cooking  doing house work (cleaning house, washing dishes, laundry)     Interactions/Relationships  no deficits     Life Areas  no deficits     Community and Social " "Life  community life  recreation and leisure             low   Clinical Presentation stable and uncomplicated low   Decision Making/ Complexity Score: low      Goals:     Short Term Goals: 3 weeks   Report decreased left shld pain  < / =  4/10 "At Worst" to increase tolerance for ADLs  2. Pt will present with minimal restrictions left cervical musculature improving flexibility and decreasing tension reducing cervical discomfort.  4. Pt to tolerate HEP to improve ROM and independence with ADL's     Long Term Goals: 6 weeks   Report decreased left shld pain  < / =  2/10 "At Worst" to increase tolerance for ADLs  2.improved limitation score to 37 % or less  3. Independent with HEP for continued improvement in ROM/Strength.  4. Strength: Improve  left UE strength by 1 grade  5. Pt will demonstrate AROM left shld WNL        Plan   Plan of care Certification: 5/1/2023 to 7/30/23.     Outpatient Physical Therapy 2 times weekly for 6 weeks to include the following interventions: Electrical Stimulation IFC, Manual Therapy, Moist Heat/ Ice, Neuromuscular Re-ed, Therapeutic Activities, and Therapeutic Exercise. Other modalities as appropriate.     Mamta Escoto, PT            "

## 2023-05-01 NOTE — PROGRESS NOTES
PT/PTA met face to face to discuss pt's treatment plan and progress towards established goals. Pt will be seen by a physical therapist minimally every 6th visit or every 30 days.    Please see Updated Plan of Care dated 5/1/23 for  goals.    S/P arthroscopy of her left shoulder with labrum debridement ,rotator cuff repair, biceps tenodesis, subacromial bursectomy, and distal clavicle resection 03/13/2023. (7 WEEKS POST OP). Surgeon recommendations for aggressive motion with progressive strengthening program.  Begin with AAROM and Progress to AROM as tolerated. Perform PROM pain free.    Mamta Escoto, PT

## 2023-05-08 PROBLEM — B37.2 CUTANEOUS CANDIDIASIS: Status: ACTIVE | Noted: 2023-05-08

## 2023-05-08 PROBLEM — B37.31 RECURRENT CANDIDIASIS OF VAGINA: Status: ACTIVE | Noted: 2023-05-08

## 2023-05-09 ENCOUNTER — CLINICAL SUPPORT (OUTPATIENT)
Dept: REHABILITATION | Facility: HOSPITAL | Age: 53
End: 2023-05-09
Attending: ORTHOPAEDIC SURGERY
Payer: MEDICAID

## 2023-05-09 DIAGNOSIS — R29.898 IMPAIRED STRENGTH OF SHOULDER MUSCLES: ICD-10-CM

## 2023-05-09 DIAGNOSIS — M25.612 DECREASED ROM OF LEFT SHOULDER: ICD-10-CM

## 2023-05-09 DIAGNOSIS — G89.29 CHRONIC LEFT SHOULDER PAIN: Primary | ICD-10-CM

## 2023-05-09 DIAGNOSIS — M54.2 MUSCLE PAIN, CERVICAL: ICD-10-CM

## 2023-05-09 DIAGNOSIS — M25.512 CHRONIC LEFT SHOULDER PAIN: Primary | ICD-10-CM

## 2023-05-09 DIAGNOSIS — M25.522 ELBOW PAIN, LEFT: ICD-10-CM

## 2023-05-09 PROCEDURE — 97014 ELECTRIC STIMULATION THERAPY: CPT | Mod: PN

## 2023-05-09 PROCEDURE — 97110 THERAPEUTIC EXERCISES: CPT | Mod: PN

## 2023-05-09 NOTE — PROGRESS NOTES
[facility]   Physical Therapy Treatment Note      Name: Nely Salas  Clinic Number: [id number]    Therapy Diagnosis:   Encounter Diagnoses   Name Primary?    Chronic left shoulder pain Yes    Muscle pain, cervical     Elbow pain, left     Decreased ROM of left shoulder     Impaired strength of shoulder muscles      Physician: Wiliam Katz DO    Visit Date: 5/9/2023    Physician Orders: PT Eval and Treat   Medical Diagnosis from Referral: Z98.890 (ICD-10-CM) - S/P arthroscopy of left shoulder  Evaluation Date: 5/1/2023  Authorization Period Expiration: 5/1/24  Plan of Care Expiration: 7/30/23  Visit # / Visits authorized: 1/ 12  FOTO Visit #:  1/3    PTA Visit #: 0/5     Time In: 10:00 AM   Time Out: 11:55 am  Total Billable Time: 53  minutes    Sx:S/P arthroscopy of her left shoulder with labrum debridement rotator cuff repair, biceps tenodesis, subacromial bursectomy, and distal clavicle resection. Date of surgery 03/13/2023.         Subjective     Pt reports: No significant changes except for shoulder blade pain. Pt reports that her right elbow and shoulder are starting to bother her.  She was compliant with home exercise program.  Response to previous treatment:   Functional change: --    Pain: 6/10  Location: left Shoulder and scapular region      Objective      Objective Measures updated at progress report unless specified.     Treatment     Nely received the treatments listed below:      therapeutic exercises to develop ROM, flexibility, and posture for 43 minutes including:  AAROM-Pulleys x 3 mins in scaption PAIN FREE  Left UT stretch 2 x 30 sec  Left Levator 2 x 30 sec   Seated Cervical Flexion x 15  Seated Cervical Rotation x 15   Chin tucks x 20   Wall Slides : Flexion x 20 ; Abduction (pain free range) x 20   Supine AAROM Flexion with dowel to 90 deg  x 15  Supine AAROM ER to approx 50 deg d with dowel  (shld 0 deg abd) x 15  Seated Scapular retractions x15   PROM  "performed to the left   - Flexion up to 150 deg  -Abd to 90 deg   -ER to approx 50 deg      supervised modalities after being cleared for contradictions: IFC Electrical Stimulation:  Nely received IFC Electrical Stimulation for pain control applied to the Left shoulder. Pt received stimulation  for 10 minutes. Nely tolderated treatment well without any adverse effects.     cold pack for left shld for 10 minutes with Estim  Patient Education and Home Exercises       Education provided:   - Medical store for pulley to fill order  Written Home Exercises Provided: Patient instructed to cont prior HEP. Exercises were reviewed and Nely was able to demonstrate them prior to the end of the session.  Nely demonstrated good  understanding of the education provided. See EMR under Patient Instructions for exercises provided during therapy sessions    Assessment     8 weeks post OP. Today's activities focused on AAROM of the shld, posture and cervical flexibility. Max cues required for correct form. Instructions provided to remain in pain free range. Pt limited by pain. Over all good tolerance to today's session.    Nely Is progressing well towards her goals.   Pt prognosis is Good.     Pt will continue to benefit from skilled outpatient physical therapy to address the deficits listed in the problem list box on initial evaluation, provide pt/family education and to maximize pt's level of independence in the home and community environment.     Pt's spiritual, cultural and educational needs considered and pt agreeable to plan of care and goals.     Anticipated barriers to physical therapy: None    Goals: Short Term Goals: 3 weeks   Report decreased left shld pain  < / =  4/10 "At Worst" to increase tolerance for ADLs  2. Pt will present with minimal restrictions left cervical musculature improving flexibility and decreasing tension reducing cervical discomfort.  4. Pt to tolerate HEP to improve ROM and independence " "with ADL's     Long Term Goals: 6 weeks   Report decreased left shld pain  < / =  2/10 "At Worst" to increase tolerance for ADLs  2.improved limitation score to 37 % or less  3. Independent with HEP for continued improvement in ROM/Strength.  4. Strength: Improve  left UE strength by 1 grade  5. Pt will demonstrate AROM left shld WNL    Plan   Post capsule stretching,submaximal elbow isometrics, additional scapular strengthening   Plan of care Certification: 5/1/2023 to 7/30/23.     Outpatient Physical Therapy 2 times weekly for 6 weeks to include the following interventions: Electrical Stimulation IFC, Manual Therapy, Moist Heat/ Ice, Neuromuscular Re-ed, Therapeutic Activities, and Therapeutic Exercise. Other modalities as appropriate.    Mamta Escoto, PT      "

## 2023-05-12 ENCOUNTER — CLINICAL SUPPORT (OUTPATIENT)
Dept: REHABILITATION | Facility: HOSPITAL | Age: 53
End: 2023-05-12
Attending: ORTHOPAEDIC SURGERY
Payer: MEDICAID

## 2023-05-12 DIAGNOSIS — G89.29 CHRONIC LEFT SHOULDER PAIN: Primary | ICD-10-CM

## 2023-05-12 DIAGNOSIS — M54.2 MUSCLE PAIN, CERVICAL: ICD-10-CM

## 2023-05-12 DIAGNOSIS — M25.522 ELBOW PAIN, LEFT: ICD-10-CM

## 2023-05-12 DIAGNOSIS — M25.512 CHRONIC LEFT SHOULDER PAIN: Primary | ICD-10-CM

## 2023-05-12 DIAGNOSIS — M25.612 DECREASED ROM OF LEFT SHOULDER: ICD-10-CM

## 2023-05-12 DIAGNOSIS — R29.898 IMPAIRED STRENGTH OF SHOULDER MUSCLES: ICD-10-CM

## 2023-05-12 PROCEDURE — 97110 THERAPEUTIC EXERCISES: CPT | Mod: PN,CQ

## 2023-05-12 PROCEDURE — 97014 ELECTRIC STIMULATION THERAPY: CPT | Mod: PN,CQ

## 2023-05-12 NOTE — PROGRESS NOTES
[facility]   Physical Therapy Treatment Note      Name: Nely Salas  Clinic Number: [id number]    Therapy Diagnosis:   Encounter Diagnoses   Name Primary?    Chronic left shoulder pain Yes    Muscle pain, cervical     Elbow pain, left     Decreased ROM of left shoulder     Impaired strength of shoulder muscles      Physician: Wiliam Katz DO    Visit Date: 5/12/2023    Physician Orders: PT Eval and Treat   Medical Diagnosis from Referral: Z98.890 (ICD-10-CM) - S/P arthroscopy of left shoulder  Evaluation Date: 5/1/2023  Authorization Period Expiration: 5/1/24  Plan of Care Expiration: 7/30/23  Visit # / Visits authorized: 1/ 12  FOTO Visit #:  1/3    PTA Visit #: 0/5     Time In: 12:25 pm  Time Out: 1:20 pm  Total Billable Time: 53  minutes    Sx:S/P arthroscopy of her left shoulder with labrum debridement rotator cuff repair, biceps tenodesis, subacromial bursectomy, and distal clavicle resection. Date of surgery 03/13/2023.         Subjective     Pt reports: feeling better today.  She was compliant with home exercise program.  Response to previous treatment:   Functional change: --    Pain: 3/10  Location: left Shoulder and scapular region      Objective      Objective Measures updated at progress report unless specified.     Treatment     Nely received the treatments listed below:      therapeutic exercises to develop ROM, flexibility, and posture for 43 minutes including:  AAROM-Pulleys x 3 mins in scaption PAIN FREE  Left UT stretch 2 x 30 sec  Left Levator 2 x 30 sec   Seated Cervical Flexion x 15  Seated Cervical Rotation x 15   Chin tucks x 20   Wall Slides : Flexion x 20 ; Abduction (pain free range) x 20   Supine AAROM Flexion with dowel to 90 deg  x 15  Supine AAROM ER to approx 50 deg d with dowel  (shld 0 deg abd) x 15  Seated Scapular retractions x15   PROM performed to the left   - Flexion up to 150 deg  -Abd to 90 deg   -ER to approx 50 deg      supervised  "modalities after being cleared for contradictions: IFC Electrical Stimulation:  Nely received IFC Electrical Stimulation for pain control applied to the Left shoulder. Pt received stimulation  for 10 minutes. Nely tolderated treatment well without any adverse effects.     cold pack for left shld for 10 minutes with Estim  Patient Education and Home Exercises       Education provided:   - Medical store for pulley to fill order  Written Home Exercises Provided: Patient instructed to cont prior HEP. Exercises were reviewed and Nely was able to demonstrate them prior to the end of the session.  Nely demonstrated good  understanding of the education provided. See EMR under Patient Instructions for exercises provided during therapy sessions    Assessment     8 weeks post OP. Today's activities focused on AAROM of the shld, posture and cervical flexibility. Max cues required for correct form. Instructions provided to remain in pain free range. Pt limited by pain. Over all good tolerance to today's session.    Nely Is progressing well towards her goals.   Pt prognosis is Good.     Pt will continue to benefit from skilled outpatient physical therapy to address the deficits listed in the problem list box on initial evaluation, provide pt/family education and to maximize pt's level of independence in the home and community environment.     Pt's spiritual, cultural and educational needs considered and pt agreeable to plan of care and goals.     Anticipated barriers to physical therapy: None    Goals: Short Term Goals: 3 weeks   Report decreased left shld pain  < / =  4/10 "At Worst" to increase tolerance for ADLs  2. Pt will present with minimal restrictions left cervical musculature improving flexibility and decreasing tension reducing cervical discomfort.  4. Pt to tolerate HEP to improve ROM and independence with ADL's     Long Term Goals: 6 weeks   Report decreased left shld pain  < / =  2/10 "At Worst" to " increase tolerance for ADLs  2.improved limitation score to 37 % or less  3. Independent with HEP for continued improvement in ROM/Strength.  4. Strength: Improve  left UE strength by 1 grade  5. Pt will demonstrate AROM left shld WNL    Plan   Post capsule stretching,submaximal elbow isometrics, additional scapular strengthening   Plan of care Certification: 5/1/2023 to 7/30/23.     Outpatient Physical Therapy 2 times weekly for 6 weeks to include the following interventions: Electrical Stimulation IFC, Manual Therapy, Moist Heat/ Ice, Neuromuscular Re-ed, Therapeutic Activities, and Therapeutic Exercise. Other modalities as appropriate.    Cuate Landers, PTA

## 2023-05-16 ENCOUNTER — CLINICAL SUPPORT (OUTPATIENT)
Dept: REHABILITATION | Facility: HOSPITAL | Age: 53
End: 2023-05-16
Attending: ORTHOPAEDIC SURGERY
Payer: MEDICAID

## 2023-05-16 PROCEDURE — 97014 ELECTRIC STIMULATION THERAPY: CPT | Mod: PN

## 2023-05-16 PROCEDURE — 97110 THERAPEUTIC EXERCISES: CPT | Mod: PN

## 2023-05-16 NOTE — PROGRESS NOTES
[facility]   Physical Therapy Treatment Note      Name: Nely Salas  Clinic Number: [id number]    Therapy Diagnosis:   No diagnosis found.    Physician: Wiliam Katz DO    Visit Date: 5/16/2023    Physician Orders: PT Eval and Treat   Medical Diagnosis from Referral: Z98.890 (ICD-10-CM) - S/P arthroscopy of left shoulder  Evaluation Date: 5/1/2023  Authorization Period Expiration: 5/1/24  Plan of Care Expiration: 7/30/23  Visit # / Visits authorized: 1/ 12  FOTO Visit #:  1/3    PTA Visit #: 0/5     Time In: 10:00 am  Time Out:11:00 am  Total Billable Time: 60  minutes    Sx:S/P arthroscopy of her left shoulder with labrum debridement rotator cuff repair, biceps tenodesis, subacromial bursectomy, and distal clavicle resection. Date of surgery 03/13/2023.     9 weeks post op  Subjective     Pt reports:  A little more pain today. Rough night. Pt not able to get pulleys. Medical store unable to order.  She was compliant with home exercise program.  Response to previous treatment:   Functional change: --    Pain: 4/10  Location: left Shoulder and scapular region      Objective      Objective Measures updated at progress report unless specified.     Treatment     Nely received the treatments listed below:      therapeutic exercises to develop ROM, flexibility, and posture for 45 minutes including:  AAROM-Pulleys x 5 mins in scaption PAIN FREE  Left UT stretch 2 x 30 sec  Left Levator 2 x 30 sec   Seated Cervical Flexion x 15  Seated Cervical Rotation x 15   Seated Shld shrugs x 20  Seated Scapular retractions  2 x 15   Chin tucks x 20   Wall Slides : Flexion x 20 ; Abduction (pain free range) x 20   Supine Serratus Punch x 15   Supine AAROM Flexion with dowel to 90 deg  2 x 15  Supine AAROM ER to approx 50 deg d with dowel  (shld 0 deg abd) 2 x 15  Prone Rows     PROM performed to the left   - Flexion up to 155 deg  -Abd to 100 deg   -ER to approx 60 deg      supervised modalities  "after being cleared for contradictions: IFC Electrical Stimulation:  Nely received IFC Electrical Stimulation for pain control applied to the Left shoulder. Pt received stimulation  for 15 minutes. Nely tolderated treatment well without any adverse effects.     cold pack for left shld for 10 minutes with Estim  Patient Education and Home Exercises       Education provided:   - Medical store for pulley to fill order  Written Home Exercises Provided: Patient instructed to cont prior HEP. Exercises were reviewed and Nely was able to demonstrate them prior to the end of the session.  Nely demonstrated good  understanding of the education provided. See EMR under Patient Instructions for exercises provided during therapy sessions    Assessment     9 weeks post OP. Today's activities focused on AAROM of the shld, posture and cervical flexibility.  Added Serratus punches for further scapular strengthening . Increased reps of activities as tolerated. Max cues required for correct form. Instructions provided to maintain range for minimal pain. Limited by back pain this date. Over all good tolerance to today's session.    Nely Is progressing well towards her goals.   Pt prognosis is Good.     Pt will continue to benefit from skilled outpatient physical therapy to address the deficits listed in the problem list box on initial evaluation, provide pt/family education and to maximize pt's level of independence in the home and community environment.     Pt's spiritual, cultural and educational needs considered and pt agreeable to plan of care and goals.     Anticipated barriers to physical therapy: None    Goals: Short Term Goals: 3 weeks   Report decreased left shld pain  < / =  4/10 "At Worst" to increase tolerance for ADLs  2. Pt will present with minimal restrictions left cervical musculature improving flexibility and decreasing tension reducing cervical discomfort.  4. Pt to tolerate HEP to improve ROM and " "independence with ADL's     Long Term Goals: 6 weeks   Report decreased left shld pain  < / =  2/10 "At Worst" to increase tolerance for ADLs  2.improved limitation score to 37 % or less  3. Independent with HEP for continued improvement in ROM/Strength.  4. Strength: Improve  left UE strength by 1 grade  5. Pt will demonstrate AROM left shld WNL    Plan   Post capsule stretching,submaximal elbow isometrics, additional scapular strengthening   Plan of care Certification: 5/1/2023 to 7/30/23.     Outpatient Physical Therapy 2 times weekly for 6 weeks to include the following interventions: Electrical Stimulation IFC, Manual Therapy, Moist Heat/ Ice, Neuromuscular Re-ed, Therapeutic Activities, and Therapeutic Exercise. Other modalities as appropriate.    Mamta Escoto, PT          "

## 2023-05-23 ENCOUNTER — CLINICAL SUPPORT (OUTPATIENT)
Dept: REHABILITATION | Facility: HOSPITAL | Age: 53
End: 2023-05-23
Attending: ORTHOPAEDIC SURGERY
Payer: MEDICAID

## 2023-05-23 DIAGNOSIS — M25.612 DECREASED ROM OF LEFT SHOULDER: ICD-10-CM

## 2023-05-23 DIAGNOSIS — M54.2 MUSCLE PAIN, CERVICAL: ICD-10-CM

## 2023-05-23 DIAGNOSIS — G89.29 CHRONIC LEFT SHOULDER PAIN: Primary | ICD-10-CM

## 2023-05-23 DIAGNOSIS — M25.522 ELBOW PAIN, LEFT: ICD-10-CM

## 2023-05-23 DIAGNOSIS — M25.512 CHRONIC LEFT SHOULDER PAIN: Primary | ICD-10-CM

## 2023-05-23 DIAGNOSIS — R29.898 IMPAIRED STRENGTH OF SHOULDER MUSCLES: ICD-10-CM

## 2023-05-23 PROCEDURE — 97110 THERAPEUTIC EXERCISES: CPT | Mod: PN

## 2023-05-23 NOTE — PROGRESS NOTES
[facility]   Physical Therapy Treatment Note      Name: Nely Salas  Clinic Number: [id number]    Therapy Diagnosis:   Encounter Diagnoses   Name Primary?    Chronic left shoulder pain Yes    Muscle pain, cervical     Elbow pain, left     Decreased ROM of left shoulder     Impaired strength of shoulder muscles      Physician: Wiliam Katz DO    Visit Date: 5/23/2023    Physician Orders: PT Eval and Treat   Medical Diagnosis from Referral: Z98.890 (ICD-10-CM) - S/P arthroscopy of left shoulder  Evaluation Date: 5/1/2023  Authorization Period Expiration: 5/1/24  Plan of Care Expiration: 7/30/23  Visit # / Visits authorized: 3 / 20 (Not including Initial Evaluation)  FOTO Visit #:  1/3    PTA Visit #: 0/5     Time In: 10:00 AM  Time Out: 10:55 AM  Total Billable Time: 53 minutes    Sx:S/P arthroscopy of her left shoulder with labrum debridement rotator cuff repair, biceps tenodesis, subacromial bursectomy, and distal clavicle resection. Date of surgery 03/13/2023.     10 weeks post op  Subjective     Pt reports: It's a little stiff. I feel it in the elbow real bad today.     She was compliant with home exercise program.  Response to previous treatment: Felt good after the last session.  Functional change: None reported.     Pain: 5/10  Location: left Shoulder and scapular region      Objective      Objective Measures updated at progress report unless specified.     Treatment     Nely received the treatments listed below:      Therapeutic Exercises: to develop ROM, flexibility, and posture for 53 minutes including:  AAROM-Pulleys x 5mins in scaption PAIN FREE  Left UT stretch 2 x 30 sec  Left Levator 2 x 30 sec   Seated Cervical Flexion x 15  Seated Cervical Rotation x 15   Seated Shld shrugs x 20  Seated Scapular retractions  2 x 15   Chin tucks x 20   Supine passive bicep stretch performed by PT 5 x 15sec  Supine L Elbow Flexion AROM neutral 5x, pronated 5x, supinated 5x  Supine L  Tricep Press, Yellow band, 15x   Supine L forearm pronation/supination 0# 15x  Supine AAROM Flexion with dowel to 90 deg  2 x 15  Supine L shoulder AAROM flexion with hands clasped 12x  Supine AAROM ER to approx 50 deg d with dowel  (shld 0 deg abd) 2 x 15  Supine L shoulder Int/Ext Rotation AROM 15x  Supine Serratus Punch with 2lb Dowel x 15   Supine L shoulder isometric extension into towel roll, 15x  Supine L shoulder isometric Int / Ext rotation, 15x each way  Supine L isometric elbow flexion / extension, 15x each way  Sidelying Passive Upper trap (L) stretch performed by PT 3 x 30sec  Sidelying Passive L scapular ranging in all directions performed by PT x 4mins  Sidelying L scapular retraction 20x  Sidelying L scapular depression 20x  Sidelying Passive Upper trap (L) stretch performed by PT 3 x 30sec  Sidelying L shoulder ext rotation 15x   Wall Slides : Flexion x 20 ; Abduction (pain free range) x 20   Prone Rows     PROM performed to the left   - Flexion up to 155 deg  - Abd to 100 deg   - ER to approx 60 deg      STM/DTM at L mid/posterior deltoid, proximal triceps and lateral border of scap  Strain Counter strain at the triceps and teres musculature  TrP Release at long head of tricep and teres musculature    Supervised Modalities after being cleared for contradictions: IFC Electrical Stimulation:  Nely received IFC Electrical Stimulation for pain control applied to the Left shoulder. Pt received stimulation for 0 minutes.   Nely tolderated treatment well without any adverse effects.     cold pack for left shld for 0 minutes with Estim    Patient Education and Home Exercises       Education provided: Therapeutic exercises.    Written Home Exercises Provided: Patient instructed to cont prior HEP. Exercises were reviewed and Nely was able to demonstrate them prior to the end of the session.  Nely demonstrated good  understanding of the education provided. See EMR under Patient Instructions for  "exercises provided during therapy sessions    Assessment     10 weeks post OP. Today's session continued with focus on ROM and incorporated interventions to reduce tissue tightness and alleviate pain at the elbow. Shoulder ROM remains limited with overhead motions. TTP noted at proximal triceps and increased tissue tightness grossly throughout posterior shoulder girdle. Patient reports decreased pain at session conclusion.     Nely Is progressing well towards her goals.   Pt prognosis is Good.     Pt will continue to benefit from skilled outpatient physical therapy to address the deficits listed in the problem list box on initial evaluation, provide pt/family education and to maximize pt's level of independence in the home and community environment.   Pt's spiritual, cultural and educational needs considered and pt agreeable to plan of care and goals.     Anticipated barriers to physical therapy: None    Goals: Short Term Goals: 3 weeks   Report decreased left shld pain  < / =  4/10 "At Worst" to increase tolerance for ADLs  2. Pt will present with minimal restrictions left cervical musculature improving flexibility and decreasing tension reducing cervical discomfort.  4. Pt to tolerate HEP to improve ROM and independence with ADL's     Long Term Goals: 6 weeks   Report decreased left shld pain  < / =  2/10 "At Worst" to increase tolerance for ADLs  2.improved limitation score to 37 % or less  3. Independent with HEP for continued improvement in ROM/Strength.  4. Strength: Improve  left UE strength by 1 grade  5. Pt will demonstrate AROM left shld WNL    Plan     Post capsule stretching,submaximal elbow isometrics, additional scapular strengthening     Plan of care Certification: 5/1/2023 to 7/30/23.     Outpatient Physical Therapy 2 times weekly for 6 weeks to include the following interventions: Electrical Stimulation IFC, Manual Therapy, Moist Heat/ Ice, Neuromuscular Re-ed, Therapeutic Activities, and " Therapeutic Exercise. Other modalities as appropriate.    Ritu Escoto, PT

## 2023-05-26 ENCOUNTER — CLINICAL SUPPORT (OUTPATIENT)
Dept: REHABILITATION | Facility: HOSPITAL | Age: 53
End: 2023-05-26
Attending: ORTHOPAEDIC SURGERY
Payer: MEDICAID

## 2023-05-26 DIAGNOSIS — M25.512 CHRONIC LEFT SHOULDER PAIN: Primary | ICD-10-CM

## 2023-05-26 DIAGNOSIS — R29.898 IMPAIRED STRENGTH OF SHOULDER MUSCLES: ICD-10-CM

## 2023-05-26 DIAGNOSIS — M54.2 MUSCLE PAIN, CERVICAL: ICD-10-CM

## 2023-05-26 DIAGNOSIS — M25.522 ELBOW PAIN, LEFT: ICD-10-CM

## 2023-05-26 DIAGNOSIS — M25.612 DECREASED ROM OF LEFT SHOULDER: ICD-10-CM

## 2023-05-26 DIAGNOSIS — G89.29 CHRONIC LEFT SHOULDER PAIN: Primary | ICD-10-CM

## 2023-05-26 PROCEDURE — 97110 THERAPEUTIC EXERCISES: CPT | Mod: PN

## 2023-05-26 NOTE — PROGRESS NOTES
[facility]   Physical Therapy Treatment Note      Name: Nely Salas  Clinic Number: [id number]    Therapy Diagnosis:   Encounter Diagnoses   Name Primary?    Chronic left shoulder pain Yes    Muscle pain, cervical     Elbow pain, left     Decreased ROM of left shoulder     Impaired strength of shoulder muscles      Physician: Wiliam Katz DO    Visit Date: 5/26/2023    Physician Orders: PT Eval and Treat   Medical Diagnosis from Referral: Z98.890 (ICD-10-CM) - S/P arthroscopy of left shoulder  Evaluation Date: 5/1/2023  Authorization Period Expiration: 5/1/24  Plan of Care Expiration: 7/30/23  Visit # / Visits authorized: 5/ 20 (Not including Initial Evaluation)  FOTO Visit #:  1/3    PTA Visit #: 0/5     Time In: 2:22 PM  Time Out: 3:15 PM  Total Billable Time: 53 minutes    Sx:S/P arthroscopy of her left shoulder with labrum debridement rotator cuff repair, biceps tenodesis, subacromial bursectomy, and distal clavicle resection. Date of surgery 03/13/2023.     10 weeks post op  Subjective     Pt reports: My shoulder was really sore after last session but then it felt better. I even played basketball.    She was compliant with home exercise program.  Response to previous treatment: Felt good after the last session.  Functional change: None reported.     Pain: 3/10  Location: left Shoulder and scapular region      Objective      Objective Measures updated at progress report unless specified.     Treatment     Nely received the treatments listed below:      Therapeutic Exercises: to develop ROM, flexibility, and posture for 53 minutes including:  AAROM-Pulleys x 5mins in scaption PAIN FREE  Left UT stretch 2 x 30 sec  Left Levator 2 x 30 sec   Seated Cervical Flexion x 15  Seated Cervical Rotation x 15   Seated Shld shrugs x 20  Seated Scapular retractions  2 x 15   Chin tucks x 20   Supine passive bicep stretch performed by PT 5 x 15sec  Supine L Elbow Flexion AROM neutral 5x,  pronated 5x, supinated 5x  Supine L Tricep Press, Yellow band, 15x   Supine L forearm pronation/supination 0# 15x  Supine AAROM Flexion with dowel to 90 deg  2 x 15  Supine L shoulder AAROM flexion with hands clasped 15 x  Supine AAROM ER to approx 50 deg d with dowel  (shld 0 deg abd) 2 x 15  Supine L shoulder Int/Ext Rotation AROM 15x  Supine Serratus Punch with 2lb Dowel x 15   Supine L shoulder isometric extension into towel roll, 15x- 5 sec each  Supine L shoulder isometric Int / Ext rotation, 15x each way  Supine L isometric elbow flexion / extension, 15x each way  Sidelying Passive Upper trap (L) stretch performed by PT 3 x 30sec  Sidelying Passive L scapular ranging in all directions performed by PT x 4mins  Sidelying L scapular retraction 20x  Sidelying L scapular depression 20x  Sidelying L shoulder ext rotation 15x   Wall Slides : Flexion x 20 ; Abduction (pain free range) x 20   Prone Rows x 10  Prone Extension x 10   Prone Shoulder ABD x 10   Standing :Ball on wall x 30 CW/CCW    PROM performed to the left Shoulder  - Flexion up to 170 deg  - Abd to 110 deg  - ER to approx 75 deg      STM/DTM at L mid/posterior deltoid, proximal triceps and lateral border of scap  Strain Counter strain at the triceps and teres musculature  TrP Release at long head of tricep and teres musculature    Supervised Modalities after being cleared for contradictions: IFC Electrical Stimulation:  Nely received IFC Electrical Stimulation for pain control applied to the Left shoulder. Pt received stimulation for 0 minutes.   Nely tolderated treatment well without any adverse effects.     cold pack for left shld for 0 minutes with Estim    Patient Education and Home Exercises       Education provided: Therapeutic exercises.    Written Home Exercises Provided: Patient instructed to cont prior HEP. Exercises were reviewed and Nely was able to demonstrate them prior to the end of the session.  Nely demonstrated good   "understanding of the education provided. See EMR under Patient Instructions for exercises provided during therapy sessions    Assessment     10 weeks post OP. Today's session continued with focus on ROM and interventions to reduce tissue tightness and alleviate pain at the elbow. Pt tolerated addition of prone Rows, abd and ext. Shoulder ROM remains limited with overhead motions however pt able to demonstrate improved flexion. Minimal TTP noted at proximal triceps . Tightness of tissue remains throughout posterior shoulder girdle. No exacerbations reported.    Nely Is progressing well towards her goals.   Pt prognosis is Good.     Pt will continue to benefit from skilled outpatient physical therapy to address the deficits listed in the problem list box on initial evaluation, provide pt/family education and to maximize pt's level of independence in the home and community environment.   Pt's spiritual, cultural and educational needs considered and pt agreeable to plan of care and goals.     Anticipated barriers to physical therapy: None    Goals: Short Term Goals: 3 weeks   Report decreased left shld pain  < / =  4/10 "At Worst" to increase tolerance for ADLs  2. Pt will present with minimal restrictions left cervical musculature improving flexibility and decreasing tension reducing cervical discomfort.  4. Pt to tolerate HEP to improve ROM and independence with ADL's     Long Term Goals: 6 weeks   Report decreased left shld pain  < / =  2/10 "At Worst" to increase tolerance for ADLs  2.improved limitation score to 37 % or less  3. Independent with HEP for continued improvement in ROM/Strength.  4. Strength: Improve  left UE strength by 1 grade  5. Pt will demonstrate AROM left shld WNL    Plan     Rhythmic stabilization, Post capsule stretching,submaximal elbow isometrics, additional scapular strengthening     Plan of care Certification: 5/1/2023 to 7/30/23.     Outpatient Physical Therapy 2 times weekly for 6 " weeks to include the following interventions: Electrical Stimulation IFC, Manual Therapy, Moist Heat/ Ice, Neuromuscular Re-ed, Therapeutic Activities, and Therapeutic Exercise. Other modalities as appropriate.    Mamta Escoto, PT

## 2023-05-30 ENCOUNTER — CLINICAL SUPPORT (OUTPATIENT)
Dept: REHABILITATION | Facility: HOSPITAL | Age: 53
End: 2023-05-30
Attending: ORTHOPAEDIC SURGERY
Payer: MEDICAID

## 2023-05-30 DIAGNOSIS — G89.29 CHRONIC LEFT SHOULDER PAIN: Primary | ICD-10-CM

## 2023-05-30 DIAGNOSIS — M25.512 CHRONIC LEFT SHOULDER PAIN: Primary | ICD-10-CM

## 2023-05-30 DIAGNOSIS — M54.2 MUSCLE PAIN, CERVICAL: ICD-10-CM

## 2023-05-30 DIAGNOSIS — M25.522 ELBOW PAIN, LEFT: ICD-10-CM

## 2023-05-30 DIAGNOSIS — M25.612 DECREASED ROM OF LEFT SHOULDER: ICD-10-CM

## 2023-05-30 DIAGNOSIS — R29.898 IMPAIRED STRENGTH OF SHOULDER MUSCLES: ICD-10-CM

## 2023-05-30 PROCEDURE — 97110 THERAPEUTIC EXERCISES: CPT | Mod: PN,CQ

## 2023-05-30 NOTE — PROGRESS NOTES
[facility]   Physical Therapy Treatment Note      Name: Nely Salas  Clinic Number: [id number]    Therapy Diagnosis:   Encounter Diagnoses   Name Primary?    Chronic left shoulder pain Yes    Muscle pain, cervical     Elbow pain, left     Decreased ROM of left shoulder     Impaired strength of shoulder muscles      Physician: Wiliam Katz DO    Visit Date: 5/30/2023    Physician Orders: PT Eval and Treat   Medical Diagnosis from Referral: Z98.890 (ICD-10-CM) - S/P arthroscopy of left shoulder  Evaluation Date: 5/1/2023  Authorization Period Expiration: 5/1/24  Plan of Care Expiration: 7/30/23  Visit # / Visits authorized: 6 / 20 (Not including Initial Evaluation)  FOTO Visit #:  1/3    PTA Visit #: 1/5     Time In: 10:05 AM  Time Out: 11:05 AM  Total Billable Time: 53 minutes    Sx:S/P arthroscopy of her left shoulder with labrum debridement rotator cuff repair, biceps tenodesis, subacromial bursectomy, and distal clavicle resection. Date of surgery 03/13/2023.     10 weeks post op  Subjective     Pt reports: My shoulder is sore today.     She was compliant with home exercise program.  Response to previous treatment: Felt good after the last session.  Functional change: None reported.     Pain: 5/10  Location: left Shoulder and scapular region      Objective      Objective Measures updated at progress report unless specified.     Treatment     Nely received the treatments listed below:      Therapeutic Exercises: to develop ROM, flexibility, and posture for 53 minutes including:  AAROM-Pulleys x 5 min in scaption PAIN FREE  Left UT stretch 2 x 30 sec  Left Levator 2 x 30 sec   Seated Cervical Flexion x 15  Seated Cervical Rotation x 15   Seated Shld shrugs x 20  Seated Scapular retractions  2 x 15   Chin tucks x 20   Supine passive bicep stretch performed by PT 5 x 15sec  Supine L Elbow Flexion AROM neutral 10x, pronated 10x, supinated 10x  Supine L Tricep Press, Yellow band,  15x   Supine L forearm pronation/supination 0# 15x  Supine AAROM Flexion with dowel to 90 deg  2 x 15  Supine L shoulder AAROM flexion w/ dowel x 20  Supine AAROM ER to approx 50 deg d with dowel  (shld 0 deg abd) 2 x 15  Supine L shoulder Int/Ext Rotation AROM 15x  Supine Serratus Punch with 2lb Dowel x 20   Supine L shoulder isometric extension into towel roll, 15x- 5 sec each  Supine L shoulder isometric Int / Ext rotation, 15x each way  Supine L isometric elbow flexion / extension, 15x each way  Sidelying Passive Upper trap (L) stretch performed by PT 3 x 30sec  Sidelying Passive L scapular ranging in all directions performed by PT x 4mins  Sidelying L scapular retraction 20x  Sidelying L scapular depression 20x  Sidelying L shoulder ext rotation 15x   Wall Slides : Flexion x 20 ; Abduction (pain free range) x 20   Prone Rows x 10  Prone Extension x 10   Prone Shoulder ABD x 10   Standing :Ball on wall x 30 CW/CCW    PROM performed to the left Shoulder  - Flexion up to 170 deg  - Abd to 110 deg  - ER to approx 75 deg      STM/DTM at L mid/posterior deltoid, proximal triceps and lateral border of scap  Strain Counter strain at the triceps and teres musculature  TrP Release at long head of tricep and teres musculature    Supervised Modalities after being cleared for contradictions: IFC Electrical Stimulation:  Nely received IFC Electrical Stimulation for pain control applied to the Left shoulder. Pt received stimulation for 0 minutes.   Nely tolderated treatment well without any adverse effects.     cold pack for left shld for 0 minutes with Estim    Patient Education and Home Exercises       Education provided: Therapeutic exercises.    Written Home Exercises Provided: Patient instructed to cont prior HEP. Exercises were reviewed and Nely was able to demonstrate them prior to the end of the session.  Nely demonstrated good  understanding of the education provided. See EMR under Patient Instructions  "for exercises provided during therapy sessions    Assessment     10 weeks post OP. Today's session continued with focus on ROM and interventions to reduce tissue tightness and alleviate pain at the elbow. Pt tolerated addition of prone Rows, abd and ext. Shoulder ROM remains limited with overhead motions however pt able to demonstrate improved flexion. Tightness of tissue remains throughout posterior shoulder girdle. No exacerbations reported.    Nely Is progressing well towards her goals.   Pt prognosis is Good.     Pt will continue to benefit from skilled outpatient physical therapy to address the deficits listed in the problem list box on initial evaluation, provide pt/family education and to maximize pt's level of independence in the home and community environment.   Pt's spiritual, cultural and educational needs considered and pt agreeable to plan of care and goals.     Anticipated barriers to physical therapy: None    Goals: Short Term Goals: 3 weeks   Report decreased left shld pain  < / =  4/10 "At Worst" to increase tolerance for ADLs  2. Pt will present with minimal restrictions left cervical musculature improving flexibility and decreasing tension reducing cervical discomfort.  4. Pt to tolerate HEP to improve ROM and independence with ADL's     Long Term Goals: 6 weeks   Report decreased left shld pain  < / =  2/10 "At Worst" to increase tolerance for ADLs  2.improved limitation score to 37 % or less  3. Independent with HEP for continued improvement in ROM/Strength.  4. Strength: Improve  left UE strength by 1 grade  5. Pt will demonstrate AROM left shld WNL    Plan     Rhythmic stabilization, Post capsule stretching,submaximal elbow isometrics, additional scapular strengthening     Plan of care Certification: 5/1/2023 to 7/30/23.     Outpatient Physical Therapy 2 times weekly for 6 weeks to include the following interventions: Electrical Stimulation IFC, Manual Therapy, Moist Heat/ Ice, " Neuromuscular Re-ed, Therapeutic Activities, and Therapeutic Exercise. Other modalities as appropriate.    Jonathan Favre, PTA

## 2023-06-06 ENCOUNTER — CLINICAL SUPPORT (OUTPATIENT)
Dept: REHABILITATION | Facility: HOSPITAL | Age: 53
End: 2023-06-06
Attending: ORTHOPAEDIC SURGERY
Payer: MEDICAID

## 2023-06-06 DIAGNOSIS — M25.512 CHRONIC LEFT SHOULDER PAIN: Primary | ICD-10-CM

## 2023-06-06 DIAGNOSIS — G89.29 CHRONIC LEFT SHOULDER PAIN: Primary | ICD-10-CM

## 2023-06-06 DIAGNOSIS — M54.2 MUSCLE PAIN, CERVICAL: ICD-10-CM

## 2023-06-06 DIAGNOSIS — M25.522 ELBOW PAIN, LEFT: ICD-10-CM

## 2023-06-06 DIAGNOSIS — M25.612 DECREASED ROM OF LEFT SHOULDER: ICD-10-CM

## 2023-06-06 DIAGNOSIS — R29.898 IMPAIRED STRENGTH OF SHOULDER MUSCLES: ICD-10-CM

## 2023-06-06 PROCEDURE — 97140 MANUAL THERAPY 1/> REGIONS: CPT | Mod: PN

## 2023-06-06 PROCEDURE — 97110 THERAPEUTIC EXERCISES: CPT | Mod: PN

## 2023-06-06 NOTE — PROGRESS NOTES
" [facility]   Physical Therapy Treatment Note /PROGRESS NOTE     Name: Nely Salas  Clinic Number: [id number]    Therapy Diagnosis:   No diagnosis found.    Physician: Wiliam Katz DO    Visit Date: 6/6/2023    Physician Orders: PT Eval and Treat   Medical Diagnosis from Referral: Z98.890 (ICD-10-CM) - S/P arthroscopy of left shoulder  Evaluation Date: 5/1/2023  Authorization Period Expiration: 5/1/24  Plan of Care Expiration: 7/30/23  Visit # / Visits authorized: 7 / 20 (Not including Initial Evaluation)  FOTO Visit #:  1/3    PTA Visit #: 1/5     Time In: 10:05 AM  Time Out: 11:05 AM  Total Billable Time: 53 minutes    Sx:S/P arthroscopy of her left shoulder with labrum debridement rotator cuff repair, biceps tenodesis, subacromial bursectomy, and distal clavicle resection. Date of surgery 03/13/2023.     10 weeks 5 days  post op  Subjective     Pt reports: pt reports burning sensation of the left elbow. Left shoulder is still sore and "crunch" everyday.    She was compliant with home exercise program.  Response to previous treatment: Felt good after the last session.  Functional change: None reported.     Pain: 4/10  Location: left Shoulder and scapular region      Objective      Objective Measures updated at progress report unless specified.   Shoulder Range of Motion:    Left active Left Passive   Flexion 160 170   Abduction 153  160   Extension WNL WNL   Ext. Rotation 68 75   Int. Rotation T9 30 deg   Elbow Flexion WNL --   Elbow Extension WNL              Upper Extremity Strength    Left   Shoulder flexion: 3+/5   Shoulder Abduction: 4/5   Shoulder ER 4-/5   Shoulder IR 4/5   Wrist flexion 5/5   Wrist ext 5/5   Elbow flexion 4/5   Elbow Ext 4/5            Joint mobility: restricted     Palpation:moderate tenderness to palpation at left Anterior shoulder, UT and biceps     Sensation: Occasional numbness and tingling in the hand     Flexibility:                Upper Trap = L " moderate restriction              Scalenes:  L minimal restriction              SCM:  L min restriction              Levator Scap: L min restriction  Treatment     Nely received the treatments listed below:      Therapeutic Exercises: to develop ROM, flexibility, and posture for 32 minutes including:  AAROM-Pulleys x 8 min in scaption PAIN FREE  Left UT stretch 2 x 30 sec  Left Levator 2 x 30 sec   Seated Cervical Flexion x 15  Seated Cervical Rotation x 15   Seated Shld shrugs x 20  Seated Scapular retractions  2 x 15   Chin tucks x 20   Supine passive bicep stretch performed by PT 5 x 15sec  Supine L Elbow Flexion AROM neutral 15x, pronated 15x, supinated 15x  Supine L Tricep Press, Yellow band, 15x   Supine L forearm pronation/supination 0# 15x  Supine AAROM Flexion with dowel to 90 deg  2 x 15  Supine L shoulder AROM flexion x 20  Supine AAROM ER to approx 50 deg d with dowel  (shld 0 deg abd) 2 x 15  Supine L shoulder Int/Ext Rotation AROM 15x  Supine Serratus Punch with 2lb Dowel x 20   Supine L shoulder isometric extension into towel roll, 15x- 5 sec each  Supine L shoulder isometric Int / Ext rotation, 15x each way  Supine L isometric elbow flexion / extension, 15x each way  Seated  uppertrap (L) stretch performed by PT 3 x 30sec  Sidelying Passive L scapular ranging in all directions performed by PT x 4mins  Seated scapular retraction 30x  Sidelying L scapular depression with manual resistance  20x  Sidelying L shoulder ext rotation 15x 2  Wall Slides : Flexion x 20 ; Abduction (pain free range) x 20   Prone Rows 2 x 10 (Modified standing)  Prone Extension 2 x 10 (Modified standing)  Prone Shoulder ABD 2 x 10 (Modified standing)  Standing :Ball on wall x 30 CW/CCW      Manual x 23 mins  Minimal manual resistance for rhythmic stabilization 90 deg all directions 30 sec x 4  PROM performed to the left Shoulder  - Flexion up to 170 deg  - Abd to 120 deg  - ER to 5 deg  STM/DTM at L mid/posterior deltoid,  proximal triceps, UT and levator  Strain Counter strain at the triceps and teres musculature  TrP Release at long head of tricep and teres musculature    Supervised Modalities after being cleared for contradictions: IFC Electrical Stimulation:  Nely received IFC Electrical Stimulation for pain control applied to the Left shoulder. Pt received stimulation for 0 minutes.   Nely tolderated treatment well without any adverse effects.     cold pack for left shld for 0 minutes with Estim    Patient Education and Home Exercises       Education provided: Therapeutic exercises.    Written Home Exercises Provided: Patient instructed to cont prior HEP. Exercises were reviewed and Nely was able to demonstrate them prior to the end of the session.  Nely demonstrated good  understanding of the education provided. See EMR under Patient Instructions for exercises provided during therapy sessions    Assessment      Today's session continued with focus on ROM and interventions to reduce tissue tightness and alleviate pain at the elbow. Pt tolerated addition of Standing Rows, abd and ext utilizing plinth 2/2 to difficulty lying prone this date. Good tolerance to addition of rhythymic stabilization. Progress note completed. Shoulder ROM remains limited with overhead motions however increased flexion, IR, ER and abd noted.Further improvements in general strength and functional status. Tightness of tissue remains throughout posterior shoulder girdle. No exacerbations reported. Pain reduced to 2/10 at the end of session. Follow up with Dr. Katz scheduled for tomorrow. Will plan to progress strengthening and ROM as tolerated.    Nely Is progressing well towards her goals.   Pt prognosis is Good.     Pt will continue to benefit from skilled outpatient physical therapy to address the deficits listed in the problem list box on initial evaluation, provide pt/family education and to maximize pt's level of independence in the  "home and community environment.   Pt's spiritual, cultural and educational needs considered and pt agreeable to plan of care and goals.     Anticipated barriers to physical therapy: None    Goals: Short Term Goals: 3 weeks   Report decreased left shld pain  < / =  4/10 "At Worst" to increase tolerance for ADLs>Progressing   2. Pt will present with minimal restrictions left cervical musculature improving flexibility and decreasing tension reducing cervical discomfort.  4. Pt to tolerate HEP to improve ROM and independence with ADL's>MET     Long Term Goals: 6 weeks   Report decreased left shld pain  < / =  2/10 "At Worst" to increase tolerance for ADLs >PROGRESSING  2.improved limitation score to 37 % or less> PROGRESSING 39%  3. Independent with HEP for continued improvement in ROM/Strength.>PROGRESSING   4. Strength: Improve  left UE strength by 1 grade> PROGRESSING  5. Pt will demonstrate AROM left shld WNL> PROGRESSING    Plan     Rhythmic stabilization, Post capsule stretching,submaximal elbow isometrics, additional scapular strengthening     Plan of care Certification: 5/1/2023 to 7/30/23.     Outpatient Physical Therapy 2 times weekly for 6 weeks to include the following interventions: Electrical Stimulation IFC, Manual Therapy, Moist Heat/ Ice, Neuromuscular Re-ed, Therapeutic Activities, and Therapeutic Exercise. Other modalities as appropriate.    Mamta Escoto, PT            "

## 2023-06-07 ENCOUNTER — OFFICE VISIT (OUTPATIENT)
Dept: ORTHOPEDICS | Facility: CLINIC | Age: 53
End: 2023-06-07
Payer: MEDICAID

## 2023-06-07 VITALS — RESPIRATION RATE: 18 BRPM | BODY MASS INDEX: 47.31 KG/M2 | WEIGHT: 283.94 LBS | HEIGHT: 65 IN

## 2023-06-07 DIAGNOSIS — Z98.890 S/P ARTHROSCOPY OF LEFT SHOULDER: Primary | ICD-10-CM

## 2023-06-07 PROCEDURE — 3008F BODY MASS INDEX DOCD: CPT | Mod: CPTII,,, | Performed by: ORTHOPAEDIC SURGERY

## 2023-06-07 PROCEDURE — 99999 PR PBB SHADOW E&M-EST. PATIENT-LVL III: ICD-10-PCS | Mod: PBBFAC,,, | Performed by: ORTHOPAEDIC SURGERY

## 2023-06-07 PROCEDURE — 99999 PR PBB SHADOW E&M-EST. PATIENT-LVL III: CPT | Mod: PBBFAC,,, | Performed by: ORTHOPAEDIC SURGERY

## 2023-06-07 PROCEDURE — 4010F PR ACE/ARB THEARPY RXD/TAKEN: ICD-10-PCS | Mod: CPTII,,, | Performed by: ORTHOPAEDIC SURGERY

## 2023-06-07 PROCEDURE — 99213 OFFICE O/P EST LOW 20 MIN: CPT | Mod: PBBFAC | Performed by: ORTHOPAEDIC SURGERY

## 2023-06-07 PROCEDURE — 99024 PR POST-OP FOLLOW-UP VISIT: ICD-10-PCS | Mod: ,,, | Performed by: ORTHOPAEDIC SURGERY

## 2023-06-07 PROCEDURE — 1159F PR MEDICATION LIST DOCUMENTED IN MEDICAL RECORD: ICD-10-PCS | Mod: CPTII,,, | Performed by: ORTHOPAEDIC SURGERY

## 2023-06-07 PROCEDURE — 1159F MED LIST DOCD IN RCRD: CPT | Mod: CPTII,,, | Performed by: ORTHOPAEDIC SURGERY

## 2023-06-07 PROCEDURE — 3008F PR BODY MASS INDEX (BMI) DOCUMENTED: ICD-10-PCS | Mod: CPTII,,, | Performed by: ORTHOPAEDIC SURGERY

## 2023-06-07 PROCEDURE — 4010F ACE/ARB THERAPY RXD/TAKEN: CPT | Mod: CPTII,,, | Performed by: ORTHOPAEDIC SURGERY

## 2023-06-07 PROCEDURE — 99024 POSTOP FOLLOW-UP VISIT: CPT | Mod: ,,, | Performed by: ORTHOPAEDIC SURGERY

## 2023-06-07 NOTE — PROGRESS NOTES
Patient ID: Nely Salas is a 52 y.o. female.     Chief Complaint: Post-op Evaluation and Pain of the Left Shoulder        HPI:  Ms. Salas returned today for a 3 month follow-up visit after an arthroscopy of her left shoulder with labrum debridement rotator cuff repair, biceps tenodesis, subacromial bursectomy, and distal clavicle resection, performed on 03/13/2023.  She has been going to physical therapy and feels she is is doing well.  She has regained full motion, She stated she has some minor residual pain her shoulder which she feels will improve with a little bit more physical therapy..  Her shoulder pain originally began approximately 1 year ago after she was involved in a [employer] .     ROS:  No new diagnosis/surgery/prescriptions since last office visit on 04/26/2023  Constitution: Negative for chills and fever.   HENT: Negative for congestion.    Eyes: Negative for blurred vision.   Cardiovascular: Negative for chest pain.   Respiratory: Negative for cough.    Endocrine: Negative for polydipsia.   Hematologic/Lymphatic: Negative for adenopathy.   Skin: Negative for flushing and itching.   Musculoskeletal: Positive for back pain and joint pain. Negative for gout.   Gastrointestinal: Positive for heartburn. Negative for constipation and diarrhea.   Genitourinary: Negative for nocturia.   Neurological: Positive for headaches. Negative for seizures.   Psychiatric/Behavioral: Negative for depression and substance abuse. The patient is nervous/anxious.    Allergic/Immunologic: Positive for environmental allergies.       Objective:   Physical Exam:   General: AAOx3.  No acute distress  Vascular:  Pulses intact and equal bilaterally.  Capillary refill less than 3 seconds and equal bilaterally  Neurologic:  Pinprick and soft touch intact and equal bilaterally  Integment:  Incisions well approximated and well healed.  Extremity:  Shoulder:  Active forward flexion/abduction equal bilaterally 0/170  degrees.  Internal rotation equal bilaterally.  External rotation equal bilaterally.  Full can negative both shoulders.  Empty can negative both shoulders.  Benjamin/Neer relatively negative both shoulders.  Cross-arm negative both shoulders.  Nontender in the bicipital groove bilaterally.  Radiography:  No new x-rays done today.      Assessment:       Impression:      1. S/P arthroscopy left shoulder with labrum debridement rotator cuff repair, biceps tenodesis, subacromial bursectomy, and distal clavicle resection          Plan:       1.  Discussed physical examination with the patient. Nely understands that she has progressed well and with a little bit more physical therapy some of the residual pain in her shoulder should resolve.  She will also gain a little bit more strength in her shoulder as she feels she has some slight weakness still.    2. Refer back to physical therapy to attend a proximally 6 more weeks of physical therapy which should gaining the strength that she needs.    3. For any pain she can take over-the-counter medications dosed per box instructions.    4. Continue doing home exercises which were previously shown discussed.  5. Follow up in 6 weeks for final evaluation

## 2023-06-08 ENCOUNTER — CLINICAL SUPPORT (OUTPATIENT)
Dept: REHABILITATION | Facility: HOSPITAL | Age: 53
End: 2023-06-08
Attending: ORTHOPAEDIC SURGERY
Payer: MEDICAID

## 2023-06-08 DIAGNOSIS — M25.612 DECREASED ROM OF LEFT SHOULDER: ICD-10-CM

## 2023-06-08 DIAGNOSIS — M25.522 ELBOW PAIN, LEFT: ICD-10-CM

## 2023-06-08 DIAGNOSIS — M54.2 MUSCLE PAIN, CERVICAL: ICD-10-CM

## 2023-06-08 DIAGNOSIS — R29.898 IMPAIRED STRENGTH OF SHOULDER MUSCLES: ICD-10-CM

## 2023-06-08 DIAGNOSIS — M25.512 CHRONIC LEFT SHOULDER PAIN: Primary | ICD-10-CM

## 2023-06-08 DIAGNOSIS — G89.29 CHRONIC LEFT SHOULDER PAIN: Primary | ICD-10-CM

## 2023-06-08 PROCEDURE — 97140 MANUAL THERAPY 1/> REGIONS: CPT | Mod: PN

## 2023-06-08 PROCEDURE — 97110 THERAPEUTIC EXERCISES: CPT | Mod: PN

## 2023-06-08 NOTE — PROGRESS NOTES
[facility]   Physical Therapy Treatment Note      Name: Nely Salas  Clinic Number: [id number]    Therapy Diagnosis:   Encounter Diagnoses   Name Primary?    Chronic left shoulder pain Yes    Muscle pain, cervical     Elbow pain, left     Decreased ROM of left shoulder     Impaired strength of shoulder muscles        Physician: Wiliam Katz DO    Visit Date: 6/8/2023    Physician Orders: PT Eval and Treat   Medical Diagnosis from Referral: Z98.890 (ICD-10-CM) - S/P arthroscopy of left shoulder  Evaluation Date: 5/1/2023  Authorization Period Expiration: 5/1/24  Plan of Care Expiration: 7/30/23  Visit # / Visits authorized: 8 / 20 (Not including Initial Evaluation)  FOTO Visit #:  1/3    PTA Visit #: 0/5     Time In: 10:05 AM  Time Out: 11:05 AM  Total Billable Time: 55 minutes    Sx:S/P arthroscopy of her left shoulder with labrum debridement rotator cuff repair, biceps tenodesis, subacromial bursectomy, and distal clavicle resection. Date of surgery 03/13/2023.     12 weeks 3 days  post op  Subjective     Pt reports: Better today. Keli been doing a fe things around the house.    She was compliant with home exercise program.  Response to previous treatment: Felt good after the last session.  Functional change: None reported.     Pain: 2/10  Location: left Shoulder and Biceps      Objective      Objective Measures updated at progress report unless specified.   Treatment     Nely received the treatments listed below:      Therapeutic Exercises: to develop ROM, flexibility, and posture for 32 minutes including:  AAROM-Pulleys x 8 min in scaption PAIN FREE  UBE x 6 mins Level 1  Left UT stretch 2 x 30 sec  Left Levator 2 x 30 sec   Seated Cervical Flexion x 15  Seated Cervical Rotation x 15   Seated Shld shrugs x 20  Seated Scapular retractions  2 x 15   Chin tucks x 20   Supine passive bicep stretch performed by PT 5 x 15sec  Seated  L Elbow Flexion AROM neutral 20x, pronated 20x,  supinated 20x  Supine L Tricep Press, Yellow band, 15x   Supine L forearm pronation/supination 0# 15x  Supine AAROM Flexion with dowel to 90 deg  2 x 15  Supine L shoulder AROM flexion x 20  Supine AAROM ER to approx 50 deg d with dowel  (shld 0 deg abd) 2 x 15  Supine L shoulder Int/Ext Rotation AROM 15x  Supine Serratus Punch with 2lb Dowel x 30  Supine L shoulder isometric extension into towel roll, 15x- 5 sec each  Supine L shoulder isometric Int / Ext rotation, 15x each way  Supine L isometric elbow flexion / extension, 15x each way  Seated  uppertrap (L) stretch performed by PT 3 x 30sec  Sidelying Passive L scapular ranging in all directions performed by PT x 4mins  Seated scapular retraction 30x  Sidelying L scapular depression with manual resistance  20x  Sidelying L shoulder ext rotation 15x 2  Wall Slides : Flexion x 20 ; Abduction (pain free range) x 20   Prone Rows 3 x 10 (Modified standing) 1lb  Prone Extension 3 x 10 (Modified standing)  Prone Shoulder ABD 3 x 10 (Modified standing)  Standing :Ball on wall x 30 CW/CCW  Standing IR YTB X 15; RTB x 10  Supine PNF D1 (Seat belt) x 15  SupinePNF D2 (Sword) x 15    Consider: adding IR stretch with towel, RC isometrics    Manual x 23 mins  Minimal to moderate force manual resistance for rhythmic stabilization 90 flex/ex 2 x 30 sec; ABD/ADD 2 x 30 sec  PROM performed to the left Shoulder  - Flexion up to 173 deg  - Abd to 130 deg  - ER to  80 deg  -IR  30 DEG  STM/DTM at L mid/posterior deltoid, proximal triceps, UT and levator  Strain Counter strain at the triceps and teres musculature  TrP Release at long head of tricep , distal biceps    Supervised Modalities after being cleared for contradictions: IFC Electrical Stimulation:  Nely received IFC Electrical Stimulation for pain control applied to the Left shoulder. Pt received stimulation for 0 minutes.   Nely tolderated treatment well without any adverse effects.     cold pack for left shld for 0  "minutes with Estim    Patient Education and Home Exercises       Education provided: Therapeutic exercises.    Written Home Exercises Provided: Patient instructed to cont prior HEP. Exercises were reviewed and Nely was able to demonstrate them prior to the end of the session.  Nely demonstrated good  understanding of the education provided. See EMR under Patient Instructions for exercises provided during therapy sessions    Assessment      Today's session continued with focus on ROM and interventions to reduce tissue tightness and alleviate pain at the elbow. Increased reps and resistance as tolerated.Progressed pt AROM adding PNF patterns. No exacerbations reported. Pain reduced to 1/10 at the end of session with improved ROM. Will plan to progress strengthening and ROM as tolerated.    Nely Is progressing well towards her goals.   Pt prognosis is Good.     Pt will continue to benefit from skilled outpatient physical therapy to address the deficits listed in the problem list box on initial evaluation, provide pt/family education and to maximize pt's level of independence in the home and community environment.   Pt's spiritual, cultural and educational needs considered and pt agreeable to plan of care and goals.     Anticipated barriers to physical therapy: None    Goals: Short Term Goals: 3 weeks   Report decreased left shld pain  < / =  4/10 "At Worst" to increase tolerance for ADLs>Progressing   2. Pt will present with minimal restrictions left cervical musculature improving flexibility and decreasing tension reducing cervical discomfort.  4. Pt to tolerate HEP to improve ROM and independence with ADL's>MET     Long Term Goals: 6 weeks   Report decreased left shld pain  < / =  2/10 "At Worst" to increase tolerance for ADLs >PROGRESSING  2.improved limitation score to 37 % or less> PROGRESSING 39%  3. Independent with HEP for continued improvement in ROM/Strength.>PROGRESSING   4. Strength: Improve  left " UE strength by 1 grade> PROGRESSING  5. Pt will demonstrate AROM left shld WNL> PROGRESSING    Plan     Rhythmic stabilization, Post capsule stretching, Contract relax techniques.    Plan of care Certification: 5/1/2023 to 7/30/23.     Outpatient Physical Therapy 2 times weekly for 6 weeks to include the following interventions: Electrical Stimulation IFC, Manual Therapy, Moist Heat/ Ice, Neuromuscular Re-ed, Therapeutic Activities, and Therapeutic Exercise. Other modalities as appropriate.    Mamta Escoto, PT

## 2023-06-13 ENCOUNTER — CLINICAL SUPPORT (OUTPATIENT)
Dept: REHABILITATION | Facility: HOSPITAL | Age: 53
End: 2023-06-13
Attending: ORTHOPAEDIC SURGERY
Payer: MEDICAID

## 2023-06-13 DIAGNOSIS — M54.2 MUSCLE PAIN, CERVICAL: ICD-10-CM

## 2023-06-13 DIAGNOSIS — M25.612 DECREASED ROM OF LEFT SHOULDER: ICD-10-CM

## 2023-06-13 DIAGNOSIS — G89.29 CHRONIC LEFT SHOULDER PAIN: Primary | ICD-10-CM

## 2023-06-13 DIAGNOSIS — M25.522 ELBOW PAIN, LEFT: ICD-10-CM

## 2023-06-13 DIAGNOSIS — R29.898 IMPAIRED STRENGTH OF SHOULDER MUSCLES: ICD-10-CM

## 2023-06-13 DIAGNOSIS — M25.512 CHRONIC LEFT SHOULDER PAIN: Primary | ICD-10-CM

## 2023-06-13 PROCEDURE — 97140 MANUAL THERAPY 1/> REGIONS: CPT | Mod: PN,CQ

## 2023-06-13 PROCEDURE — 97110 THERAPEUTIC EXERCISES: CPT | Mod: PN,CQ

## 2023-06-13 NOTE — PROGRESS NOTES
[facility]   Physical Therapy Treatment Note      Name: Nely Salas  Clinic Number: [id number]    Therapy Diagnosis:   Encounter Diagnoses   Name Primary?    Chronic left shoulder pain Yes    Muscle pain, cervical     Elbow pain, left     Decreased ROM of left shoulder     Impaired strength of shoulder muscles        Physician: Wiliam Katz DO    Visit Date: 6/13/2023    Physician Orders: PT Eval and Treat   Medical Diagnosis from Referral: Z98.890 (ICD-10-CM) - S/P arthroscopy of left shoulder  Evaluation Date: 5/1/2023  Authorization Period Expiration: 5/1/24  Plan of Care Expiration: 7/30/23  Visit # / Visits authorized: 9 / 20 (Not including Initial Evaluation)  FOTO Visit #:  1/3    PTA Visit #: 1/5     Time In: 9:00 AM  Time Out: 10:00 AM  Total Billable Time: 55 minutes    Sx:S/P arthroscopy of her left shoulder with labrum debridement rotator cuff repair, biceps tenodesis, subacromial bursectomy, and distal clavicle resection. Date of surgery 03/13/2023.     12 weeks 3 days  post op  Subjective     Pt reports: My shoulder/elbow is a little sore today.    She was compliant with home exercise program.  Response to previous treatment: Felt good after the last session.  Functional change: None reported.     Pain: 3/10  Location: left Shoulder and Biceps      Objective      Objective Measures updated at progress report unless specified.   Treatment     Nely received the treatments listed below:      Therapeutic Exercises: to develop ROM, flexibility, and posture for 32 minutes including:  AAROM-Pulleys x 5 min in scaption PAIN FREE  UBE x 8 mins Level 1  Left UT stretch 2 x 30 sec  Left Levator 2 x 30 sec   Seated Cervical Flexion x 15  Seated Cervical Rotation x 15   Seated Shld shrugs x 20  Seated Scapular retractions  2 x 15   Chin tucks x 20   Supine passive bicep stretch performed by PT 5 x 15sec  Seated  L Elbow Flexion AROM neutral 20x, pronated 20x, supinated  20x  Supine L Tricep Press, Yellow band, 15x   Supine L forearm pronation/supination 0# 20x  Supine AAROM Flexion with dowel to 90 deg  2 x 15  Supine L shoulder AROM flexion x 20  Supine AAROM ER to approx 50 deg d with dowel  (shld 0 deg abd) 2 x 15  Supine L shoulder Int/Ext Rotation AROM 15x  Supine Serratus Punch with 2lb Dowel x 30  Supine L shoulder isometric extension into towel roll, 15x- 5 sec each  Supine L shoulder isometric Int / Ext rotation, 15x each way  Supine L isometric elbow flexion / extension, 15x each way  Seated  uppertrap (L) stretch performed by PT 3 x 30sec  Sidelying Passive L scapular ranging in all directions performed by PT x 4mins  Seated scapular retraction 30x  Sidelying L scapular depression with manual resistance  20x  Sidelying L shoulder ext rotation 15x 2  Wall Slides : Flexion x 20 ; Abduction (pain free range) x 20   Prone Rows 3 x 10 (Modified standing) 1lb  Prone Extension 3 x 10 (Modified standing)  Prone Shoulder ABD 3 x 10 (Modified standing)  Standing :Ball on wall x 30 CW/CCW  Standing IR YTB X 15; RTB x 10  Supine PNF D1 (Seat belt) x 15  SupinePNF D2 (Sword) x 15    Consider: adding IR stretch with towel, RC isometrics    Manual x 23 mins  Minimal to moderate force manual resistance for rhythmic stabilization 90 flex/ex 2 x 30 sec; ABD/ADD 2 x 30 sec  PROM performed to the left Shoulder  - Flexion up to 173 deg  - Abd up to 130 deg  - ER up to 80 deg  -IR up to 30 deg  STM/DTM at L mid/posterior deltoid, proximal triceps, UT and levator  Strain Counter strain at the triceps and teres musculature  TrP Release at long head of tricep , distal biceps    Supervised Modalities after being cleared for contradictions: IFC Electrical Stimulation:  Nely received IFC Electrical Stimulation for pain control applied to the Left shoulder. Pt received stimulation for 0 minutes.   Nely tolderated treatment well without any adverse effects.     cold pack for left shld for 0  "minutes with Estim    Patient Education and Home Exercises       Education provided: Therapeutic exercises.    Written Home Exercises Provided: Patient instructed to cont prior HEP. Exercises were reviewed and Nely was able to demonstrate them prior to the end of the session.  Nely demonstrated good  understanding of the education provided. See EMR under Patient Instructions for exercises provided during therapy sessions    Assessment      Today's session continued with focus on ROM and interventions to reduce tissue tightness and alleviate pain at the elbow. Increased reps and resistance as tolerated.Progressed pt AROM adding PNF patterns. No exacerbations reported. Pain reduced to 1/10 at the end of session with improved ROM. Will plan to progress strengthening and ROM as tolerated.    Nely Is progressing well towards her goals.   Pt prognosis is Good.     Pt will continue to benefit from skilled outpatient physical therapy to address the deficits listed in the problem list box on initial evaluation, provide pt/family education and to maximize pt's level of independence in the home and community environment.   Pt's spiritual, cultural and educational needs considered and pt agreeable to plan of care and goals.     Anticipated barriers to physical therapy: None    Goals: Short Term Goals: 3 weeks   Report decreased left shld pain  < / =  4/10 "At Worst" to increase tolerance for ADLs>Progressing   2. Pt will present with minimal restrictions left cervical musculature improving flexibility and decreasing tension reducing cervical discomfort.  4. Pt to tolerate HEP to improve ROM and independence with ADL's>MET     Long Term Goals: 6 weeks   Report decreased left shld pain  < / =  2/10 "At Worst" to increase tolerance for ADLs >PROGRESSING  2.improved limitation score to 37 % or less> PROGRESSING 39%  3. Independent with HEP for continued improvement in ROM/Strength.>PROGRESSING   4. Strength: Improve  left " UE strength by 1 grade> PROGRESSING  5. Pt will demonstrate AROM left shld WNL> PROGRESSING    Plan     Rhythmic stabilization, Post capsule stretching, Contract relax techniques.    Plan of care Certification: 5/1/2023 to 7/30/23.     Outpatient Physical Therapy 2 times weekly for 6 weeks to include the following interventions: Electrical Stimulation IFC, Manual Therapy, Moist Heat/ Ice, Neuromuscular Re-ed, Therapeutic Activities, and Therapeutic Exercise. Other modalities as appropriate.    Jonathan Favre, PTA

## 2023-06-15 ENCOUNTER — CLINICAL SUPPORT (OUTPATIENT)
Dept: REHABILITATION | Facility: HOSPITAL | Age: 53
End: 2023-06-15
Attending: ORTHOPAEDIC SURGERY
Payer: MEDICAID

## 2023-06-15 DIAGNOSIS — M54.2 MUSCLE PAIN, CERVICAL: ICD-10-CM

## 2023-06-15 DIAGNOSIS — M25.512 CHRONIC LEFT SHOULDER PAIN: Primary | ICD-10-CM

## 2023-06-15 DIAGNOSIS — G89.29 CHRONIC LEFT SHOULDER PAIN: Primary | ICD-10-CM

## 2023-06-15 DIAGNOSIS — M25.522 ELBOW PAIN, LEFT: ICD-10-CM

## 2023-06-15 DIAGNOSIS — M25.612 DECREASED ROM OF LEFT SHOULDER: ICD-10-CM

## 2023-06-15 DIAGNOSIS — R29.898 IMPAIRED STRENGTH OF SHOULDER MUSCLES: ICD-10-CM

## 2023-06-15 PROCEDURE — 97140 MANUAL THERAPY 1/> REGIONS: CPT | Mod: PN,CQ

## 2023-06-15 NOTE — PROGRESS NOTES
[facility]   Physical Therapy Treatment Note      Name: Nely Salas  Clinic Number: [id number]    Therapy Diagnosis:   Encounter Diagnoses   Name Primary?    Chronic left shoulder pain Yes    Muscle pain, cervical     Elbow pain, left     Decreased ROM of left shoulder     Impaired strength of shoulder muscles        Physician: Wiliam Katz DO    Visit Date: 6/15/2023    Physician Orders: PT Eval and Treat   Medical Diagnosis from Referral: Z98.890 (ICD-10-CM) - S/P arthroscopy of left shoulder  Evaluation Date: 5/1/2023  Authorization Period Expiration: 5/1/24  Plan of Care Expiration: 7/30/23  Visit # / Visits authorized: 10 / 20 (Not including Initial Evaluation)  FOTO Visit #:  1/3    PTA Visit #: 1/5     Time In: 9:00 AM  Time Out: 10:00 AM  Total Billable Time: 55 minutes    Sx:S/P arthroscopy of her left shoulder with labrum debridement rotator cuff repair, biceps tenodesis, subacromial bursectomy, and distal clavicle resection. Date of surgery 03/13/2023.     12 weeks 3 days  post op  Subjective     Pt reports: left shoulder, neck, left elbow and bicep pain.  She doesn't think she should exercise it today.    She was compliant with home exercise program.  Response to previous treatment: Felt good after the last session.  Functional change: None reported.     Pain: 6/10  Location: left Shoulder and Biceps      Objective      Objective Measures updated at progress report unless specified.   Treatment     Nely received the treatments listed below:      Therapeutic Exercises: to develop ROM, flexibility, and posture for 0 minutes including:  AAROM-Pulleys x 5 min in scaption PAIN FREE  UBE x 8 mins Level 1  Left UT stretch 2 x 30 sec  Left Levator 2 x 30 sec   Seated Cervical Flexion x 15  Seated Cervical Rotation x 15   Seated Shld shrugs x 20  Seated Scapular retractions  2 x 15   Chin tucks x 20   Supine passive bicep stretch performed by PT 5 x 15sec  Seated  L Elbow  Flexion AROM neutral 20x, pronated 20x, supinated 20x  Supine L Tricep Press, Yellow band, 15x   Supine L forearm pronation/supination 0# 20x  Supine AAROM Flexion with dowel to 90 deg  2 x 15  Supine L shoulder AROM flexion x 20  Supine AAROM ER to approx 50 deg d with dowel  (shld 0 deg abd) 2 x 15  Supine L shoulder Int/Ext Rotation AROM 15x  Supine Serratus Punch with 2lb Dowel x 30  Supine L shoulder isometric extension into towel roll, 15x- 5 sec each  Supine L shoulder isometric Int / Ext rotation, 15x each way  Supine L isometric elbow flexion / extension, 15x each way  Seated  uppertrap (L) stretch performed by PT 3 x 30sec  Sidelying Passive L scapular ranging in all directions performed by PT x 4mins  Seated scapular retraction 30x  Sidelying L scapular depression with manual resistance  20x  Sidelying L shoulder ext rotation 15x 2  Wall Slides : Flexion x 20 ; Abduction (pain free range) x 20   Prone Rows 3 x 10 (Modified standing) 1lb  Prone Extension 3 x 10 (Modified standing)  Prone Shoulder ABD 3 x 10 (Modified standing)  Standing :Ball on wall x 30 CW/CCW  Standing IR YTB X 15; RTB x 10  Supine PNF D1 (Seat belt) x 15  SupinePNF D2 (Sword) x 15    Consider: adding IR stretch with towel, RC isometrics    Manual x 53 mins  Minimal to moderate force manual resistance for rhythmic stabilization 90 flex/ex 2 x 30 sec; ABD/ADD 2 x 30 sec  PROM performed to the left Shoulder  - Flexion up to 173 deg  - Abd up to 130 deg  - ER up to 80 deg  -IR up to 30 deg  STM/DTM at L mid/posterior deltoid, proximal triceps, UT and levator  Strain Counter strain at the triceps and teres musculature  TrP Release at long head of tricep , distal biceps  Myofascial Release   Sub occipitals   Upper traps/scalenes   Left pec minor   Superficial left arm and wrist  KT tape to shorten left bicep tendon (discussed precautions)    Supervised Modalities after being cleared for contradictions: IFC Electrical Stimulation:  Nely  "received IFC Electrical Stimulation for pain control applied to the Left shoulder. Pt received stimulation for 0 minutes.   Nely tolderated treatment well without any adverse effects.     cold pack for left shld for 0 minutes with Estim    Patient Education and Home Exercises       Education provided: Therapeutic exercises.    Written Home Exercises Provided: Patient instructed to cont prior HEP. Exercises were reviewed and Nely was able to demonstrate them prior to the end of the session.  Nely demonstrated good  understanding of the education provided. See EMR under Patient Instructions for exercises provided during therapy sessions    Assessment      Today's session focused on myofascial tightness.  Will plan to progress strengthening and ROM as tolerated.    Nely Is progressing well towards her goals.   Pt prognosis is Good.     Pt will continue to benefit from skilled outpatient physical therapy to address the deficits listed in the problem list box on initial evaluation, provide pt/family education and to maximize pt's level of independence in the home and community environment.   Pt's spiritual, cultural and educational needs considered and pt agreeable to plan of care and goals.     Anticipated barriers to physical therapy: None    Goals: Short Term Goals: 3 weeks   Report decreased left shld pain  < / =  4/10 "At Worst" to increase tolerance for ADLs>Progressing   2. Pt will present with minimal restrictions left cervical musculature improving flexibility and decreasing tension reducing cervical discomfort.  4. Pt to tolerate HEP to improve ROM and independence with ADL's>MET     Long Term Goals: 6 weeks   Report decreased left shld pain  < / =  2/10 "At Worst" to increase tolerance for ADLs >PROGRESSING  2.improved limitation score to 37 % or less> PROGRESSING 39%  3. Independent with HEP for continued improvement in ROM/Strength.>PROGRESSING   4. Strength: Improve  left UE strength by 1 grade> " PROGRESSING  5. Pt will demonstrate AROM left shld WNL> PROGRESSING    Plan     Rhythmic stabilization, Post capsule stretching, Contract relax techniques.    Plan of care Certification: 5/1/2023 to 7/30/23.     Outpatient Physical Therapy 2 times weekly for 6 weeks to include the following interventions: Electrical Stimulation IFC, Manual Therapy, Moist Heat/ Ice, Neuromuscular Re-ed, Therapeutic Activities, and Therapeutic Exercise. Other modalities as appropriate.    Cuate Landers, PTA

## 2023-06-22 ENCOUNTER — CLINICAL SUPPORT (OUTPATIENT)
Dept: REHABILITATION | Facility: HOSPITAL | Age: 53
End: 2023-06-22
Attending: ORTHOPAEDIC SURGERY
Payer: MEDICAID

## 2023-06-22 DIAGNOSIS — M25.612 DECREASED ROM OF LEFT SHOULDER: ICD-10-CM

## 2023-06-22 DIAGNOSIS — G89.29 CHRONIC LEFT SHOULDER PAIN: Primary | ICD-10-CM

## 2023-06-22 DIAGNOSIS — M54.2 MUSCLE PAIN, CERVICAL: ICD-10-CM

## 2023-06-22 DIAGNOSIS — M25.522 ELBOW PAIN, LEFT: ICD-10-CM

## 2023-06-22 DIAGNOSIS — R29.898 IMPAIRED STRENGTH OF SHOULDER MUSCLES: ICD-10-CM

## 2023-06-22 DIAGNOSIS — M25.512 CHRONIC LEFT SHOULDER PAIN: Primary | ICD-10-CM

## 2023-06-22 PROCEDURE — 97110 THERAPEUTIC EXERCISES: CPT | Mod: PN

## 2023-06-22 PROCEDURE — 97035 APP MDLTY 1+ULTRASOUND EA 15: CPT | Mod: PN

## 2023-06-22 NOTE — PROGRESS NOTES
[facility]   Physical Therapy Treatment Note      Name: Nely Salas  Clinic Number: [id number]    Therapy Diagnosis:   Encounter Diagnoses   Name Primary?    Chronic left shoulder pain Yes    Muscle pain, cervical     Elbow pain, left     Decreased ROM of left shoulder     Impaired strength of shoulder muscles        Physician: Wiliam Katz DO    Visit Date: 6/22/2023    Physician Orders: PT Eval and Treat   Medical Diagnosis from Referral: Z98.890 (ICD-10-CM) - S/P arthroscopy of left shoulder  Evaluation Date: 5/1/2023  Authorization Period Expiration: 5/1/24  Plan of Care Expiration: 7/30/23  Visit # / Visits authorized: 12/ 20 (Not including Initial Evaluation)  FOTO Visit #:  1/3    PTA Visit #: 0/5     Time In: 11:00 AM  Time Out: 11:55 AM  Total Billable Time: 55 minutes    Sx:S/P arthroscopy of her left shoulder with labrum debridement rotator cuff repair, biceps tenodesis, subacromial bursectomy, and distal clavicle resection. Date of surgery 03/13/2023.     14 weeks post op  Subjective     Pt reports: pt reports a throbbing pain and stiffness in the left shoulder, neck, left elbow and bicep pain.  Improvements in pain following myofascial releases last session however the pt states the pain returned over the start of the week. Pt states that she has not been performing her HEP at home.    She was not compliant with home exercise program.  Response to previous treatment: Felt good after the last session.  Functional change: None reported.     Pain: 7/10  Location: left Shoulder and Biceps      Objective      Objective Measures updated at progress report unless specified.   Treatment     Nely received the treatments listed below:      Therapeutic Exercises: to develop ROM, flexibility, and posture for 0 minutes including:  AAROM-Pulleys x 5 min in scaption PAIN FREE  UBE x 8 mins Level 1  Left UT stretch 2 x 30 sec  Left Levator 2 x 30 sec   Seated Cervical Flexion x  15  Seated Cervical Rotation x 15   Seated Shld shrugs x 20  Seated Scapular retractions  2 x 15   Chin tucks x 20   Supine passive bicep stretch performed by PT 5 x 15sec  Seated  L Elbow Flexion AROM neutral 20x, pronated 20x, supinated 20x  Supine L Tricep Press, Yellow band, 15x   Supine L forearm pronation/supination 0# 20x  Supine AAROM Flexion with dowel to 90 deg  2 x 15  Supine L shoulder AROM flexion x 20  Supine AAROM ER to approx 50 deg d with dowel  (shld 0 deg abd) 2 x 15  Supine L shoulder Int/Ext Rotation AROM 15x  Supine Serratus Punch with 2lb Dowel x 30  Supine L shoulder isometric extension into towel roll, 15x- 5 sec each  Supine L shoulder isometric Int / Ext rotation, 15x each way  Supine L isometric elbow flexion / extension, 15x each way  Seated  uppertrap (L) stretch performed by PT 3 x 30sec  Sidelying Passive L scapular ranging in all directions performed by PT x 4mins  Seated scapular retraction 30x  Sidelying L scapular depression with manual resistance  20x  Sidelying L shoulder ext rotation 15x 2  Wall Slides : Flexion x 20 ; Abduction (pain free range) x 20   Prone Rows 3 x 10 (Modified standing) 1lb  Prone Extension 3 x 10 (Modified standing)  Prone Shoulder ABD 3 x 10 (Modified standing)  Standing :Ball on wall x 30 CW/CCW  Standing IR YTB X 15; RTB x 10  Supine PNF D1 (Seat belt) x 15  SupinePNF D2 (Sword) x 15    Consider: adding IR stretch with towel, RC isometrics    Manual x 47 mins  Minimal to moderate force manual resistance for rhythmic stabilization 90 flex/ex 2 x 30 sec; ABD/ADD 2 x 30 sec  PROM performed to the left Shoulder  - Flexion up to 173 deg  - Abd up to 130 deg  - ER up to 80 deg  -IR up to 60 deg  STM/DTM at L mid/posterior deltoid, proximal triceps, UT and levator  Strain Counter strain at the triceps and teres musculature  TrP Release at long head of tricep , distal biceps  Myofascial Release   Sub occipitals   Left Upper traps              Left Levator        "        scalenes   Left pec minor   Superficial left arm (Distal biceps and deltoid)              Left Rhomboids                KT tape to shorten left bicep tendon (discussed precautions)    Supervised Modalities after being cleared for contradictions: IFC Electrical Stimulation:  Nely received IFC Electrical Stimulation for pain control applied to the Left shoulder. Pt received stimulation for 0 minutes.     Therapeutic ultrasound   Modalities 8 mins  Ultrasound  left deltoid 1 MHZ- .8 W/CM2 Left  deltoid/tendon  Nely tolderated treatment well without any adverse effects.       cold pack for left shld for 0 minutes with Estim    Patient Education and Home Exercises       Education provided: Therapeutic exercises.    Written Home Exercises Provided: Patient instructed to cont prior HEP. Exercises were reviewed and Nely was able to demonstrate them prior to the end of the session.  Nely demonstrated good  understanding of the education provided. See EMR under Patient Instructions for exercises provided during therapy sessions    Assessment     Today's session focused pain management via PROM, myofascial releases and Ultrasound to the deltoid tendon. Low tolerance to exercise this date. Pain reduced by 50% with pt reported pain level of 3.5/10 compared to 7/10 on arrival. Improvements in passive ROM were noted in IR and ER of the left shoulder .Pt stated " This feels much better than when I arrived." Will plan to continue to reduce myofascial restrictions, progress strengthening and ROM as tolerated. Pt instructed to perform HEP exercises.     Nely Is progressing well towards her goals.   Pt prognosis is Good.     Pt will continue to benefit from skilled outpatient physical therapy to address the deficits listed in the problem list box on initial evaluation, provide pt/family education and to maximize pt's level of independence in the home and community environment.   Pt's spiritual, cultural and " "educational needs considered and pt agreeable to plan of care and goals.     Anticipated barriers to physical therapy: None    Goals: Short Term Goals: 3 weeks   Report decreased left shld pain  < / =  4/10 "At Worst" to increase tolerance for ADLs>Progressing   2. Pt will present with minimal restrictions left cervical musculature improving flexibility and decreasing tension reducing cervical discomfort.  4. Pt to tolerate HEP to improve ROM and independence with ADL's>MET     Long Term Goals: 6 weeks   Report decreased left shld pain  < / =  2/10 "At Worst" to increase tolerance for ADLs >PROGRESSING  2.improved limitation score to 37 % or less> PROGRESSING 39%  3. Independent with HEP for continued improvement in ROM/Strength.>PROGRESSING   4. Strength: Improve  left UE strength by 1 grade> PROGRESSING  5. Pt will demonstrate AROM left shld WNL> PROGRESSING    Plan     Rhythmic stabilization, Post capsule stretching, Contract relax techniques.    Plan of care Certification: 5/1/2023 to 7/30/23.     Outpatient Physical Therapy 2 times weekly for 6 weeks to include the following interventions: Electrical Stimulation IFC, Manual Therapy, Moist Heat/ Ice, Neuromuscular Re-ed, Therapeutic Activities, and Therapeutic Exercise. Other modalities as appropriate.    Mamta Escoto, PT                    "

## 2023-06-27 ENCOUNTER — CLINICAL SUPPORT (OUTPATIENT)
Dept: REHABILITATION | Facility: HOSPITAL | Age: 53
End: 2023-06-27
Attending: ORTHOPAEDIC SURGERY
Payer: MEDICAID

## 2023-06-27 DIAGNOSIS — M25.522 ELBOW PAIN, LEFT: ICD-10-CM

## 2023-06-27 DIAGNOSIS — G89.29 CHRONIC LEFT SHOULDER PAIN: Primary | ICD-10-CM

## 2023-06-27 DIAGNOSIS — M25.612 DECREASED ROM OF LEFT SHOULDER: ICD-10-CM

## 2023-06-27 DIAGNOSIS — M54.2 MUSCLE PAIN, CERVICAL: ICD-10-CM

## 2023-06-27 DIAGNOSIS — M25.512 CHRONIC LEFT SHOULDER PAIN: Primary | ICD-10-CM

## 2023-06-27 DIAGNOSIS — R29.898 IMPAIRED STRENGTH OF SHOULDER MUSCLES: ICD-10-CM

## 2023-06-27 PROCEDURE — 97110 THERAPEUTIC EXERCISES: CPT | Mod: PN

## 2023-06-27 PROCEDURE — 97140 MANUAL THERAPY 1/> REGIONS: CPT | Mod: PN

## 2023-06-27 NOTE — PROGRESS NOTES
[facility]   Physical Therapy Treatment Note /Update POC     Name: Nely Salas  Clinic Number: [id number]    Therapy Diagnosis:   Encounter Diagnoses   Name Primary?    Chronic left shoulder pain Yes    Muscle pain, cervical     Elbow pain, left     Decreased ROM of left shoulder     Impaired strength of shoulder muscles        Physician: Wiliam Katz DO    Visit Date: 6/27/2023    Physician Orders: PT Eval and Treat   Medical Diagnosis from Referral: Z98.890 (ICD-10-CM) - S/P arthroscopy of left shoulder  Evaluation Date: 5/1/2023  Authorization Period Expiration: 5/1/24  Plan of Care Expiration: 7/30/23  Visit # / Visits authorized: 12/ 20 (Not including Initial Evaluation)  FOTO Visit #:  2/3    PTA Visit #: 0/5     Time In: 11:00 AM  Time Out: 12:00 PM  Total Billable Time: 58 minutes    Sx:S/P arthroscopy of her left shoulder with labrum debridement rotator cuff repair, biceps tenodesis, subacromial bursectomy, and distal clavicle resection. Date of surgery 03/13/2023.     15 weeks post op  Subjective     Pt reports: pt reports improvements in pain of her left shoulder. Pt states that she thinks she may have been aggravated the last few sessions due to holding her infant grand son over the course of the last 2 weeks. Pt states that her neck has been really tight as well however  it was like that before the surgery.  She was not compliant with home exercise program.  Response to previous treatment: Felt good after the last session.  Functional change: None reported.     Pain: 2/10; Worst 7/10  Location: left Shoulder and Biceps      Objective      Objective Measures updated at progress report unless specified.   Shoulder Range of Motion:    Left active Left Passive   Flexion 160 170   Abduction 160 170   Extension WNL WNL   Ext. Rotation 80 80   Int. Rotation 60 65 deg   Elbow Flexion WNL --   Elbow Extension WNL              Upper Extremity Strength    Left   Shoulder  flexion: 3+/5   Shoulder Abduction: 4/5   Shoulder ER 4-/5   Shoulder IR 4/5   Wrist flexion 5/5   Wrist ext 5/5   Elbow flexion 4/5   Elbow Ext 4/5            Joint mobility: restricted at ER     Palpation:moderate tenderness to palpation at left Anterior shoulder, UT and biceps     Sensation: No numbness and tingling in the hand     Flexibility:                Upper Trap = L moderate restriction              Scalenes:  L minimal restriction              SCM:  L mod restriction-- increased              Levator Scap: L mod restriction-increased    Treatment     Nely received the treatments listed below:      Therapeutic Exercises: to develop ROM, flexibility, and posture for 23 minutes including:  AROM-Pulleys x 8 min in scaption   UBE x 8 mins Level 1  Left UT stretch 2 x 30 sec  Left Levator 2 x 30 sec   Seated Cervical Flexion x 15  Seated Cervical Rotation x 15   Seated Shld shrugs x 20  Seated Scapular retractions  2 x 15   Chin tucks x 20   Supine passive bicep stretch performed by PT 5 x 15sec  Seated  L Elbow Flexion AROM neutral 20x, pronated 20x, supinated 20x  Supine L Tricep Press, Yellow band, 15x   Supine L forearm pronation/supination 0# 20x  Supine AAROM Flexion with dowel to 90 deg  2 x 15  Supine L shoulder AROM flexion x 20  Supine AAROM ER to approx 50 deg d with dowel  (shld 0 deg abd) 2 x 15  Supine L shoulder Int/Ext Rotation AROM 15x  Supine Serratus Punch with 2lb Dowel x 30  Supine L shoulder isometric extension into towel roll, 15x- 5 sec each  Supine L shoulder isometric Int / Ext rotation, 15x each way  Supine L isometric elbow flexion / extension, 15x each way  Seated  uppertrap (L) stretch performed by PT 3 x 30sec  Sidelying Passive L scapular ranging in all directions performed by PT x 4mins  Seated scapular retraction 30x  Sidelying L scapular depression with manual resistance  20x  Sidelying L shoulder ext rotation 15x 2  Wall Slides : Flexion x 20 ; Abduction (pain free range)  x 20   Prone Rows 3 x 10 (Modified standing) 1lb  Prone Extension 3 x 10 (Modified standing) 1 lb  Prone Shoulder ABD 3 x 10 (Modified standing)  Standing shld ext with dowel x15  Standing rows x 15  Standing shld Ext with RTB--INITIAL/ Add as tolerated  Standing shld ER RTB--INITIAL/Add as tolerated  Standing :Ball on wall x 30 CW/CCW  Standing IR YTB X 15; RTB x 10  Supine PNF D1 (Seat belt) x 15  SupinePNF D2 (Sword) x 15  Updated HEP provided and reviewed    Consider: adding IR stretch with towel, RC isometrics    Manual x  32 mins  Minimal to moderate force manual resistance for rhythmic stabilization 90 flex/ex 2 x 30 sec; ABD/ADD 2 x 30 sec  PROM performed to the left Shoulder  - Flexion up to 175 deg  - Abd up to 170 deg  - ER up to 80 deg  -IR up to 65 deg  STM/DTM at L mid/posterior deltoid, proximal triceps, UT and levator  Strain Counter strain at the triceps and teres musculature  TrP Release at long head of tricep   Myofascial Release   Sub occipitals   Left Upper traps              Left Levator               scalenes   Left pec minor   Superficial left arm (Distal deltoid)              Left Rhomboids                KT tape to shorten left bicep tendon (discussed precautions)    Supervised Modalities after being cleared for contradictions: IFC Electrical Stimulation:  Nely received IFC Electrical Stimulation for pain control applied to the Left shoulder. Pt received stimulation for 0 minutes.     Therapeutic ultrasound   Modalities 8 mins  Ultrasound  left deltoid 1 MHZ- .8 W/CM2 Left  deltoid/tendon  Nely tolderated treatment well without any adverse effects.       cold pack for left shld for 0 minutes with Estim    Patient Education and Home Exercises       Education provided: Therapeutic exercises.    Written Home Exercises Provided: Patient instructed to cont prior HEP. Exercises were reviewed and Nely was able to demonstrate them prior to the end of the session.  Nely demonstrated good  " understanding of the education provided. See EMR under Patient Instructions for exercises provided during therapy sessions    Assessment    Today's session continued with focus on ROM and interventions to reduce tissue tightness and alleviate pain at the elbow ans shoulder. Pt tolerated resuming above exercises . Shoulder ROM remains slightly limited with overhead motions however increased flexion, IR, ER and abd noted.Further improvements in general strength and functional status. Tightness of tissue remains throughout posterior shoulder girdle ant shoulder/pec, UT, levator, Rhomboids etc. No exacerbations reported. Pt recently presented with a set back over the last twos weeks after repetitively picking up and holding her grand son. Pain reduced and pt continues to progress slowly. Pt will continue to benefit from skilled PT services to address deficits indicated and listed on evaluation.    Nely Is progressing well towards her goals.   Pt prognosis is Good.     Pt will continue to benefit from skilled outpatient physical therapy to address the deficits listed in the problem list box on initial evaluation, provide pt/family education and to maximize pt's level of independence in the home and community environment.   Pt's spiritual, cultural and educational needs considered and pt agreeable to plan of care and goals.     Anticipated barriers to physical therapy: None    Goals: Short Term Goals: 3 weeks   Report decreased left shld pain  < / =  4/10 "At Worst" to increase tolerance for ADLs>Progressing   2. Pt will present with minimal restrictions left cervical musculature improving flexibility and decreasing tension reducing cervical discomfort.> PROGRESSING  4. Pt to tolerate HEP to improve ROM and independence with ADL's>MET     Long Term Goals: 6 weeks   Report decreased left shld pain  < / =  2/10 "At Worst" to increase tolerance for ADLs >PROGRESSING  2.improved limitation score to 37 % or less> " PROGRESSING 39%  3. Independent with HEP for continued improvement in ROM/Strength.>PROGRESSING   4. Strength: Improve  left UE strength by 1 grade> PROGRESSING  5. Pt will demonstrate AROM left shld WNL> PROGRESSING    Plan     Extended Plan of care Certification: 5/1/2023 to 7/30/23.     Outpatient Physical Therapy 2 times weekly for 5 weeks to include the following interventions: Electrical Stimulation IFC, Manual Therapy, Moist Heat/ Ice, Neuromuscular Re-ed, Therapeutic Activities, and Therapeutic Exercise. Other modalities as appropriate.    Mamta Escoto, PT

## 2023-06-30 DIAGNOSIS — D50.9 IRON DEFICIENCY ANEMIA, UNSPECIFIED IRON DEFICIENCY ANEMIA TYPE: Primary | ICD-10-CM

## 2023-07-03 DIAGNOSIS — D50.9 IRON DEFICIENCY ANEMIA, UNSPECIFIED IRON DEFICIENCY ANEMIA TYPE: Primary | ICD-10-CM

## 2023-07-05 DIAGNOSIS — D50.9 IRON DEFICIENCY ANEMIA, UNSPECIFIED IRON DEFICIENCY ANEMIA TYPE: Primary | ICD-10-CM

## 2023-07-06 ENCOUNTER — LAB VISIT (OUTPATIENT)
Dept: LAB | Facility: HOSPITAL | Age: 53
End: 2023-07-06
Attending: NURSE PRACTITIONER
Payer: MEDICAID

## 2023-07-06 ENCOUNTER — CLINICAL SUPPORT (OUTPATIENT)
Dept: REHABILITATION | Facility: HOSPITAL | Age: 53
End: 2023-07-06
Attending: ORTHOPAEDIC SURGERY
Payer: MEDICAID

## 2023-07-06 DIAGNOSIS — M25.512 CHRONIC LEFT SHOULDER PAIN: Primary | ICD-10-CM

## 2023-07-06 DIAGNOSIS — M54.2 MUSCLE PAIN, CERVICAL: ICD-10-CM

## 2023-07-06 DIAGNOSIS — M25.522 ELBOW PAIN, LEFT: ICD-10-CM

## 2023-07-06 DIAGNOSIS — M25.612 DECREASED ROM OF LEFT SHOULDER: ICD-10-CM

## 2023-07-06 DIAGNOSIS — R29.898 IMPAIRED STRENGTH OF SHOULDER MUSCLES: ICD-10-CM

## 2023-07-06 DIAGNOSIS — D50.9 IRON DEFICIENCY ANEMIA, UNSPECIFIED IRON DEFICIENCY ANEMIA TYPE: ICD-10-CM

## 2023-07-06 DIAGNOSIS — G89.29 CHRONIC LEFT SHOULDER PAIN: Primary | ICD-10-CM

## 2023-07-06 LAB
BASOPHILS # BLD AUTO: 0.03 K/UL (ref 0–0.2)
BASOPHILS NFR BLD: 0.5 % (ref 0–1.9)
DIFFERENTIAL METHOD: ABNORMAL
EOSINOPHIL # BLD AUTO: 0.4 K/UL (ref 0–0.5)
EOSINOPHIL NFR BLD: 6.1 % (ref 0–8)
ERYTHROCYTE [DISTWIDTH] IN BLOOD BY AUTOMATED COUNT: 20.2 % (ref 11.5–14.5)
HCT VFR BLD AUTO: 35.7 % (ref 37–48.5)
HGB BLD-MCNC: 11 G/DL (ref 12–16)
IMM GRANULOCYTES # BLD AUTO: 0.02 K/UL (ref 0–0.04)
IMM GRANULOCYTES NFR BLD AUTO: 0.3 % (ref 0–0.5)
IRON SERPL-MCNC: 39 UG/DL (ref 30–160)
LYMPHOCYTES # BLD AUTO: 2 K/UL (ref 1–4.8)
LYMPHOCYTES NFR BLD: 32.3 % (ref 18–48)
MCH RBC QN AUTO: 22.5 PG (ref 27–31)
MCHC RBC AUTO-ENTMCNC: 30.8 G/DL (ref 32–36)
MCV RBC AUTO: 73 FL (ref 82–98)
MONOCYTES # BLD AUTO: 0.4 K/UL (ref 0.3–1)
MONOCYTES NFR BLD: 6.4 % (ref 4–15)
NEUTROPHILS # BLD AUTO: 3.4 K/UL (ref 1.8–7.7)
NEUTROPHILS NFR BLD: 54.4 % (ref 38–73)
NRBC BLD-RTO: 0 /100 WBC
PLATELET # BLD AUTO: 223 K/UL (ref 150–450)
PMV BLD AUTO: 11.3 FL (ref 9.2–12.9)
RBC # BLD AUTO: 4.88 M/UL (ref 4–5.4)
SATURATED IRON: 7 % (ref 20–50)
TOTAL IRON BINDING CAPACITY: 531 UG/DL (ref 250–450)
TRANSFERRIN SERPL-MCNC: 359 MG/DL (ref 200–375)
WBC # BLD AUTO: 6.22 K/UL (ref 3.9–12.7)

## 2023-07-06 PROCEDURE — 82728 ASSAY OF FERRITIN: CPT | Performed by: NURSE PRACTITIONER

## 2023-07-06 PROCEDURE — 36415 COLL VENOUS BLD VENIPUNCTURE: CPT | Performed by: NURSE PRACTITIONER

## 2023-07-06 PROCEDURE — 84466 ASSAY OF TRANSFERRIN: CPT | Performed by: NURSE PRACTITIONER

## 2023-07-06 PROCEDURE — 97140 MANUAL THERAPY 1/> REGIONS: CPT | Mod: PN,CQ

## 2023-07-06 PROCEDURE — 97110 THERAPEUTIC EXERCISES: CPT | Mod: PN,CQ

## 2023-07-06 PROCEDURE — 85025 COMPLETE CBC W/AUTO DIFF WBC: CPT | Performed by: NURSE PRACTITIONER

## 2023-07-06 NOTE — PROGRESS NOTES
[facility]   Physical Therapy Treatment Note /Update POC     Name: Nely Salas  Clinic Number: [id number]    Therapy Diagnosis:   Encounter Diagnoses   Name Primary?    Chronic left shoulder pain Yes    Muscle pain, cervical     Elbow pain, left     Decreased ROM of left shoulder     Impaired strength of shoulder muscles        Physician: Wiliam Katz DO    Visit Date: 7/6/2023    Physician Orders: PT Eval and Treat   Medical Diagnosis from Referral: Z98.890 (ICD-10-CM) - S/P arthroscopy of left shoulder  Evaluation Date: 5/1/2023  Authorization Period Expiration: 5/1/24  Plan of Care Expiration: 7/30/23  Visit # / Visits authorized: 13/ 20 (Not including Initial Evaluation)  FOTO Visit #:  2/3    PTA Visit #: 1/5     Time In: 10:05 AM  Time Out: 11:10 AM  Total Billable Time: 53 minutes    Sx:S/P arthroscopy of her left shoulder with labrum debridement rotator cuff repair, biceps tenodesis, subacromial bursectomy, and distal clavicle resection. Date of surgery 03/13/2023.     15 weeks post op  Subjective     Pt reports: No new c/o's.  She was not compliant with home exercise program.  Response to previous treatment: Felt good after the last session.  Functional change: None reported.     Pain: 4/10; Worst 7/10  Location: left Shoulder and Biceps      Objective      Objective Measures updated at progress report unless specified.   Shoulder Range of Motion:    Left active Left Passive   Flexion 160 170   Abduction 160 170   Extension WNL WNL   Ext. Rotation 80 80   Int. Rotation 60 65 deg   Elbow Flexion WNL --   Elbow Extension WNL              Upper Extremity Strength    Left   Shoulder flexion: 3+/5   Shoulder Abduction: 4/5   Shoulder ER 4-/5   Shoulder IR 4/5   Wrist flexion 5/5   Wrist ext 5/5   Elbow flexion 4/5   Elbow Ext 4/5            Joint mobility: restricted at ER     Palpation:moderate tenderness to palpation at left Anterior shoulder, UT and biceps     Sensation:  No numbness and tingling in the hand     Flexibility:                Upper Trap = L moderate restriction              Scalenes:  L minimal restriction              SCM:  L mod restriction-- increased              Levator Scap: L mod restriction-increased    Treatment     Nely received the treatments listed below:      Therapeutic Exercises: to develop ROM, flexibility, and posture for 23 minutes including:  AROM-Pulleys x 8 min in scaption   UBE x 8 mins Level 1  Left UT stretch 2 x 30 sec  Left Levator 2 x 30 sec   Seated Cervical Flexion x 15  Seated Cervical Rotation x 15   Seated Shld shrugs x 20  Seated Scapular retractions  2 x 15   Chin tucks x 20   Supine passive bicep stretch performed by PT 5 x 15sec  Seated  L Elbow Flexion AROM neutral 20x, pronated 20x, supinated 20x  Supine L Tricep Press, Yellow band, 15x   Supine L forearm pronation/supination 0# 20x  Supine AAROM Flexion with dowel to 90 deg  2 x 15  Supine L shoulder AROM flexion x 20  Supine AAROM ER to approx 50 deg d with dowel  (shld 0 deg abd) 2 x 15  Supine L shoulder Int/Ext Rotation AROM 15x  Supine Serratus Punch with 2lb Dowel x 30  Supine L shoulder isometric extension into towel roll, 15x- 5 sec each  Supine L shoulder isometric Int / Ext rotation, 15x each way  Supine L isometric elbow flexion / extension, 15x each way  Seated  uppertrap (L) stretch performed by PT 3 x 30sec  Sidelying Passive L scapular ranging in all directions performed by PT x 4mins  Seated scapular retraction 30x  Sidelying L scapular depression with manual resistance  20x  Sidelying L shoulder ext rotation 15x 2  Wall Slides : Flexion x 20 ; Abduction (pain free range) x 20   Prone Rows 3 x 10 (Modified standing) 1lb  Prone Extension 3 x 10 (Modified standing) 1 lb  Prone Shoulder ABD 3 x 10 (Modified standing)  Standing shld ext with dowel x 15  Standing rows x 15  Standing shld Ext with RTB--INITIAL/ Add as tolerated  Standing shld ER RTB--INITIAL/Add as  tolerated  Standing :Ball on wall x 30 CW/CCW  Standing IR YTB X 15; RTB x 10  Supine PNF D1 (Seat belt) x 15  SupinePNF D2 (Sword) x 15  Updated HEP provided and reviewed    Consider: adding IR stretch with towel, RC isometrics    Manual x 30 mins  Minimal to moderate force manual resistance for rhythmic stabilization 90 flex/ex 2 x 30 sec; ABD/ADD 2 x 30 sec  PROM performed to the left Shoulder  - Flexion up to 175 deg  - Abd up to 170 deg  - ER up to 80 deg  -IR up to 65 deg  STM/DTM at L mid/posterior deltoid, proximal triceps, UT and levator  Strain Counter strain at the triceps and teres musculature  TrP Release at long head of tricep   Myofascial Release   Sub occipitals   Left Upper traps              Left Levator               scalenes   Left pec minor   Superficial left arm (Distal deltoid)              Left Rhomboids                KT tape to shorten left bicep tendon (discussed precautions)    Supervised Modalities after being cleared for contradictions: IFC Electrical Stimulation:  Nely received IFC Electrical Stimulation for pain control applied to the Left shoulder. Pt received stimulation for 0 minutes.     Therapeutic ultrasound   Modalities 8 mins  Ultrasound  left deltoid 1 MHZ- .8 W/CM2 Left  deltoid/tendon  Nely tolderated treatment well without any adverse effects.       cold pack for left shld for 0 minutes with Estim    Patient Education and Home Exercises       Education provided: Therapeutic exercises.    Written Home Exercises Provided: Patient instructed to cont prior HEP. Exercises were reviewed and Nely was able to demonstrate them prior to the end of the session.  Nely demonstrated good  understanding of the education provided. See EMR under Patient Instructions for exercises provided during therapy sessions    Assessment    Today's session continued with focus on ROM and interventions to reduce tissue tightness and alleviate pain at the elbow and shoulder. Pt tolerated  "above exercises . Shoulder ROM remains slightly limited with overhead motions however increased flexion, IR, ER and abd noted.Further improvements in general strength and functional status. Tightness of tissue remains throughout posterior shoulder girdle ant shoulder/pec, UT, levator, Rhomboids etc. No exacerbations reported. Pt recently presented with a set back over the last twos weeks after repetitively picking up and holding her grand son. Pain reduced and pt continues to progress slowly. Pt will continue to benefit from skilled PT services to address deficits indicated and listed on evaluation.    Nely Is progressing well towards her goals.   Pt prognosis is Good.     Pt will continue to benefit from skilled outpatient physical therapy to address the deficits listed in the problem list box on initial evaluation, provide pt/family education and to maximize pt's level of independence in the home and community environment.   Pt's spiritual, cultural and educational needs considered and pt agreeable to plan of care and goals.     Anticipated barriers to physical therapy: None    Goals: Short Term Goals: 3 weeks   Report decreased left shld pain  < / =  4/10 "At Worst" to increase tolerance for ADLs>Progressing   2. Pt will present with minimal restrictions left cervical musculature improving flexibility and decreasing tension reducing cervical discomfort.> PROGRESSING  4. Pt to tolerate HEP to improve ROM and independence with ADL's>MET     Long Term Goals: 6 weeks   Report decreased left shld pain  < / =  2/10 "At Worst" to increase tolerance for ADLs >PROGRESSING  2.improved limitation score to 37 % or less> PROGRESSING 39%  3. Independent with HEP for continued improvement in ROM/Strength.>PROGRESSING   4. Strength: Improve  left UE strength by 1 grade> PROGRESSING  5. Pt will demonstrate AROM left shld WNL> PROGRESSING    Plan     Extended Plan of care Certification: 5/1/2023 to 7/30/23.     Outpatient " Physical Therapy 2 times weekly for 5 weeks to include the following interventions: Electrical Stimulation IFC, Manual Therapy, Moist Heat/ Ice, Neuromuscular Re-ed, Therapeutic Activities, and Therapeutic Exercise. Other modalities as appropriate.    Jonathan Favre, PTA

## 2023-07-07 LAB — FERRITIN SERPL-MCNC: 14 NG/ML (ref 20–300)

## 2023-07-10 ENCOUNTER — OFFICE VISIT (OUTPATIENT)
Dept: HEMATOLOGY/ONCOLOGY | Facility: CLINIC | Age: 53
End: 2023-07-10
Payer: MEDICAID

## 2023-07-10 VITALS
DIASTOLIC BLOOD PRESSURE: 76 MMHG | TEMPERATURE: 98 F | RESPIRATION RATE: 18 BRPM | OXYGEN SATURATION: 99 % | BODY MASS INDEX: 47.53 KG/M2 | SYSTOLIC BLOOD PRESSURE: 141 MMHG | HEIGHT: 65 IN | WEIGHT: 285.31 LBS | HEART RATE: 75 BPM

## 2023-07-10 DIAGNOSIS — D50.0 IRON DEFICIENCY ANEMIA DUE TO CHRONIC BLOOD LOSS: ICD-10-CM

## 2023-07-10 DIAGNOSIS — D50.9 IRON DEFICIENCY ANEMIA, UNSPECIFIED IRON DEFICIENCY ANEMIA TYPE: ICD-10-CM

## 2023-07-10 PROCEDURE — 1159F MED LIST DOCD IN RCRD: CPT | Mod: CPTII,,, | Performed by: NURSE PRACTITIONER

## 2023-07-10 PROCEDURE — 99204 PR OFFICE/OUTPT VISIT, NEW, LEVL IV, 45-59 MIN: ICD-10-PCS | Mod: S$PBB,,, | Performed by: NURSE PRACTITIONER

## 2023-07-10 PROCEDURE — 4010F ACE/ARB THERAPY RXD/TAKEN: CPT | Mod: CPTII,,, | Performed by: NURSE PRACTITIONER

## 2023-07-10 PROCEDURE — 3008F PR BODY MASS INDEX (BMI) DOCUMENTED: ICD-10-PCS | Mod: CPTII,,, | Performed by: NURSE PRACTITIONER

## 2023-07-10 PROCEDURE — 1160F RVW MEDS BY RX/DR IN RCRD: CPT | Mod: CPTII,,, | Performed by: NURSE PRACTITIONER

## 2023-07-10 PROCEDURE — 3077F PR MOST RECENT SYSTOLIC BLOOD PRESSURE >= 140 MM HG: ICD-10-PCS | Mod: CPTII,,, | Performed by: NURSE PRACTITIONER

## 2023-07-10 PROCEDURE — 99214 OFFICE O/P EST MOD 30 MIN: CPT | Mod: PBBFAC | Performed by: NURSE PRACTITIONER

## 2023-07-10 PROCEDURE — 3078F PR MOST RECENT DIASTOLIC BLOOD PRESSURE < 80 MM HG: ICD-10-PCS | Mod: CPTII,,, | Performed by: NURSE PRACTITIONER

## 2023-07-10 PROCEDURE — 99204 OFFICE O/P NEW MOD 45 MIN: CPT | Mod: S$PBB,,, | Performed by: NURSE PRACTITIONER

## 2023-07-10 PROCEDURE — 3077F SYST BP >= 140 MM HG: CPT | Mod: CPTII,,, | Performed by: NURSE PRACTITIONER

## 2023-07-10 PROCEDURE — 99999 PR PBB SHADOW E&M-EST. PATIENT-LVL IV: CPT | Mod: PBBFAC,,, | Performed by: NURSE PRACTITIONER

## 2023-07-10 PROCEDURE — 99999 PR PBB SHADOW E&M-EST. PATIENT-LVL IV: ICD-10-PCS | Mod: PBBFAC,,, | Performed by: NURSE PRACTITIONER

## 2023-07-10 PROCEDURE — 3008F BODY MASS INDEX DOCD: CPT | Mod: CPTII,,, | Performed by: NURSE PRACTITIONER

## 2023-07-10 PROCEDURE — 1160F PR REVIEW ALL MEDS BY PRESCRIBER/CLIN PHARMACIST DOCUMENTED: ICD-10-PCS | Mod: CPTII,,, | Performed by: NURSE PRACTITIONER

## 2023-07-10 PROCEDURE — 1159F PR MEDICATION LIST DOCUMENTED IN MEDICAL RECORD: ICD-10-PCS | Mod: CPTII,,, | Performed by: NURSE PRACTITIONER

## 2023-07-10 PROCEDURE — 3078F DIAST BP <80 MM HG: CPT | Mod: CPTII,,, | Performed by: NURSE PRACTITIONER

## 2023-07-10 PROCEDURE — 4010F PR ACE/ARB THEARPY RXD/TAKEN: ICD-10-PCS | Mod: CPTII,,, | Performed by: NURSE PRACTITIONER

## 2023-07-10 RX ORDER — SODIUM CHLORIDE 0.9 % (FLUSH) 0.9 %
10 SYRINGE (ML) INJECTION
Status: CANCELLED | OUTPATIENT
Start: 2023-07-10

## 2023-07-10 RX ORDER — EPINEPHRINE 0.3 MG/.3ML
0.3 INJECTION SUBCUTANEOUS ONCE AS NEEDED
Status: CANCELLED | OUTPATIENT
Start: 2023-07-10

## 2023-07-10 RX ORDER — SODIUM CHLORIDE 9 MG/ML
INJECTION, SOLUTION INTRAVENOUS CONTINUOUS
Status: CANCELLED | OUTPATIENT
Start: 2023-07-10

## 2023-07-10 RX ORDER — PANTOPRAZOLE SODIUM 20 MG/1
20 TABLET, DELAYED RELEASE ORAL DAILY
Qty: 30 TABLET | Refills: 1 | Status: SHIPPED | OUTPATIENT
Start: 2023-07-10 | End: 2024-04-01

## 2023-07-10 RX ORDER — HEPARIN 100 UNIT/ML
5 SYRINGE INTRAVENOUS
Status: CANCELLED | OUTPATIENT
Start: 2023-07-10

## 2023-07-10 RX ORDER — DIPHENHYDRAMINE HYDROCHLORIDE 50 MG/ML
50 INJECTION INTRAMUSCULAR; INTRAVENOUS ONCE AS NEEDED
Status: CANCELLED | OUTPATIENT
Start: 2023-07-10

## 2023-07-10 NOTE — PROGRESS NOTES
Intial Visit New Patient: Nely is a 53 y.o. female who has self-referred to [facility]ology [facility] for iron deficiency anemia.  She still has her cycles and reports occasional menorrhagia with them.  Patient reports she had colonoscopy approximately 2 years ago an upper GI scope last year.  She had these performed at [facility] in [place].  He is unable to tolerate oral iron due to GI side effects.  She has received Injectafer with good results in the past        Review of Systems   Constitutional:  Positive for malaise/fatigue.   HENT: Negative.     Eyes: Negative.    Respiratory: Negative.     Cardiovascular: Negative.    Genitourinary: Negative.    Skin: Negative.       Past Medical History:   Diagnosis Date    Anemia, unspecified     Diabetes mellitus, type 2     Diabetic neuropathy     GERD (gastroesophageal reflux disease)     Hypertension     Sleep apnea       Patient Active Problem List   Diagnosis    Iron deficiency anemia    Morbid obesity with body mass index (BMI) of 40.0 to 49.9    Chronic low back pain    Acute cystitis    Greater trochanteric bursitis, left    Primary osteoarthritis of both hips    Radicular pain of left lower extremity    Fissure in skin    Osteoarthritis of ankle and foot    Neuritis    Fibromyalgia    Fissure in skin of foot    Type 2 diabetes mellitus without complication, without long-term current use of insulin    Acute gout of shoulder    Type II diabetes mellitus with neurological manifestations    Comprehensive diabetic foot examination, type 2 DM, encounter for    Atopic neurodermatitis    Trigger finger of left thumb    Traumatic incomplete tear of left rotator cuff    Superior labrum anterior-to-posterior (SLAP) tear of left shoulder    Chronic left shoulder pain    Muscle pain, cervical    Elbow pain, left    Decreased ROM of left shoulder    Impaired strength of shoulder muscles    Recurrent candidiasis of vagina    Cutaneous candidiasis      Past Surgical History:   Procedure  Laterality Date    ARTHROSCOPY OF SHOULDER WITH DECOMPRESSION OF SUBACROMIAL SPACE Left 3/13/2023    Procedure: ARTHROSCOPY, SHOULDER, WITH SUBACROMIAL SPACE DECOMPRESSION;  Surgeon: Wiliam Katz DO;  Location: [facility] OR;  Service: Orthopedics;  Laterality: Left;    ARTHROSCOPY OF SHOULDER WITH REMOVAL OF DISTAL CLAVICLE Left 3/13/2023    Procedure: ARTHROSCOPY, SHOULDER, WITH DISTAL CLAVICLE EXCISION;  Surgeon: Wiliam Katz DO;  Location: [facility] OR;  Service: Orthopedics;  Laterality: Left;    ARTHROSCOPY,SHOULDER,WITH BICEPS TENODESIS Left 3/13/2023    Procedure: ARTHROSCOPY,SHOULDER,WITH BICEPS TENODESIS;  Surgeon: Wiliam Katz DO;  Location: [facility] OR;  Service: Orthopedics;  Laterality: Left;     SECTION      CHOLECYSTECTOMY      FALLOPIAN TUBOPLASTY      SHOULDER ARTHROSCOPY Left 3/13/2023    Procedure: Arhtroscopy Left Shouler with Possible Open Rotator Cuff Repair;  Surgeon: Wiliam Katz DO;  Location: [facility] OR;  Service: Orthopedics;  Laterality: Left;    TRIGGER FINGER RELEASE Left 2022    Procedure: TRIGGER FINGER RELEASE LEFT THUMB;  Surgeon: Wiliam Katz DO;  Location: [facility] OR;  Service: Orthopedics;  Laterality: Left;      Current Outpatient Medications   Medication Sig Dispense Refill    cetirizine (ZYRTEC) 10 MG tablet Take 10 mg by mouth once daily.      EASY TOUCH TWIST LANCETS 30 gauge Misc MONITOR BLOOD GLUCOSE DAILY.      esomeprazole (NEXIUM) 20 MG capsule Take 20 mg by mouth before breakfast.      lisinopriL (PRINIVIL,ZESTRIL) 20 MG tablet Take by mouth.      oxyCODONE (ROXICODONE) 15 MG Tab Take 15 mg by mouth every 4 (four) hours as needed.      pregabalin (LYRICA) 100 MG capsule Take 100 mg by mouth 2 (two) times daily.      promethazine (PHENERGAN) 25 MG tablet TAKE ONE TABLET BY MOUTH EVERY NIGHT AT BEDTIME AS NEEDED FOR NAUSEA AND VOMITING      traZODone (DESYREL) 100 MG tablet Take 200 mg by mouth nightly as needed.      zolpidem (AMBIEN) 10 mg Tab Take 10  mg by mouth nightly as needed.       No current facility-administered medications for this visit.      Review of patient's allergies indicates:   Allergen Reactions    Bactrim [sulfamethoxazole-trimethoprim] Nausea And Vomiting    Zithromax [azithromycin] Nausea And Vomiting    Clotrimazole-betamethasone Other (See Comments)     Makes patient's feet red and cracks the skin more    Ketorolac Other (See Comments)     PT STATES IT DOES NOT WORK FOR HER    Morphine Hives    Potassium clavulanate      Other reaction(s): Unknown    Sulfamethoxazole Other (See Comments)    Trimethoprim Other (See Comments)    Amoxicillin trihydrate Nausea Only and Other (See Comments)    Sulfa (sulfonamide antibiotics) Nausea And Vomiting     PHYSICAL EXAM:     Vitals:    07/10/23 1317   BP: (!) 141/76   Pulse: 75   Resp: 18   Temp: 98.1 °F (36.7 °C)       GENERAL: Comfortable looking patient. Patient is in no distress.  Awake, alert and oriented to time, person and place.  No anxiety, or agitation.      HEENT: Normal conjunctivae and eyelids. WNL.  PERRLA 3 to 4 mm. No icterus, no pallor, no congestion, and no discharge noted.     NECK:  Supple. Trachea is central.  No crepitus.  No JVD or masses.    RESPIRATORY:  No intercostal retractions.  No dullness to percussion.  Chest is clear to auscultation.  No rales, rhonchi or wheezes.  No crepitus.  Good air entry bilaterally.    CARDIOVASCULAR:  S1 and S2 are normally heard without murmurs or gallops.  All peripheral pulses are present.    ABDOMEN:  Normal abdomen.  No hepatosplenomegaly.  No free fluid.  Bowel sounds are present.  No hernia noted. No masses.  No rebound or tenderness.  No guarding or rigidity.  Umbilicus is midline.    LYMPHATICS:  No axillary, cervical, supraclavicular, submental, or inguinal lymphadenopathy.    SKIN/MUSCULOSKELETAL:  There is no evidence of excoriation marks or ecchmosis.  No rashes.  No cyanosis.  No clubbing.  No joint or skeletal deformities noted.   Normal range of motion.    NEUROLOGIC:  Higher functions are appropriate.  No cranial nerve deficits.  Normal yarelis.  Normal strength.  Motor and sensory functions are normal.  Deep tendon reflexes are normal.    GENITAL/RECTAL:  Exams are deferred.       Assessment/plan:     Iron deficiency anemia:   -unable to tolerate oral iron  -recommend Injectafer 750 mg IV x2 doses  -obtain endoscopy results from [facility] in [place]   -see me 8 weeks after 2nd dose of Injectafer with labs    Acid reflux:   -Protonix 20 mg p.o. daily  -patient states she is no longer on Nexium

## 2023-07-10 NOTE — Clinical Note
Obtain copy of gi scopes from [facility] in [place]  Get auth for injectafer x2 See me 8 weeks after 2 nd iron with labs

## 2023-07-11 ENCOUNTER — CLINICAL SUPPORT (OUTPATIENT)
Dept: REHABILITATION | Facility: HOSPITAL | Age: 53
End: 2023-07-11
Attending: ORTHOPAEDIC SURGERY
Payer: MEDICAID

## 2023-07-11 ENCOUNTER — INFUSION (OUTPATIENT)
Dept: INFUSION THERAPY | Facility: HOSPITAL | Age: 53
End: 2023-07-11
Attending: ORTHOPAEDIC SURGERY
Payer: MEDICAID

## 2023-07-11 ENCOUNTER — PATIENT MESSAGE (OUTPATIENT)
Dept: HEMATOLOGY/ONCOLOGY | Facility: CLINIC | Age: 53
End: 2023-07-11
Payer: MEDICAID

## 2023-07-11 VITALS
TEMPERATURE: 98 F | HEART RATE: 64 BPM | SYSTOLIC BLOOD PRESSURE: 152 MMHG | RESPIRATION RATE: 18 BRPM | DIASTOLIC BLOOD PRESSURE: 89 MMHG

## 2023-07-11 DIAGNOSIS — M25.512 CHRONIC LEFT SHOULDER PAIN: Primary | ICD-10-CM

## 2023-07-11 DIAGNOSIS — M25.522 ELBOW PAIN, LEFT: ICD-10-CM

## 2023-07-11 DIAGNOSIS — R29.898 IMPAIRED STRENGTH OF SHOULDER MUSCLES: ICD-10-CM

## 2023-07-11 DIAGNOSIS — M54.2 MUSCLE PAIN, CERVICAL: ICD-10-CM

## 2023-07-11 DIAGNOSIS — M25.612 DECREASED ROM OF LEFT SHOULDER: ICD-10-CM

## 2023-07-11 DIAGNOSIS — G89.29 CHRONIC LEFT SHOULDER PAIN: Primary | ICD-10-CM

## 2023-07-11 DIAGNOSIS — D50.0 IRON DEFICIENCY ANEMIA DUE TO CHRONIC BLOOD LOSS: Primary | ICD-10-CM

## 2023-07-11 PROCEDURE — A4216 STERILE WATER/SALINE, 10 ML: HCPCS | Performed by: NURSE PRACTITIONER

## 2023-07-11 PROCEDURE — 97140 MANUAL THERAPY 1/> REGIONS: CPT | Mod: PN,CQ

## 2023-07-11 PROCEDURE — 63600175 PHARM REV CODE 636 W HCPCS: Mod: UD | Performed by: NURSE PRACTITIONER

## 2023-07-11 PROCEDURE — 97110 THERAPEUTIC EXERCISES: CPT | Mod: PN,CQ

## 2023-07-11 PROCEDURE — 96365 THER/PROPH/DIAG IV INF INIT: CPT

## 2023-07-11 PROCEDURE — 25000003 PHARM REV CODE 250: Mod: UD | Performed by: NURSE PRACTITIONER

## 2023-07-11 RX ORDER — SODIUM CHLORIDE 9 MG/ML
INJECTION, SOLUTION INTRAVENOUS CONTINUOUS
Status: DISCONTINUED | OUTPATIENT
Start: 2023-07-11 | End: 2023-07-11 | Stop reason: HOSPADM

## 2023-07-11 RX ORDER — SODIUM CHLORIDE 0.9 % (FLUSH) 0.9 %
10 SYRINGE (ML) INJECTION
Status: DISCONTINUED | OUTPATIENT
Start: 2023-07-11 | End: 2023-07-11 | Stop reason: HOSPADM

## 2023-07-11 RX ORDER — EPINEPHRINE 0.3 MG/.3ML
0.3 INJECTION SUBCUTANEOUS ONCE AS NEEDED
OUTPATIENT
Start: 2023-07-18

## 2023-07-11 RX ORDER — HEPARIN 100 UNIT/ML
5 SYRINGE INTRAVENOUS
OUTPATIENT
Start: 2023-07-18

## 2023-07-11 RX ORDER — SODIUM CHLORIDE 9 MG/ML
INJECTION, SOLUTION INTRAVENOUS CONTINUOUS
OUTPATIENT
Start: 2023-07-18

## 2023-07-11 RX ORDER — DIPHENHYDRAMINE HYDROCHLORIDE 50 MG/ML
50 INJECTION INTRAMUSCULAR; INTRAVENOUS ONCE AS NEEDED
OUTPATIENT
Start: 2023-07-18

## 2023-07-11 RX ORDER — HEPARIN 100 UNIT/ML
5 SYRINGE INTRAVENOUS
Status: DISCONTINUED | OUTPATIENT
Start: 2023-07-11 | End: 2023-07-11 | Stop reason: HOSPADM

## 2023-07-11 RX ORDER — SODIUM CHLORIDE 0.9 % (FLUSH) 0.9 %
10 SYRINGE (ML) INJECTION
OUTPATIENT
Start: 2023-07-18

## 2023-07-11 RX ADMIN — Medication 10 ML: at 12:07

## 2023-07-11 RX ADMIN — FERRIC CARBOXYMALTOSE INJECTION 750 MG: 50 INJECTION, SOLUTION INTRAVENOUS at 11:07

## 2023-07-11 NOTE — PLAN OF CARE
Pleasant alert and oriented patient ambulated to clinic for injectafer - pt tolerated well - discharged home with no concerns - pt to RTC next week

## 2023-07-11 NOTE — PROGRESS NOTES
[facility]   Physical Therapy Treatment Note /Update POC     Name: Nely Salas  Clinic Number: [id number]    Therapy Diagnosis:   Encounter Diagnoses   Name Primary?    Chronic left shoulder pain Yes    Muscle pain, cervical     Elbow pain, left     Decreased ROM of left shoulder     Impaired strength of shoulder muscles        Physician: Wiliam Katz DO    Visit Date: 7/11/2023    Physician Orders: PT Eval and Treat   Medical Diagnosis from Referral: Z98.890 (ICD-10-CM) - S/P arthroscopy of left shoulder  Evaluation Date: 5/1/2023  Authorization Period Expiration: 5/1/24  Plan of Care Expiration: 7/30/23  Visit # / Visits authorized: 14 / 20 (Not including Initial Evaluation)  FOTO Visit #:  2/3    PTA Visit #: 2/5     Time In: 10:00 AM  Time Out: 11:00 AM  Total Billable Time: 53 minutes    Sx:S/P arthroscopy of her left shoulder with labrum debridement rotator cuff repair, biceps tenodesis, subacromial bursectomy, and distal clavicle resection. Date of surgery 03/13/2023.     15 weeks post op  Subjective     Pt reports: No new c/o's.  She was not compliant with home exercise program.  Response to previous treatment: Felt good after the last session.  Functional change: None reported.     Pain: 3-4/10; Worst 7/10  Location: left Shoulder and Biceps      Objective      Objective Measures updated at progress report unless specified.   Shoulder Range of Motion:    Left active Left Passive   Flexion 160 170   Abduction 160 170   Extension WNL WNL   Ext. Rotation 80 80   Int. Rotation 60 65 deg   Elbow Flexion WNL --   Elbow Extension WNL              Upper Extremity Strength    Left   Shoulder flexion: 3+/5   Shoulder Abduction: 4/5   Shoulder ER 4-/5   Shoulder IR 4/5   Wrist flexion 5/5   Wrist ext 5/5   Elbow flexion 4/5   Elbow Ext 4/5            Joint mobility: restricted at ER     Palpation:moderate tenderness to palpation at left Anterior shoulder, UT and biceps      Sensation: No numbness and tingling in the hand     Flexibility:                Upper Trap = L moderate restriction              Scalenes:  L minimal restriction              SCM:  L mod restriction-- increased              Levator Scap: L mod restriction-increased    Treatment     Nely received the treatments listed below:      Therapeutic Exercises: to develop ROM, flexibility, and posture for 25 minutes including:  AROM-Pulleys x 8 min in scaption   UBE x 8 mins Level 1  Left UT stretch 3 x 30 sec  Left Levator 3 x 30 sec   Seated Cervical Flexion x 15  Seated Cervical Rotation x 15   Seated Shld shrugs x 20  Seated Scapular retractions  2 x 15   Chin tucks x 20   Supine passive bicep stretch performed by PT 5 x 15sec  Seated  L Elbow Flexion AROM neutral 20x, pronated 20x, supinated 20x  Supine L Tricep Press, Yellow band, 15x   Supine L forearm pronation/supination 0# 20x  Supine AAROM Flexion with dowel to 90 deg  2 x 15  Supine L shoulder AROM flexion x 20  Supine AAROM ER to approx 50 deg d with dowel  (shld 0 deg abd) 2 x 15  Supine L shoulder Int/Ext Rotation AROM 15x  Supine Serratus Punch with 2lb Dowel x 30  Supine L shoulder isometric extension into towel roll, 15x- 5 sec each  Supine L shoulder isometric Int / Ext rotation, 15x each way  Supine L isometric elbow flexion / extension, 15x each way  Seated  uppertrap (L) stretch performed by PT 3 x 30sec  Sidelying Passive L scapular ranging in all directions performed by PT x 4mins  Seated scapular retraction 30x  Sidelying L scapular depression with manual resistance  20x  Sidelying L shoulder ext rotation 15x 2  Wall Slides : Flexion x 20 ; Abduction (pain free range) x 20   Prone Rows 3 x 10 (Modified standing) 1lb  Prone Extension 3 x 10 (Modified standing) 1 lb  Prone Shoulder ABD 3 x 10 (Modified standing)  Standing shld ext with 1# dowel x 15  Standing rows  w/ BTBx 15  Standing shld Ext with RTB--INITIAL/ Add as tolerated  Standing shld ER  RTB--INITIAL/Add as tolerated  Standing :Ball on wall x 30 CW/CCW  Standing IR YTB X 15; RTB x 10  Supine PNF D1 (Seat belt) x 15  SupinePNF D2 (Sword) x 15  Updated HEP provided and reviewed    Consider: adding IR stretch with towel, RC isometrics    Manual x 28 mins  Minimal to moderate force manual resistance for rhythmic stabilization 90 flex/ex 2 x 30 sec; ABD/ADD 2 x 30 sec  PROM performed to the left Shoulder  - Flexion up to 175 deg  - Abd up to 170 deg  - ER up to 80 deg  -IR up to 65 deg  STM/DTM at L mid/posterior deltoid, proximal triceps, UT and levator  Strain Counter strain at the triceps and teres musculature  TrP Release at long head of tricep   Myofascial Release   Sub occipitals   Left Upper traps              Left Levator               scalenes   Left pec minor   Superficial left arm (Distal deltoid)              Left Rhomboids                KT tape to shorten left bicep tendon (discussed precautions)    Supervised Modalities after being cleared for contradictions: IFC Electrical Stimulation:  Nely received IFC Electrical Stimulation for pain control applied to the Left shoulder. Pt received stimulation for 0 minutes.     Therapeutic ultrasound   Modalities 8 mins  Ultrasound  left deltoid 1 MHZ- .8 W/CM2 Left  deltoid/tendon  Nely tolderated treatment well without any adverse effects.       cold pack for left shld for 0 minutes with Estim    Patient Education and Home Exercises       Education provided: Therapeutic exercises.    Written Home Exercises Provided: Patient instructed to cont prior HEP. Exercises were reviewed and Nely was able to demonstrate them prior to the end of the session.  Nely demonstrated good  understanding of the education provided. See EMR under Patient Instructions for exercises provided during therapy sessions    Assessment    Today's session continued with focus on ROM and interventions to reduce tissue tightness and alleviate pain at the elbow and  "shoulder. Pt tolerated above exercises . Shoulder ROM remains slightly limited with overhead motions however increased flexion, IR, ER and abd noted.Further improvements in general strength and functional status. Tightness of tissue remains throughout posterior shoulder girdle ant shoulder/pec, UT, levator, Rhomboids etc. No exacerbations reported. Pt recently presented with a set back over the last twos weeks after repetitively picking up and holding her grand son. Pain reduced and pt continues to progress slowly. Pt will continue to benefit from skilled PT services to address deficits indicated and listed on evaluation.    Nely Is progressing well towards her goals.   Pt prognosis is Good.     Pt will continue to benefit from skilled outpatient physical therapy to address the deficits listed in the problem list box on initial evaluation, provide pt/family education and to maximize pt's level of independence in the home and community environment.   Pt's spiritual, cultural and educational needs considered and pt agreeable to plan of care and goals.     Anticipated barriers to physical therapy: None    Goals: Short Term Goals: 3 weeks   Report decreased left shld pain  < / =  4/10 "At Worst" to increase tolerance for ADLs>Progressing   2. Pt will present with minimal restrictions left cervical musculature improving flexibility and decreasing tension reducing cervical discomfort.> PROGRESSING  4. Pt to tolerate HEP to improve ROM and independence with ADL's>MET     Long Term Goals: 6 weeks   Report decreased left shld pain  < / =  2/10 "At Worst" to increase tolerance for ADLs >PROGRESSING  2.improved limitation score to 37 % or less> PROGRESSING 39%  3. Independent with HEP for continued improvement in ROM/Strength.>PROGRESSING   4. Strength: Improve  left UE strength by 1 grade> PROGRESSING  5. Pt will demonstrate AROM left shld WNL> PROGRESSING    Plan     Extended Plan of care Certification: 5/1/2023 to " 7/30/23.     Outpatient Physical Therapy 2 times weekly for 5 weeks to include the following interventions: Electrical Stimulation IFC, Manual Therapy, Moist Heat/ Ice, Neuromuscular Re-ed, Therapeutic Activities, and Therapeutic Exercise. Other modalities as appropriate.    Jonathan Favre, PTA

## 2023-07-11 NOTE — NURSING
PT tolerated Injectafer infusion well. No adverse reaction noted. Pt education reinforced on Injectafer side effects, what to expect and when to call Mandy Ortega NP. Pt verbalized understanding. PIV d/c per protocol, pt tolerated well.  amr

## 2023-07-26 ENCOUNTER — TELEPHONE (OUTPATIENT)
Dept: ORTHOPEDICS | Facility: CLINIC | Age: 53
End: 2023-07-26
Payer: MEDICAID

## 2023-07-26 NOTE — TELEPHONE ENCOUNTER
Returned call. The patient stated she needed to reschedule. She stated her akuahband was coming in on 9/6/23 and it would be easier for both of them to have an appointment on the same day. I stated understanding and she was rescheduled to 9/6/23 with Dr. Katz.    ----- Message from Ion Sanchez sent at 7/26/2023 12:22 PM CDT -----  Regarding: call pt prior to appt today, call   Contact: pt   call pt prior to appt today, call pt

## 2023-08-02 ENCOUNTER — CLINICAL SUPPORT (OUTPATIENT)
Dept: REHABILITATION | Facility: HOSPITAL | Age: 53
End: 2023-08-02
Payer: MEDICAID

## 2023-08-02 DIAGNOSIS — M25.612 DECREASED ROM OF LEFT SHOULDER: ICD-10-CM

## 2023-08-02 DIAGNOSIS — M25.512 CHRONIC LEFT SHOULDER PAIN: Primary | ICD-10-CM

## 2023-08-02 DIAGNOSIS — M25.522 ELBOW PAIN, LEFT: ICD-10-CM

## 2023-08-02 DIAGNOSIS — G89.29 CHRONIC LEFT SHOULDER PAIN: Primary | ICD-10-CM

## 2023-08-02 DIAGNOSIS — M54.2 MUSCLE PAIN, CERVICAL: ICD-10-CM

## 2023-08-02 DIAGNOSIS — R29.898 IMPAIRED STRENGTH OF SHOULDER MUSCLES: ICD-10-CM

## 2023-08-02 PROCEDURE — 97110 THERAPEUTIC EXERCISES: CPT | Mod: PN

## 2023-08-02 PROCEDURE — 97140 MANUAL THERAPY 1/> REGIONS: CPT | Mod: PN

## 2023-08-02 NOTE — PROGRESS NOTES
[facility]   Physical Therapy Treatment Note /Update POC     Name: Nely Salas  Clinic Number: [id number]    Therapy Diagnosis:   Encounter Diagnoses   Name Primary?    Chronic left shoulder pain Yes    Muscle pain, cervical     Elbow pain, left     Decreased ROM of left shoulder     Impaired strength of shoulder muscles        Physician: Wiliam Katz DO    Visit Date: 8/2/2023    Physician Orders: PT Eval and Treat   Medical Diagnosis from Referral: Z98.890 (ICD-10-CM) - S/P arthroscopy of left shoulder  Evaluation Date: 5/1/2023  Authorization Period Expiration: 5/1/24  Plan of Care Expiration: 10/30/23  Visit # / Visits authorized: 15 / 20 (Not including Initial Evaluation)  FOTO Visit #:  2/3    PTA Visit #: 2/5     Time In: 8:05 AM  Time Out: 11:00 AM  Total Billable Time: 53 minutes    Sx:S/P arthroscopy of her left shoulder with labrum debridement rotator cuff repair, biceps tenodesis, subacromial bursectomy, and distal clavicle resection. Date of surgery 03/13/2023.     21.5 weeks post op  Subjective     Pt reports: No new c/o's. Pt states most of her discomfort is in the elbow. Thr left shoulder has its moments but overall is fine. Pt has not followed up with her orthopedic stating that she missed her appoint apporx 1 week ago.   She was not compliant with home exercise program.  Response to previous treatment: Felt good after the last session.  Functional change: None reported.     Pain: 2/10; Worst 4/10  Location: left Shoulder and Biceps      Objective      Objective Measures updated at progress report unless specified.   Shoulder Range of Motion:    Left active Left Passive   Flexion 170 170   Abduction 170 170   Extension WNL WNL   Ext. Rotation 80 80   Int. Rotation 60 - even with right UE-pt states that she has never been able to reach behind her back fully 90   Elbow Flexion WNL --   Elbow Extension WNL              Upper Extremity Strength    Left   Shoulder  flexion: 4-/5   Shoulder Abduction: 4/5   Shoulder ER 4-/5   Shoulder IR 4/5   Wrist flexion 5/5   Wrist ext 5/5   Elbow flexion 4/5   Elbow Ext 4/5            Joint mobility: restricted at ER     Palpation:minimal tenderness to palpation at left Anterior shoulder, UT and biceps     Sensation: No numbness and tingling in the hand     Flexibility:                Upper Trap = L moderate restriction              Scalenes:  L minimal restriction              SCM:  L mod restriction-- increased              Levator Scap: L mod restriction-increased    Treatment     Nely received the treatments listed below:      Therapeutic Exercises: to develop ROM, flexibility, and posture for 43 minutes including:  AROM-Pulleys x 8 min (4 mins scaption/4 mins ABD)  UBE x 8 mins Level 2  Left UT stretch 3 x 30 sec  Left Levator 3 x 30 sec   Seated Cervical Flexion x 15  Seated Cervical Rotation x 15   Seated Shld shrugs x 20  Seated Scapular retractions  2 x 15   Chin tucks x 20   Supine passive bicep stretch performed by PT 5 x 15sec  Seated  L Elbow Flexion AROM neutral 20x, pronated 20x, supinated 20x  Supine L Tricep Press, Yellow band, 15x   Supine L forearm pronation/supination 0# 20x  Supine AAROM Flexion with dowel to 90 deg  2 x 15  Supine L shoulder AROM flexion x 20  Supine AAROM ER to approx 50 deg d with dowel  (shld 0 deg abd) 2 x 15  Supine L shoulder Int/Ext Rotation AROM 15x  Supine Serratus Punch with 2lb Dowel x 30  Supine L shoulder isometric extension into towel roll, 15x- 5 sec each  Supine L shoulder isometric Int / Ext rotation, 15x each way  Supine L isometric elbow flexion / extension, 15x each way  Seated  uppertrap (L) stretch performed by PT 3 x 30sec  Sidelying Passive L scapular ranging in all directions performed by PT x 4mins  Seated scapular retraction 30x  Sidelying L scapular depression with manual resistance  20x  Sidelying L shoulder ext rotation 15x 2  Wall Slides : Flexion x 20 ; Abduction  (pain free range) x 20   Prone Rows 3 x 10 (Modified standing) 1lb  Prone Extension 3 x 10 (Modified standing) 1 lb  Prone Shoulder ABD 3 x 10 (Modified standing)  Standing shld ext with 1# dowel x 15  Standing rows  w/ BTBx 20  Wall push ups x 15  Standing shld Ext with RTB x 15  Standing shld ER RTB x 15  Standing AROM all directions   x 15 ea  Standing Horizontal add stretch 1 x 30 sec  Standing :Ball on wall x 30 CW/CCW  Standing IR YTB X 15; RTB x 10  Standing PNF D1 (Seat belt) x 15  Standig PNF D2 (Sword) x 15  Updated HEP provided and reviewed    Consider: adding IR stretch with towel, RC isometrics    Manual x 10MINS  Minimal to moderate force manual resistance for rhythmic stabilization 90 flex/ex 2 x 30 sec; ABD/ADD 2 x 30 sec  PROM performed to the left Shoulder TO ER  STM/DTM at L mid/posterior deltoid, proximal triceps, UT and levator  Strain Counter strain at the triceps and teres musculature  TrP Release at long head of tricep   Myofascial Release   Sub occipitals   Left Upper traps              Left Levator               scalenes   Left pec minor   Superficial left arm (Distal deltoid)              Left Rhomboids                KT tape to shorten left bicep tendon (discussed precautions)    Supervised Modalities after being cleared for contradictions: IFC Electrical Stimulation:  Nely received IFC Electrical Stimulation for pain control applied to the Left shoulder. Pt received stimulation for 0 minutes.     Therapeutic ultrasound   Modalities 8 mins  Ultrasound  left deltoid 1 MHZ- .8 W/CM2 Left  deltoid/tendon  Nely tolderated treatment well without any adverse effects.       cold pack for left shld for 0 minutes with Estim    Patient Education and Home Exercises       Education provided: Therapeutic exercises.    Written Home Exercises Provided: Patient instructed to cont prior HEP. Exercises were reviewed and Nely was able to demonstrate them prior to the end of the session.  Nely  "demonstrated good  understanding of the education provided. See EMR under Patient Instructions for exercises provided during therapy sessions    Assessment    Pt has not been seen in this clinic for approx. 3 weeks. Attendance has lacked consistency with concern for compliance. Today's session continued with focus on ROM and interventions to reduce tissue tightness and alleviate pain at the elbow and shoulder. Pt tolerated above exercises . Shoulder ROM remains slightly limited with overhead motions however increased flexion, IR, ER and abd noted.Slight  improvements in general strength and functional status. Tightness of tissue remains throughout posterior shoulder girdle ant shoulder/pec, UT, levator, Rhomboids etc. Strengthening was progressed today. No exacerbations reported however fatigue noted.Pain reduced and pt continues to progress slowly. Pt will continue to benefit from skilled PT services to address deficits indicated and listed on evaluation. Pt instructed on the importance of attendance and performing HEP.    Nely Is progressing well towards her goals.   Pt prognosis is Good.     Pt will continue to benefit from skilled outpatient physical therapy to address the deficits listed in the problem list box on initial evaluation, provide pt/family education and to maximize pt's level of independence in the home and community environment.   Pt's spiritual, cultural and educational needs considered and pt agreeable to plan of care and goals.     Anticipated barriers to physical therapy: None    Goals: Short Term Goals: 3 weeks   Report decreased left shld pain  < / =  4/10 "At Worst" to increase tolerance for ADLs>MET  2. Pt will present with minimal restrictions left cervical musculature improving flexibility and decreasing tension reducing cervical discomfort.> MET  4. Pt to tolerate HEP to improve ROM and independence with ADL's>MET     Long Term Goals: 6 weeks   Report decreased left shld pain  < / =  " "2/10 "At Worst" to increase tolerance for ADLs >PROGRESSING  2.improved limitation score to 37 % or less> PROGRESSING 39%  3. Independent with HEP for continued improvement in ROM/Strength.>PROGRESSING   4. Strength: Improve  left UE strength by 1 grade> PROGRESSING  5. Pt will demonstrate AROM left shld WNL> PROGRESSING    Plan     Extended Plan of care Certification: 8/2/2023 to 10/31/23.     Outpatient Physical Therapy 2 times weekly for 5 weeks to include the following interventions: Electrical Stimulation IFC, Manual Therapy, Moist Heat/ Ice, Neuromuscular Re-ed, Therapeutic Activities, and Therapeutic Exercise. Other modalities as appropriate.    Mamta Escoto, PT                          "

## 2023-08-04 ENCOUNTER — CLINICAL SUPPORT (OUTPATIENT)
Dept: REHABILITATION | Facility: HOSPITAL | Age: 53
End: 2023-08-04
Payer: MEDICAID

## 2023-08-04 DIAGNOSIS — M25.612 DECREASED ROM OF LEFT SHOULDER: ICD-10-CM

## 2023-08-04 DIAGNOSIS — M25.512 CHRONIC LEFT SHOULDER PAIN: Primary | ICD-10-CM

## 2023-08-04 DIAGNOSIS — M54.2 MUSCLE PAIN, CERVICAL: ICD-10-CM

## 2023-08-04 DIAGNOSIS — G89.29 CHRONIC LEFT SHOULDER PAIN: Primary | ICD-10-CM

## 2023-08-04 DIAGNOSIS — M25.522 ELBOW PAIN, LEFT: ICD-10-CM

## 2023-08-04 DIAGNOSIS — R29.898 IMPAIRED STRENGTH OF SHOULDER MUSCLES: ICD-10-CM

## 2023-08-04 PROCEDURE — 97140 MANUAL THERAPY 1/> REGIONS: CPT | Mod: PN

## 2023-08-04 PROCEDURE — 97110 THERAPEUTIC EXERCISES: CPT | Mod: PN

## 2023-08-04 NOTE — PROGRESS NOTES
[facility]   Physical Therapy Treatment Note      Name: Nely Salas  Clinic Number: [id number]    Therapy Diagnosis:   Encounter Diagnoses   Name Primary?    Chronic left shoulder pain Yes    Muscle pain, cervical     Elbow pain, left     Decreased ROM of left shoulder     Impaired strength of shoulder muscles        Physician: Wiliam Katz DO    Visit Date: 8/4/2023    Physician Orders: PT Eval and Treat   Medical Diagnosis from Referral: Z98.890 (ICD-10-CM) - S/P arthroscopy of left shoulder  Evaluation Date: 5/1/2023  Authorization Period Expiration: 5/1/24  Plan of Care Expiration: 10/30/23  Visit # / Visits authorized: 16 / 20 (Not including Initial Evaluation)  FOTO Visit #:  2/3    PTA Visit #: 0/5     Time In: 9:00 AM  Time Out: 10:00 AM  Total Billable Time: 55 minutes    Sx:S/P arthroscopy of her left shoulder with labrum debridement rotator cuff repair, biceps tenodesis, subacromial bursectomy, and distal clavicle resection. Date of surgery 03/13/2023.     21.5 weeks post op  Subjective     Pt reports: No new c/o's. Elbow remains the most painful with discomfort across the chest.  She was not compliant with home exercise program.  Response to previous treatment: Felt good after the last session.  Functional change: None reported.     Pain: 3/10; Worst 4/10  Location: left Shoulder and Biceps      Objective      Objective Measures updated at progress report unless specified.     Treatment     Nely received the treatments listed below:      Therapeutic Exercises: to develop ROM, flexibility, and posture for 8 minutes including:  AROM-Pulleys x 8 min (4 mins scaption/4 mins ABD)  UBE x 8 mins Level 2  Left UT stretch 3 x 30 sec  Left Levator 3 x 30 sec   Seated Cervical Flexion x 15  Seated Cervical Rotation x 15   Seated Shld shrugs x 20  Seated Scapular retractions  2 x 15   Chin tucks x 20   Supine passive bicep stretch performed by PT 5 x 15sec  Seated  L Elbow  Flexion AROM neutral 20x, pronated 20x, supinated 20x  Supine L Tricep Press, Yellow band, 15x   Supine L forearm pronation/supination 0# 20x  Supine AAROM Flexion with dowel to 90 deg  2 x 15  Supine L shoulder AROM flexion x 20  Supine AAROM ER to approx 50 deg d with dowel  (shld 0 deg abd) 2 x 15  Supine L shoulder Int/Ext Rotation AROM 15x  Supine Serratus Punch with 2lb Dowel x 30  Supine L shoulder isometric extension into towel roll, 15x- 5 sec each  Supine L shoulder isometric Int / Ext rotation, 15x each way  Supine L isometric elbow flexion / extension, 15x each way  Seated  uppertrap (L) stretch performed by PT 3 x 30sec  Sidelying Passive L scapular ranging in all directions performed by PT x 4mins  Seated scapular retraction 30x  Sidelying L scapular depression with manual resistance  20x  Sidelying L shoulder ext rotation 15x 2  Wall Slides : Flexion x 20 ; Abduction (pain free range) x 20   Prone Rows 3 x 10 (Modified standing) 1lb  Prone Extension 3 x 10 (Modified standing) 1 lb  Prone Shoulder ABD 3 x 10 (Modified standing)  Standing shld ext with 1# dowel x 15  Standing rows  w/ BTBx 20  Wall push ups x 15  Standing shld Ext with RTB x 15  Standing shld ER RTB x 15  Standing AROM all directions   x 15 ea  Standing Horizontal add stretch 1 x 30 sec  Standing :Ball on wall x 30 CW/CCW  Standing IR YTB X 15; RTB x 10  Standing PNF D1 (Seat belt) x 15  Standig PNF D2 (Sword) x 15  Updated HEP provided and reviewed    Consider: adding IR stretch with towel, RC isometrics    Manual x 47 MINS  Minimal to moderate force manual resistance for rhythmic stabilization 90 flex/ex 2 x 30 sec; ABD/ADD 2 x 30 sec  PROM performed to the left Shoulder all directions  STM/DTM at L mid/posterior deltoid, proximal triceps, UT and levator  Strain Counter strain at the triceps and teres musculature  TrP Release at long head of tricep , pec major   Myofascial Release   Sub occipitals   Left Upper traps              Left  Levator               scalenes   Left pec minor   Superficial left arm (Distal deltoid)              Left Rhomboids                KT tape to shorten left bicep tendon (discussed precautions)    Supervised Modalities after being cleared for contradictions: IFC Electrical Stimulation:  Nely received IFC Electrical Stimulation for pain control applied to the Left shoulder. Pt received stimulation for 0 minutes.     Therapeutic ultrasound   Modalities 8 mins  Ultrasound  left deltoid 1 MHZ- .8 W/CM2 Left  deltoid/tendon  Nely tolderated treatment well without any adverse effects.       cold pack for left shld for 0 minutes with Estim    Patient Education and Home Exercises       Education provided: Therapeutic exercises.    Written Home Exercises Provided: Patient instructed to cont prior HEP. Exercises were reviewed and Nely was able to demonstrate them prior to the end of the session.  Nely demonstrated good  understanding of the education provided. See EMR under Patient Instructions for exercises provided during therapy sessions    Assessment   Pt tolerated today's session well with focus on reducing capsular tightness and soft tissue/muscular discomfort. Pt reports reduced discomfort in the chest and triceps allowing for greater mobility. Passive internal and External ROM WNL. Shoulder pain at end range flexion and abd.Will continue to advance pt as tolerated.     Nely Is progressing well towards her goals.   Pt prognosis is Good.     Pt will continue to benefit from skilled outpatient physical therapy to address the deficits listed in the problem list box on initial evaluation, provide pt/family education and to maximize pt's level of independence in the home and community environment.   Pt's spiritual, cultural and educational needs considered and pt agreeable to plan of care and goals.     Anticipated barriers to physical therapy: None    Goals: Short Term Goals: 3 weeks   Report decreased left  "shld pain  < / =  4/10 "At Worst" to increase tolerance for ADLs>MET  2. Pt will present with minimal restrictions left cervical musculature improving flexibility and decreasing tension reducing cervical discomfort.> MET  4. Pt to tolerate HEP to improve ROM and independence with ADL's>MET     Long Term Goals: 6 weeks   Report decreased left shld pain  < / =  2/10 "At Worst" to increase tolerance for ADLs >PROGRESSING  2.improved limitation score to 37 % or less> PROGRESSING 39%  3. Independent with HEP for continued improvement in ROM/Strength.>PROGRESSING   4. Strength: Improve  left UE strength by 1 grade> PROGRESSING  5. Pt will demonstrate AROM left shld WNL> PROGRESSING    Plan     Extended Plan of care Certification: 8/2/2023 to 10/31/23.     Outpatient Physical Therapy 2 times weekly for 5 weeks to include the following interventions: Electrical Stimulation IFC, Manual Therapy, Moist Heat/ Ice, Neuromuscular Re-ed, Therapeutic Activities, and Therapeutic Exercise. Other modalities as appropriate.    Mamta Escoto, PT                          "

## 2023-08-11 ENCOUNTER — CLINICAL SUPPORT (OUTPATIENT)
Dept: REHABILITATION | Facility: HOSPITAL | Age: 53
End: 2023-08-11
Payer: MEDICAID

## 2023-08-11 DIAGNOSIS — M25.512 CHRONIC LEFT SHOULDER PAIN: Primary | ICD-10-CM

## 2023-08-11 DIAGNOSIS — M54.2 MUSCLE PAIN, CERVICAL: ICD-10-CM

## 2023-08-11 DIAGNOSIS — G89.29 CHRONIC LEFT SHOULDER PAIN: Primary | ICD-10-CM

## 2023-08-11 DIAGNOSIS — M25.522 ELBOW PAIN, LEFT: ICD-10-CM

## 2023-08-11 DIAGNOSIS — M25.612 DECREASED ROM OF LEFT SHOULDER: ICD-10-CM

## 2023-08-11 DIAGNOSIS — R29.898 IMPAIRED STRENGTH OF SHOULDER MUSCLES: ICD-10-CM

## 2023-08-11 PROCEDURE — 97140 MANUAL THERAPY 1/> REGIONS: CPT | Mod: PN,CQ

## 2023-08-11 NOTE — PROGRESS NOTES
[facility]   Physical Therapy Treatment Note      Name: Nely Salas  Clinic Number: [id number]    Therapy Diagnosis:   Encounter Diagnoses   Name Primary?    Chronic left shoulder pain Yes    Muscle pain, cervical     Elbow pain, left     Decreased ROM of left shoulder     Impaired strength of shoulder muscles        Physician: Mandy Ortega NP    Visit Date: 8/11/2023    Physician Orders: PT Eval and Treat   Medical Diagnosis from Referral: Z98.890 (ICD-10-CM) - S/P arthroscopy of left shoulder  Evaluation Date: 5/1/2023  Authorization Period Expiration: 5/1/24  Plan of Care Expiration: 10/30/23  Visit # / Visits authorized: 17 / 20 (Not including Initial Evaluation)  FOTO Visit #:  2/3    PTA Visit #: 1/5     Time In: 8:00 AM  Time Out: 8:53 AM  Total Billable Time: 53 minutes    Sx:S/P arthroscopy of her left shoulder with labrum debridement rotator cuff repair, biceps tenodesis, subacromial bursectomy, and distal clavicle resection. Date of surgery 03/13/2023.     21.5 weeks post op  Subjective     Pt reports: No new c/o's. Elbow remains the most painful with discomfort across the chest.  She did feel better after last therapy for a couple of days.  She was not compliant with home exercise program.  Response to previous treatment: Felt good after the last session.  Functional change: None reported.     Pain: 6/10; Worst 6/10  Location: left Shoulder and Biceps      Objective      Objective Measures updated at progress report unless specified.     Treatment     Nely received the treatments listed below:      Therapeutic Exercises: to develop ROM, flexibility, and posture for 0 minutes including:  AROM-Pulleys x 8 min (4 mins scaption/4 mins ABD)  UBE x 8 mins Level 2  Left UT stretch 3 x 30 sec  Left Levator 3 x 30 sec   Seated Cervical Flexion x 15  Seated Cervical Rotation x 15   Seated Shld shrugs x 20  Seated Scapular retractions  2 x 15   Chin tucks x 20   Supine passive  bicep stretch performed by PT 5 x 15sec  Seated  L Elbow Flexion AROM neutral 20x, pronated 20x, supinated 20x  Supine L Tricep Press, Yellow band, 15x   Supine L forearm pronation/supination 0# 20x  Supine AAROM Flexion with dowel to 90 deg  2 x 15  Supine L shoulder AROM flexion x 20  Supine AAROM ER to approx 50 deg d with dowel  (shld 0 deg abd) 2 x 15  Supine L shoulder Int/Ext Rotation AROM 15x  Supine Serratus Punch with 2lb Dowel x 30  Supine L shoulder isometric extension into towel roll, 15x- 5 sec each  Supine L shoulder isometric Int / Ext rotation, 15x each way  Supine L isometric elbow flexion / extension, 15x each way  Seated  uppertrap (L) stretch performed by PT 3 x 30sec  Sidelying Passive L scapular ranging in all directions performed by PT x 4mins  Seated scapular retraction 30x  Sidelying L scapular depression with manual resistance  20x  Sidelying L shoulder ext rotation 15x 2  Wall Slides : Flexion x 20 ; Abduction (pain free range) x 20   Prone Rows 3 x 10 (Modified standing) 1lb  Prone Extension 3 x 10 (Modified standing) 1 lb  Prone Shoulder ABD 3 x 10 (Modified standing)  Standing shld ext with 1# dowel x 15  Standing rows  w/ BTBx 20  Wall push ups x 15  Standing shld Ext with RTB x 15  Standing shld ER RTB x 15  Standing AROM all directions   x 15 ea  Standing Horizontal add stretch 1 x 30 sec  Standing :Ball on wall x 30 CW/CCW  Standing IR YTB X 15; RTB x 10  Standing PNF D1 (Seat belt) x 15  Standig PNF D2 (Sword) x 15  Updated HEP provided and reviewed    Consider: adding IR stretch with towel, RC isometrics    Manual x 53 MINS  Minimal to moderate force manual resistance for rhythmic stabilization 90 flex/ex 2 x 30 sec; ABD/ADD 2 x 30 sec  PROM performed to the left Shoulder all directions  STM/DTM at L mid/posterior deltoid, proximal triceps, UT and levator  Strain Counter strain at the triceps and teres musculature  TrP Release at long head of tricep , pec major   Myofascial  Release   Sub occipitals   Left Upper traps              Left Levator               scalenes   Left pec minor   Superficial left arm (Distal deltoid)              Left Rhomboids                KT tape to shorten left bicep tendon (discussed precautions)    Supervised Modalities after being cleared for contradictions: IFC Electrical Stimulation:  Nely received IFC Electrical Stimulation for pain control applied to the Left shoulder. Pt received stimulation for 0 minutes.     Therapeutic ultrasound   Modalities 0 mins  Ultrasound  left deltoid 1 MHZ- .8 W/CM2 Left  deltoid/tendon  Nely tolderated treatment well without any adverse effects.       cold pack for left shld for 0 minutes with Estim    Patient Education and Home Exercises       Education provided: Therapeutic exercises.    Written Home Exercises Provided: Patient instructed to cont prior HEP. Exercises were reviewed and Nely was able to demonstrate them prior to the end of the session.  Nely demonstrated good  understanding of the education provided. See EMR under Patient Instructions for exercises provided during therapy sessions    Assessment   Pt  was able to get a pec minor release without too much pain.  She noted better range of motion and less pain after treatment.  I am concerned that she might have an ulnar nerve issue at her elbow and advised her to show her doctor on her next visit.    Nely Is progressing well towards her goals.   Pt prognosis is Good.     Pt will continue to benefit from skilled outpatient physical therapy to address the deficits listed in the problem list box on initial evaluation, provide pt/family education and to maximize pt's level of independence in the home and community environment.   Pt's spiritual, cultural and educational needs considered and pt agreeable to plan of care and goals.     Anticipated barriers to physical therapy: None    Goals: Short Term Goals: 3 weeks   Report decreased left shld pain  <  "/ =  4/10 "At Worst" to increase tolerance for ADLs>MET  2. Pt will present with minimal restrictions left cervical musculature improving flexibility and decreasing tension reducing cervical discomfort.> MET  4. Pt to tolerate HEP to improve ROM and independence with ADL's>MET     Long Term Goals: 6 weeks   Report decreased left shld pain  < / =  2/10 "At Worst" to increase tolerance for ADLs >PROGRESSING  2.improved limitation score to 37 % or less> PROGRESSING 39%  3. Independent with HEP for continued improvement in ROM/Strength.>PROGRESSING   4. Strength: Improve  left UE strength by 1 grade> PROGRESSING  5. Pt will demonstrate AROM left shld WNL> PROGRESSING    Plan     Extended Plan of care Certification: 8/2/2023 to 10/31/23.     Outpatient Physical Therapy 2 times weekly for 5 weeks to include the following interventions: Electrical Stimulation IFC, Manual Therapy, Moist Heat/ Ice, Neuromuscular Re-ed, Therapeutic Activities, and Therapeutic Exercise. Other modalities as appropriate.    Cuate Landers, RAGHAV                          "

## 2023-08-21 ENCOUNTER — CLINICAL SUPPORT (OUTPATIENT)
Dept: REHABILITATION | Facility: HOSPITAL | Age: 53
End: 2023-08-21
Payer: MEDICAID

## 2023-08-21 DIAGNOSIS — R29.898 IMPAIRED STRENGTH OF SHOULDER MUSCLES: ICD-10-CM

## 2023-08-21 DIAGNOSIS — M25.612 DECREASED ROM OF LEFT SHOULDER: ICD-10-CM

## 2023-08-21 DIAGNOSIS — G89.29 CHRONIC LEFT SHOULDER PAIN: Primary | ICD-10-CM

## 2023-08-21 DIAGNOSIS — M25.512 CHRONIC LEFT SHOULDER PAIN: Primary | ICD-10-CM

## 2023-08-21 DIAGNOSIS — M54.2 MUSCLE PAIN, CERVICAL: ICD-10-CM

## 2023-08-21 DIAGNOSIS — M25.522 ELBOW PAIN, LEFT: ICD-10-CM

## 2023-08-21 PROCEDURE — 97014 ELECTRIC STIMULATION THERAPY: CPT | Mod: PN,CQ

## 2023-08-21 PROCEDURE — 97035 APP MDLTY 1+ULTRASOUND EA 15: CPT | Mod: PN,CQ

## 2023-08-21 PROCEDURE — 97110 THERAPEUTIC EXERCISES: CPT | Mod: PN,CQ

## 2023-08-21 NOTE — PROGRESS NOTES
[facility]   Physical Therapy Treatment Note      Name: Nely Salas  Clinic Number: [id number]    Therapy Diagnosis:   Encounter Diagnoses   Name Primary?    Chronic left shoulder pain Yes    Muscle pain, cervical     Elbow pain, left     Decreased ROM of left shoulder     Impaired strength of shoulder muscles        Physician: Wiliam Katz DO    Visit Date: 8/21/2023    Physician Orders: PT Eval and Treat   Medical Diagnosis from Referral: Z98.890 (ICD-10-CM) - S/P arthroscopy of left shoulder  Evaluation Date: 5/1/2023  Authorization Period Expiration: 5/1/24  Plan of Care Expiration: 10/30/23  Visit # / Visits authorized: 18 / 20 (Not including Initial Evaluation)  FOTO Visit #:  2/3    PTA Visit #: 2/5     Time In: 2:25 pm  Time Out: 3:20 pm  Total Billable Time: 40 minutes    Sx:S/P arthroscopy of her left shoulder with labrum debridement rotator cuff repair, biceps tenodesis, subacromial bursectomy, and distal clavicle resection. Date of surgery 03/13/2023.     21.5 weeks post op  Subjective     Pt reports: No new c/o's.  Left shoulder is feeling better.  She was not compliant with home exercise program.  Response to previous treatment: Felt good after the last session.  Functional change: None reported.     Pain: 2/10; Worst 6/10  Location: left Shoulder and Biceps      Objective      Objective Measures updated at progress report unless specified.     Treatment     Nely received the treatments listed below:      Therapeutic Exercises: to develop ROM, flexibility, and posture for 10 minutes including:  Issued red t putty with instructions to use it 3 min twice a day   Neuro flossing stretches 3-5 sec x 3    DNP  AROM-Pulleys x 8 min (4 mins scaption/4 mins ABD)    UBE x 8 mins Level 2  Left UT stretch 3 x 30 sec  Left Levator 3 x 30 sec   Seated Cervical Flexion x 15  Seated Cervical Rotation x 15   Seated Shld shrugs x 20  Seated Scapular retractions  2 x 15   Chin  tucks x 20   Supine passive bicep stretch performed by PT 5 x 15sec  Seated  L Elbow Flexion AROM neutral 20x, pronated 20x, supinated 20x  Supine L Tricep Press, Yellow band, 15x   Supine L forearm pronation/supination 0# 20x  Supine AAROM Flexion with dowel to 90 deg  2 x 15  Supine L shoulder AROM flexion x 20  Supine AAROM ER to approx 50 deg d with dowel  (shld 0 deg abd) 2 x 15  Supine L shoulder Int/Ext Rotation AROM 15x  Supine Serratus Punch with 2lb Dowel x 30  Supine L shoulder isometric extension into towel roll, 15x- 5 sec each  Supine L shoulder isometric Int / Ext rotation, 15x each way  Supine L isometric elbow flexion / extension, 15x each way  Seated  uppertrap (L) stretch performed by PT 3 x 30sec  Sidelying Passive L scapular ranging in all directions performed by PT x 4mins  Seated scapular retraction 30x  Sidelying L scapular depression with manual resistance  20x  Sidelying L shoulder ext rotation 15x 2  Wall Slides : Flexion x 20 ; Abduction (pain free range) x 20   Prone Rows 3 x 10 (Modified standing) 1lb  Prone Extension 3 x 10 (Modified standing) 1 lb  Prone Shoulder ABD 3 x 10 (Modified standing)  Standing shld ext with 1# dowel x 15  Standing rows  w/ BTBx 20  Wall push ups x 15  Standing shld Ext with RTB x 15  Standing shld ER RTB x 15  Standing AROM all directions   x 15 ea  Standing Horizontal add stretch 1 x 30 sec  Standing :Ball on wall x 30 CW/CCW  Standing IR YTB X 15; RTB x 10  Standing PNF D1 (Seat belt) x 15  Standig PNF D2 (Sword) x 15  Updated HEP provided and reviewed    Consider: adding IR stretch with towel, RC isometrics    Manual x 0 MINS  Minimal to moderate force manual resistance for rhythmic stabilization 90 flex/ex 2 x 30 sec; ABD/ADD 2 x 30 sec  PROM performed to the left Shoulder all directions  STM/DTM at L mid/posterior deltoid, proximal triceps, UT and levator  Strain Counter strain at the triceps and teres musculature  TrP Release at long head of tricep ,  "pec major   Myofascial Release   Sub occipitals   Left Upper traps              Left Levator               scalenes   Left pec minor   Superficial left arm (Distal deltoid)              Left Rhomboids                KT tape to shorten left bicep tendon (discussed precautions)    Supervised Modalities after being cleared for contradictions: IFC Electrical Stimulation:  Nely received IFC Electrical Stimulation for pain control applied to the Left shoulder. Pt received stimulation for 0 minutes.     Therapeutic ultrasound   Modalities 10 mins  Ultrasound  left deltoid 1 MHZ- 1.2 W/CM2 Left  deltoid/tendon  Nely tolderated treatment well without any adverse effects.     IFC to left shoulder x 20 min      cold pack for left shld for 0 minutes     Patient Education and Home Exercises       Education provided: Therapeutic exercises.    Written Home Exercises Provided: Patient instructed to cont prior HEP. Exercises were reviewed and Nely was able to demonstrate them prior to the end of the session.  Nely demonstrated good  understanding of the education provided. See EMR under Patient Instructions for exercises provided during therapy sessions    Assessment   Pt  pain has centralized: therefore, I attempted US and estim.  She felt good at the end of treatment.    Nely Is progressing well towards her goals.   Pt prognosis is Good.     Pt will continue to benefit from skilled outpatient physical therapy to address the deficits listed in the problem list box on initial evaluation, provide pt/family education and to maximize pt's level of independence in the home and community environment.   Pt's spiritual, cultural and educational needs considered and pt agreeable to plan of care and goals.     Anticipated barriers to physical therapy: None    Goals: Short Term Goals: 3 weeks   Report decreased left shld pain  < / =  4/10 "At Worst" to increase tolerance for ADLs>MET  2. Pt will present with minimal " "restrictions left cervical musculature improving flexibility and decreasing tension reducing cervical discomfort.> MET  4. Pt to tolerate HEP to improve ROM and independence with ADL's>MET     Long Term Goals: 6 weeks   Report decreased left shld pain  < / =  2/10 "At Worst" to increase tolerance for ADLs >PROGRESSING  2.improved limitation score to 37 % or less> PROGRESSING 39%  3. Independent with HEP for continued improvement in ROM/Strength.>PROGRESSING   4. Strength: Improve  left UE strength by 1 grade> PROGRESSING  5. Pt will demonstrate AROM left shld WNL> PROGRESSING    Plan     Extended Plan of care Certification: 8/2/2023 to 10/31/23.     Outpatient Physical Therapy 2 times weekly for 5 weeks to include the following interventions: Electrical Stimulation IFC, Manual Therapy, Moist Heat/ Ice, Neuromuscular Re-ed, Therapeutic Activities, and Therapeutic Exercise. Other modalities as appropriate.    Cuate Landers PTA                          "

## 2023-08-28 ENCOUNTER — CLINICAL SUPPORT (OUTPATIENT)
Dept: REHABILITATION | Facility: HOSPITAL | Age: 53
End: 2023-08-28
Payer: MEDICAID

## 2023-08-28 DIAGNOSIS — G89.29 CHRONIC LEFT SHOULDER PAIN: Primary | ICD-10-CM

## 2023-08-28 DIAGNOSIS — R29.898 IMPAIRED STRENGTH OF SHOULDER MUSCLES: ICD-10-CM

## 2023-08-28 DIAGNOSIS — M54.2 MUSCLE PAIN, CERVICAL: ICD-10-CM

## 2023-08-28 DIAGNOSIS — M25.522 ELBOW PAIN, LEFT: ICD-10-CM

## 2023-08-28 DIAGNOSIS — M25.612 DECREASED ROM OF LEFT SHOULDER: ICD-10-CM

## 2023-08-28 DIAGNOSIS — M25.512 CHRONIC LEFT SHOULDER PAIN: Primary | ICD-10-CM

## 2023-08-28 PROCEDURE — 97110 THERAPEUTIC EXERCISES: CPT | Mod: PN,CQ

## 2023-08-28 NOTE — PROGRESS NOTES
[facility]   Physical Therapy Treatment Note      Name: Nely Salas  Clinic Number: [id number]    Therapy Diagnosis:   Encounter Diagnoses   Name Primary?    Chronic left shoulder pain Yes    Muscle pain, cervical     Elbow pain, left     Decreased ROM of left shoulder     Impaired strength of shoulder muscles        Physician: Wiliam Katz DO    Visit Date: 8/28/2023    Physician Orders: PT Eval and Treat   Medical Diagnosis from Referral: Z98.890 (ICD-10-CM) - S/P arthroscopy of left shoulder  Evaluation Date: 5/1/2023  Authorization Period Expiration: 5/1/24  Plan of Care Expiration: 10/30/23  Visit # / Visits authorized: 19 / 20 (Not including Initial Evaluation)  FOTO Visit #:  2/3    PTA Visit #: 3/5     Time In: 9:00 AM  Time Out: 10:00 AM  Total Billable Time: 53 minutes    Sx:S/P arthroscopy of her left shoulder with labrum debridement rotator cuff repair, biceps tenodesis, subacromial bursectomy, and distal clavicle resection. Date of surgery 03/13/2023.     21.5 weeks post op  Subjective     Pt reports: No new c/o's.  She was not compliant with home exercise program.  Response to previous treatment: Felt good after the last session.  Functional change: None reported.     Pain: 2/10; Worst 6/10  Location: left Shoulder and Biceps      Objective      Objective Measures updated at progress report unless specified.     Treatment     Nely received the treatments listed below:      Therapeutic Exercises: to develop ROM, flexibility, and posture for 53 minutes including:  Issued red t putty with instructions to use it 3 min twice a day   Neuro flossing stretches 3-5 sec x 3    AROM-Pulleys x 8 min (4 mins scaption/4 mins ABD)  UBE x 10 mins Level 2  Left UT stretch 3 x 30 sec  Left Levator 3 x 30 sec   Seated Cervical Flexion x 15  Seated Cervical Rotation x 15   Seated Shld shrugs x 20  Seated Scapular retractions  2 x 15   Chin tucks x 20   Supine AAROM Flexion with  dowel to 90 deg  2 x 15  Supine L shoulder AROM flexion x 20  Supine AAROM ER to approx 50 deg d with dowel  (shld 0 deg abd) 2 x 15  Supine L shoulder Int/Ext Rotation AROM 15x  Supine Serratus Punch with 2lb Dowel x 30  Supine L shoulder isometric extension into towel roll, 15x- 5 sec each  Supine L shoulder isometric Int / Ext rotation, 15x each way  Supine L isometric elbow flexion / extension, 15x each way  Seated  uppertrap (L) stretch performed by PT 3 x 30sec  Sidelying Passive L scapular ranging in all directions performed by PT x 4mins  Seated scapular retraction 30x  Sidelying L scapular depression with manual resistance  20x  Sidelying L shoulder ext rotation 15x 2  Wall Slides : Flexion x 20 ; Abduction (pain free range) x 20   Prone Rows 3 x 10 (Modified standing) 1lb  Prone Extension 3 x 10 (Modified standing) 1 lb  Prone Shoulder ABD 3 x 10 (Modified standing)  Standing shld ext with 1# dowel x 15  Standing rows  w/ BTBx 20  Wall push ups x 15  Standing shld Ext with RTB x 15  Standing shld ER RTB x 15  Standing AROM all directions x 15 ea  Standing Horizontal add stretch 2 x 30 sec  Standing: Ball on wall x 30 CW/CCW  Standing IR YTB X 15; RTB x 10  Standing PNF D1 (Seat belt) x 15  Standig PNF D2 (Sword) x 15  Updated HEP provided and reviewed    Consider: adding IR stretch with towel, RC isometrics    Manual x 0 MINS  Minimal to moderate force manual resistance for rhythmic stabilization 90 flex/ex 2 x 30 sec; ABD/ADD 2 x 30 sec  PROM performed to the left Shoulder all directions  STM/DTM at L mid/posterior deltoid, proximal triceps, UT and levator  Strain Counter strain at the triceps and teres musculature  TrP Release at long head of tricep , pec major   Myofascial Release   Sub occipitals   Left Upper traps              Left Levator               scalenes   Left pec minor   Superficial left arm (Distal deltoid)              Left Rhomboids                KT tape to shorten left bicep tendon  "(discussed precautions)    Supervised Modalities after being cleared for contradictions: IFC Electrical Stimulation:  Nely received IFC Electrical Stimulation for pain control applied to the Left shoulder. Pt received stimulation for 0 minutes.     Therapeutic ultrasound   Modalities 10 mins  Ultrasound  left deltoid 1 MHZ- 1.2 W/CM2 Left  deltoid/tendon  Nely tolderated treatment well without any adverse effects.     IFC to left shoulder x 20 min      cold pack for left shld for 0 minutes     Patient Education and Home Exercises       Education provided: Therapeutic exercises.    Written Home Exercises Provided: Patient instructed to cont prior HEP. Exercises were reviewed and Nely was able to demonstrate them prior to the end of the session.  Nely demonstrated good  understanding of the education provided. See EMR under Patient Instructions for exercises provided during therapy sessions    Assessment   Patient did pretty well with exercises. Still presents with general UE weakness and needs to focus on strengthening of UE musculature.     Nely Is progressing well towards her goals.   Pt prognosis is Good.     Pt will continue to benefit from skilled outpatient physical therapy to address the deficits listed in the problem list box on initial evaluation, provide pt/family education and to maximize pt's level of independence in the home and community environment.   Pt's spiritual, cultural and educational needs considered and pt agreeable to plan of care and goals.     Anticipated barriers to physical therapy: None    Goals: Short Term Goals: 3 weeks   Report decreased left shld pain  < / =  4/10 "At Worst" to increase tolerance for ADLs>MET  2. Pt will present with minimal restrictions left cervical musculature improving flexibility and decreasing tension reducing cervical discomfort.> MET  4. Pt to tolerate HEP to improve ROM and independence with ADL's>MET     Long Term Goals: 6 weeks   Report " "decreased left shld pain  < / =  2/10 "At Worst" to increase tolerance for ADLs >PROGRESSING  2.improved limitation score to 37 % or less> PROGRESSING 39%  3. Independent with HEP for continued improvement in ROM/Strength.>PROGRESSING   4. Strength: Improve  left UE strength by 1 grade> PROGRESSING  5. Pt will demonstrate AROM left shld WNL> PROGRESSING    Plan     Extended Plan of care Certification: 8/2/2023 to 10/31/23.     Outpatient Physical Therapy 2 times weekly for 5 weeks to include the following interventions: Electrical Stimulation IFC, Manual Therapy, Moist Heat/ Ice, Neuromuscular Re-ed, Therapeutic Activities, and Therapeutic Exercise. Other modalities as appropriate.    Jonathan Favre, PTA                          "

## 2023-08-30 ENCOUNTER — CLINICAL SUPPORT (OUTPATIENT)
Dept: REHABILITATION | Facility: HOSPITAL | Age: 53
End: 2023-08-30
Payer: MEDICAID

## 2023-08-30 DIAGNOSIS — M25.612 DECREASED ROM OF LEFT SHOULDER: ICD-10-CM

## 2023-08-30 DIAGNOSIS — M25.522 ELBOW PAIN, LEFT: ICD-10-CM

## 2023-08-30 DIAGNOSIS — M25.512 CHRONIC LEFT SHOULDER PAIN: Primary | ICD-10-CM

## 2023-08-30 DIAGNOSIS — M54.2 MUSCLE PAIN, CERVICAL: ICD-10-CM

## 2023-08-30 DIAGNOSIS — R29.898 IMPAIRED STRENGTH OF SHOULDER MUSCLES: ICD-10-CM

## 2023-08-30 DIAGNOSIS — G89.29 CHRONIC LEFT SHOULDER PAIN: Primary | ICD-10-CM

## 2023-08-30 PROCEDURE — 97110 THERAPEUTIC EXERCISES: CPT | Mod: PN

## 2023-08-30 NOTE — PROGRESS NOTES
[facility]   Physical Therapy Treatment Note /Discharge NOTE     Name: Nely Salas  Clinic Number: [id number]    Therapy Diagnosis:   Encounter Diagnoses   Name Primary?    Chronic left shoulder pain Yes    Muscle pain, cervical     Elbow pain, left     Decreased ROM of left shoulder     Impaired strength of shoulder muscles          Physician: Wiliam Katz DO    Visit Date: 8/30/2023    Physician Orders: PT Eval and Treat   Medical Diagnosis from Referral: Z98.890 (ICD-10-CM) - S/P arthroscopy of left shoulder  Evaluation Date: 5/1/2023  Authorization Period Expiration: 5/1/24  Plan of Care Expiration: 10/30/23  Visit # / Visits authorized: 20 / 20 (Not including Initial Evaluation)  FOTO Visit #:  2/3    PTA Visit #: 3/5     Time In: 10:00 AM  Time Out: 11:55 AM  Total Billable Time: 45 minutes    Sx:S/P arthroscopy of her left shoulder with labrum debridement rotator cuff repair, biceps tenodesis, subacromial bursectomy, and distal clavicle resection. Date of surgery 03/13/2023.   Subjective     Pt reports: No new c/o's.  She was not compliant with home exercise program.  Response to previous treatment: Felt good after the last session.  Functional change: None reported.     Pain: 2/10; Worst 3/10  Location: left Shoulder and Biceps      Objective      Objective Measures updated at progress report unless specified.   Shoulder Range of Motion:    Left active Left Passive   Flexion 175 180   Abduction 175 175   Extension WNL WNL   Ext. Rotation 84 90   Int. Rotation Mid back  - even with right UE-pt states that she has never been able to reach behind her back fully 90   Elbow Flexion WNL --   Elbow Extension WNL              Upper Extremity Strength    Left   Shoulder flexion: 4/5   Shoulder Abduction: 4/5   Shoulder ER 5/5   Shoulder IR 5/5   Wrist flexion 5/5   Wrist ext 5/5   Elbow flexion 5/5   Elbow Ext 5/5            Joint mobility: restricted at ER     Palpation:minimal  tenderness to palpation at left Anterior shoulder, UT and biceps     Sensation: No numbness and tingling in the hand     Flexibility:                Upper Trap = L moderate restriction              Scalenes:  L minimal restriction              SCM:  L mod restriction-- increased              Levator Scap: L mod restriction-increased      Treatment     Nely received the treatments listed below:      Therapeutic Exercises: to develop ROM, flexibility, and posture for 45 minutes including:  Issued red t putty with instructions to use it 3 min twice a day   Neuro flossing stretches 3-5 sec x 3    AROM-Pulleys x 8 min (4 mins scaption/4 mins ABD)  UBE x 10 mins Level 2  Left UT stretch 3 x 30 sec  Left Levator 3 x 30 sec   Seated Cervical Flexion x 15  Seated Cervical Rotation x 15   Seated Shld shrugs x 20  Seated Scapular retractions  2 x 15   Chin tucks x 20   Supine AAROM Flexion with dowel to 90 deg  2 x 15  Supine L shoulder AROM flexion x 20  Supine AAROM ER to approx 50 deg d with dowel  (shld 0 deg abd) 2 x 15  Supine L shoulder Int/Ext Rotation AROM 15x  Supine Serratus Punch with 2lb Dowel x 30  Supine L shoulder isometric extension into towel roll, 15x- 5 sec each  Supine L shoulder isometric Int / Ext rotation, 15x each way  Supine L isometric elbow flexion / extension, 15x each way  Seated  uppertrap (L) stretch performed by PT 3 x 30sec  Sidelying Passive L scapular ranging in all directions performed by PT x 4mins  Seated scapular retraction 30x  Sidelying L scapular depression with manual resistance  20x  Sidelying L shoulder ext rotation 15x 2  Wall Slides : Flexion x 20 ; Abduction (pain free range) x 20   Prone Rows 3 x 10 (Modified standing) 1lb  Prone Extension 3 x 10 (Modified standing) 1 lb  Prone Shoulder ABD 3 x 10 (Modified standing)  Standing shld ext with 1# dowel x 15  Standing rows  w/ BTBx 30   Wall push ups x 15  Standing shld Ext with RTB x 20  Standing shld ER RTB x 15  Standing AROM  all directions x 15 ea  Standing Horizontal add stretch 1 x 30 sec  Standing: Ball on wall x 30 CW/CCW  Standing IR X  RTB x 15  Standing PNF D1 (Seat belt) x 15  Standig PNF D2 (Sword) x 15  PRONE flys x 10  IR stretch with towel x 15  Updated HEP provided and reviewed    Consider:  RC isometrics    Manual x 0 MINS  Minimal to moderate force manual resistance for rhythmic stabilization 90 flex/ex 2 x 30 sec; ABD/ADD 2 x 30 sec  PROM performed to the left Shoulder all directions  STM/DTM at L mid/posterior deltoid, proximal triceps, UT and levator  Strain Counter strain at the triceps and teres musculature  TrP Release at long head of tricep , pec major   Myofascial Release   Sub occipitals   Left Upper traps              Left Levator               scalenes   Left pec minor   Superficial left arm (Distal deltoid)              Left Rhomboids                KT tape to shorten left bicep tendon (discussed precautions)    Supervised Modalities after being cleared for contradictions: IFC Electrical Stimulation:  Nely received IFC Electrical Stimulation for pain control applied to the Left shoulder. Pt received stimulation for 0 minutes.     Therapeutic ultrasound   Modalities 10 mins  Ultrasound  left deltoid 1 MHZ- 1.2 W/CM2 Left  deltoid/tendon  Nely tolderated treatment well without any adverse effects.     IFC to left shoulder x 20 min      cold pack for left shld for 0 minutes     Patient Education and Home Exercises       Education provided: Therapeutic exercises.    Written Home Exercises Provided: Patient instructed to cont prior HEP. Exercises were reviewed and Nely was able to demonstrate them prior to the end of the session.  Nely demonstrated good  understanding of the education provided. See EMR under Patient Instructions for exercises provided during therapy sessions    Assessment   Patient did pretty well with exercises. Still presents with general UE weakness however has made significant  "progress since onset of care. Notable improvements have been made in over all strength, ROM and tolerance of activities. Pt continues to present with pec and lateral trunk pain. Pt encouraged to follow up with PCP and or Gynecologist. Pt also continues to report shoulder pain but has improved over all. Progression has been limited by compliance and consistency with attendance. Pt provided updated HEP. Insurance approval exhausted at this time. Pt in agreement for DC and reports that she has been going to the gym. Pt discharging at the end of session.    Nely Is progressing well towards her goals.   Pt prognosis is Good.     Pt will continue to benefit from skilled outpatient physical therapy to address the deficits listed in the problem list box on initial evaluation, provide pt/family education and to maximize pt's level of independence in the home and community environment.   Pt's spiritual, cultural and educational needs considered and pt agreeable to plan of care and goals.     Anticipated barriers to physical therapy: None    Goals: Short Term Goals: 3 weeks   Report decreased left shld pain  < / =  4/10 "At Worst" to increase tolerance for ADLs>MET  2. Pt will present with minimal restrictions left cervical musculature improving flexibility and decreasing tension reducing cervical discomfort.> MET  4. Pt to tolerate HEP to improve ROM and independence with ADL's>MET     Long Term Goals: 6 weeks   Report decreased left shld pain  < / =  2/10 "At Worst" to increase tolerance for ADLs >NOT MET   2.improved limitation score to 37 % or less> MET   31%  3. Independent with HEP for continued improvement in ROM/Strength.>MET  4. Strength: Improve  left UE strength by 1 grade> MET  5. Pt will demonstrate AROM left shld WNL> MET    Plan   Pt discharging post session.    Extended Plan of care Certification: 8/2/2023 to 10/31/23.     Outpatient Physical Therapy 2 times weekly for 5 weeks to include the following " interventions: Electrical Stimulation IFC, Manual Therapy, Moist Heat/ Ice, Neuromuscular Re-ed, Therapeutic Activities, and Therapeutic Exercise. Other modalities as appropriate.    Mamta Escoto, PT

## 2023-09-06 ENCOUNTER — OFFICE VISIT (OUTPATIENT)
Dept: ORTHOPEDICS | Facility: CLINIC | Age: 53
End: 2023-09-06
Payer: MEDICAID

## 2023-09-06 VITALS — HEIGHT: 65 IN | BODY MASS INDEX: 47.48 KG/M2 | WEIGHT: 285 LBS

## 2023-09-06 DIAGNOSIS — Z98.890 S/P ARTHROSCOPY OF LEFT SHOULDER: ICD-10-CM

## 2023-09-06 DIAGNOSIS — M25.512 CHRONIC LEFT SHOULDER PAIN: ICD-10-CM

## 2023-09-06 DIAGNOSIS — M25.522 LEFT ELBOW PAIN: ICD-10-CM

## 2023-09-06 DIAGNOSIS — G89.29 CHRONIC LEFT SHOULDER PAIN: ICD-10-CM

## 2023-09-06 DIAGNOSIS — M77.02 MEDIAL EPICONDYLITIS, LEFT ELBOW: Primary | ICD-10-CM

## 2023-09-06 DIAGNOSIS — M25.812 IMPINGEMENT OF LEFT SHOULDER: ICD-10-CM

## 2023-09-06 PROCEDURE — 99214 OFFICE O/P EST MOD 30 MIN: CPT | Mod: 25,S$PBB,, | Performed by: ORTHOPAEDIC SURGERY

## 2023-09-06 PROCEDURE — 4010F PR ACE/ARB THEARPY RXD/TAKEN: ICD-10-PCS | Mod: CPTII,,, | Performed by: ORTHOPAEDIC SURGERY

## 2023-09-06 PROCEDURE — 1159F MED LIST DOCD IN RCRD: CPT | Mod: CPTII,,, | Performed by: ORTHOPAEDIC SURGERY

## 2023-09-06 PROCEDURE — 20605 INTERMEDIATE JOINT ASPIRATION/INJECTION: ICD-10-PCS | Mod: S$PBB,51,XS,LT | Performed by: ORTHOPAEDIC SURGERY

## 2023-09-06 PROCEDURE — 4010F ACE/ARB THERAPY RXD/TAKEN: CPT | Mod: CPTII,,, | Performed by: ORTHOPAEDIC SURGERY

## 2023-09-06 PROCEDURE — 3008F PR BODY MASS INDEX (BMI) DOCUMENTED: ICD-10-PCS | Mod: CPTII,,, | Performed by: ORTHOPAEDIC SURGERY

## 2023-09-06 PROCEDURE — 99999PBSHW PR PBB SHADOW TECHNICAL ONLY FILED TO HB: Mod: PBBFAC,,,

## 2023-09-06 PROCEDURE — 99999 PR PBB SHADOW E&M-EST. PATIENT-LVL III: CPT | Mod: PBBFAC,,, | Performed by: ORTHOPAEDIC SURGERY

## 2023-09-06 PROCEDURE — 99999PBSHW PR PBB SHADOW TECHNICAL ONLY FILED TO HB: ICD-10-PCS | Mod: PBBFAC,,,

## 2023-09-06 PROCEDURE — 20610 DRAIN/INJ JOINT/BURSA W/O US: CPT | Mod: PBBFAC,LT | Performed by: ORTHOPAEDIC SURGERY

## 2023-09-06 PROCEDURE — 99213 OFFICE O/P EST LOW 20 MIN: CPT | Mod: PBBFAC | Performed by: ORTHOPAEDIC SURGERY

## 2023-09-06 PROCEDURE — 3008F BODY MASS INDEX DOCD: CPT | Mod: CPTII,,, | Performed by: ORTHOPAEDIC SURGERY

## 2023-09-06 PROCEDURE — 99214 PR OFFICE/OUTPT VISIT, EST, LEVL IV, 30-39 MIN: ICD-10-PCS | Mod: 25,S$PBB,, | Performed by: ORTHOPAEDIC SURGERY

## 2023-09-06 PROCEDURE — 20610 DRAIN/INJ JOINT/BURSA W/O US: CPT | Mod: PBBFAC | Performed by: ORTHOPAEDIC SURGERY

## 2023-09-06 PROCEDURE — 99999 PR PBB SHADOW E&M-EST. PATIENT-LVL III: ICD-10-PCS | Mod: PBBFAC,,, | Performed by: ORTHOPAEDIC SURGERY

## 2023-09-06 PROCEDURE — 20610 LARGE JOINT ASPIRATION/INJECTION: L GLENOHUMERAL: ICD-10-PCS | Mod: S$PBB,LT,, | Performed by: ORTHOPAEDIC SURGERY

## 2023-09-06 PROCEDURE — 20605 DRAIN/INJ JOINT/BURSA W/O US: CPT | Mod: PBBFAC,51,XS,LT | Performed by: ORTHOPAEDIC SURGERY

## 2023-09-06 PROCEDURE — 1159F PR MEDICATION LIST DOCUMENTED IN MEDICAL RECORD: ICD-10-PCS | Mod: CPTII,,, | Performed by: ORTHOPAEDIC SURGERY

## 2023-09-06 RX ORDER — TRIAMCINOLONE ACETONIDE 40 MG/ML
40 INJECTION, SUSPENSION INTRA-ARTICULAR; INTRAMUSCULAR
Status: DISCONTINUED | OUTPATIENT
Start: 2023-09-06 | End: 2023-09-06 | Stop reason: HOSPADM

## 2023-09-06 RX ADMIN — TRIAMCINOLONE ACETONIDE 40 MG: 40 INJECTION, SUSPENSION INTRA-ARTICULAR; INTRAMUSCULAR at 10:09

## 2023-09-06 RX ADMIN — TRIAMCINOLONE ACETONIDE 10 MG: 10 INJECTION, SUSPENSION INTRA-ARTICULAR; INTRALESIONAL at 10:09

## 2023-09-06 NOTE — PROCEDURES
L medial epicondyleIntermediate Joint Aspiration/Injection    Date/Time: 9/6/2023 10:45 AM    Performed by: Wiliam Katz DO  Authorized by: Wiliam Katz, DO    Consent Done?:  Yes (Verbal)  Indications:  Diagnostic evaluation and pain  Site marked: The procedure site was marked    Timeout: Prior to procedure the correct patient, procedure, and site was verified      Location:  Elbow  Prep: Patient was prepped and draped in usual sterile fashion    Ultrasonic Guidance for needle placement: No  Needle size:  25 G  Approach:  Anteromedial  Medications:  10 mg triamcinolone acetonide 10 mg/mL  Patient tolerance:  Patient tolerated the procedure well with no immediate complications

## 2023-09-06 NOTE — PROCEDURES
Large Joint Aspiration/Injection: L glenohumeral    Date/Time: 9/6/2023 10:45 AM    Performed by: Wiliam Katz DO  Authorized by: Wiliam Katz, DO    Consent Done?:  Yes (Verbal)  Indications:  Diagnostic evaluation and pain  Site marked: the procedure site was marked    Timeout: prior to procedure the correct patient, procedure, and site was verified    Prep: patient was prepped and draped in usual sterile fashion      Details:  Needle Size:  22 G  Ultrasonic Guidance for needle placement?: No    Approach:  Posterior  Location:  Shoulder  Site:  L glenohumeral  Medications:  40 mg triamcinolone acetonide 40 mg/mL  Patient tolerance:  Patient tolerated the procedure well with no immediate complications

## 2023-09-06 NOTE — PROGRESS NOTES
"Subjective:      Patient ID: Nely Salas is a 53 y.o. female.    Chief Complaint: No chief complaint on file.      HPI:  Ms. Salas new complaints of about 2 months of medial left elbow pain.  She stated when she moves her elbows it causes her pain she is also noticed some swelling.  It started while she was wearing a shoulder immobilizer after a revision rotator cuff repair of her left shoulder.  She has taken NSAIDs without help.  She has not worn a brace on her elbow.  She just recently finished physical therapy for her shoulder which was also incorporating some elbow care which has not resolved the pain in her elbow she stated her shoulder has some pain in it and some "crackling".  She had an arthroscopy of her left shoulder with labrum debridement rotator cuff repair, biceps tenodesis, subacromial bursectomy, and distal clavicle resection, performed on 03/13/2023.    ROS:  No new diagnosis/ surgery/ prescriptions since last office visit on 06/07/2023  Constitution: Negative for chills and fever.   HENT: Negative for congestion.    Eyes: Negative for blurred vision.   Cardiovascular: Negative for chest pain.   Respiratory: Negative for cough.    Endocrine: Negative for polydipsia.   Hematologic/Lymphatic: Negative for adenopathy.   Skin: Negative for flushing and itching.   Musculoskeletal: Positive for back pain and joint pain. Negative for gout.   Gastrointestinal: Positive for heartburn. Negative for constipation and diarrhea.   Genitourinary: Negative for nocturia.   Neurological: Positive for headaches. Negative for seizures.   Psychiatric/Behavioral: Negative for depression and substance abuse. The patient is nervous/anxious.    Allergic/Immunologic: Positive for environmental allergies.       Objective:      Physical Exam:   General: AAOx3.  No acute distress  Vascular:  Pulses intact and equal bilaterally.  Capillary refill less than 3 seconds and equal bilaterally  Neurologic:  Pinprick and soft touch " intact and equal bilaterally  Integment:  No ecchymosis, no errythema  Extremity:  Elbow: Pronation / supination equal bilaterally 90/90 degrees.  Extension/flexion equal bilaterally 0/ 120.  Nontender over the olecranon bilaterally.  No swelling at the olecranon bilaterally.  Nontender at the triceps insertion on the olecranon bilaterally.  Triceps palpable throughout full distribution bilaterally.  Nontender at the lateral epicondyle both elbows.  Tender with palpation medial epicondyle left elbow.  Radial/ ulna stressing with good endpoint bilaterally.  Tender at the medial epicondyle of the left elbow with resisted volar flexion left wrist.                       Shoulder:  Active forward flexion/abduction equal bilaterally 0/170 degrees.  Internal rotation equal bilaterally.  External rotation equal bilaterally.  Full can negative both shoulders.  Empty can negative both shoulders.  Benjamin/Neer relatively   Mildly positive left shoulder.  Cross-arm negative both shoulders.  Nontender in the bicipital groove bilaterally.  Radiography:  No new x-rays done today      Assessment:       Impression:     1. Medial epicondylitis, left elbow    2. Left elbow pain    3. Impingement of left shoulder    4. Chronic left shoulder pain    5. S/P arthroscopy of left shoulder          Plan:       1.  Discussed physical examination and radiographic findings with the patient. Nely understands that she has medial epicondylitis of her left elbow and some impingement of her left shoulder.  Treatment alternatives and outcomes were discussed with the patient she understands she could continue to be treated conservatively with observation, activity modification, NSAIDs, bracing, physical therapy, injections, or she could consider surgical intervention such as debridement medial epicondyle and a possible repeat arthroscopy of her shoulder.  Recommend she continue with conservative management a little bit longer.  2. Offered a steroid  injection to the medial epicondyle of the left elbow, she elected to proceed.  3. Offered a steroid injection to the left shoulder, she elected to proceed.    4.  Continue using Voltaren gel applied to the shoulder and elbow twice daily and massage in for 2 minutes.  5. Continue with NSAIDs as tolerated allowed by PCM.  6. Continue doing home exercises as shown previously and shown by physical therapy.    7. Finish current physical therapy prescription and discharged to [facility].  8. Follow up p.r.n..

## 2023-09-11 ENCOUNTER — LAB VISIT (OUTPATIENT)
Dept: LAB | Facility: HOSPITAL | Age: 53
End: 2023-09-11
Attending: NURSE PRACTITIONER
Payer: MEDICAID

## 2023-09-11 ENCOUNTER — OFFICE VISIT (OUTPATIENT)
Dept: HEMATOLOGY/ONCOLOGY | Facility: CLINIC | Age: 53
End: 2023-09-11
Payer: MEDICAID

## 2023-09-11 VITALS
TEMPERATURE: 98 F | RESPIRATION RATE: 19 BRPM | HEART RATE: 94 BPM | DIASTOLIC BLOOD PRESSURE: 75 MMHG | WEIGHT: 290 LBS | SYSTOLIC BLOOD PRESSURE: 152 MMHG | OXYGEN SATURATION: 99 % | HEIGHT: 65 IN | BODY MASS INDEX: 48.32 KG/M2

## 2023-09-11 DIAGNOSIS — D50.9 IRON DEFICIENCY ANEMIA, UNSPECIFIED IRON DEFICIENCY ANEMIA TYPE: ICD-10-CM

## 2023-09-11 DIAGNOSIS — Z12.31 SCREENING MAMMOGRAM FOR BREAST CANCER: ICD-10-CM

## 2023-09-11 DIAGNOSIS — N39.0 URINARY TRACT INFECTION WITHOUT HEMATURIA, SITE UNSPECIFIED: Primary | ICD-10-CM

## 2023-09-11 LAB
ALBUMIN SERPL BCP-MCNC: 4.1 G/DL (ref 3.5–5.2)
ALP SERPL-CCNC: 77 U/L (ref 55–135)
ALT SERPL W/O P-5'-P-CCNC: 15 U/L (ref 10–44)
ANION GAP SERPL CALC-SCNC: 9 MMOL/L (ref 8–16)
AST SERPL-CCNC: 16 U/L (ref 10–40)
BASOPHILS # BLD AUTO: 0.02 K/UL (ref 0–0.2)
BASOPHILS NFR BLD: 0.2 % (ref 0–1.9)
BILIRUB SERPL-MCNC: 0.7 MG/DL (ref 0.1–1)
BUN SERPL-MCNC: 12 MG/DL (ref 6–20)
CALCIUM SERPL-MCNC: 9.1 MG/DL (ref 8.7–10.5)
CHLORIDE SERPL-SCNC: 106 MMOL/L (ref 95–110)
CO2 SERPL-SCNC: 25 MMOL/L (ref 23–29)
CREAT SERPL-MCNC: 0.9 MG/DL (ref 0.5–1.4)
DIFFERENTIAL METHOD: ABNORMAL
EOSINOPHIL # BLD AUTO: 0 K/UL (ref 0–0.5)
EOSINOPHIL NFR BLD: 0.3 % (ref 0–8)
ERYTHROCYTE [DISTWIDTH] IN BLOOD BY AUTOMATED COUNT: 24.9 % (ref 11.5–14.5)
EST. GFR  (NO RACE VARIABLE): >60 ML/MIN/1.73 M^2
FERRITIN SERPL-MCNC: 71 NG/ML (ref 20–300)
GLUCOSE SERPL-MCNC: 107 MG/DL (ref 70–110)
HCT VFR BLD AUTO: 37.4 % (ref 37–48.5)
HGB BLD-MCNC: 12.3 G/DL (ref 12–16)
IMM GRANULOCYTES # BLD AUTO: 0.04 K/UL (ref 0–0.04)
IMM GRANULOCYTES NFR BLD AUTO: 0.4 % (ref 0–0.5)
IRON SERPL-MCNC: 81 UG/DL (ref 30–160)
LYMPHOCYTES # BLD AUTO: 1.8 K/UL (ref 1–4.8)
LYMPHOCYTES NFR BLD: 17.8 % (ref 18–48)
MCH RBC QN AUTO: 28.5 PG (ref 27–31)
MCHC RBC AUTO-ENTMCNC: 32.9 G/DL (ref 32–36)
MCV RBC AUTO: 87 FL (ref 82–98)
MONOCYTES # BLD AUTO: 0.4 K/UL (ref 0.3–1)
MONOCYTES NFR BLD: 3.5 % (ref 4–15)
NEUTROPHILS # BLD AUTO: 7.9 K/UL (ref 1.8–7.7)
NEUTROPHILS NFR BLD: 77.8 % (ref 38–73)
NRBC BLD-RTO: 0 /100 WBC
PLATELET # BLD AUTO: 211 K/UL (ref 150–450)
PMV BLD AUTO: 11 FL (ref 9.2–12.9)
POTASSIUM SERPL-SCNC: 3.5 MMOL/L (ref 3.5–5.1)
PROT SERPL-MCNC: 8.1 G/DL (ref 6–8.4)
RBC # BLD AUTO: 4.32 M/UL (ref 4–5.4)
SATURATED IRON: 19 % (ref 20–50)
SODIUM SERPL-SCNC: 140 MMOL/L (ref 136–145)
TOTAL IRON BINDING CAPACITY: 428 UG/DL (ref 250–450)
TRANSFERRIN SERPL-MCNC: 289 MG/DL (ref 200–375)
WBC # BLD AUTO: 10.18 K/UL (ref 3.9–12.7)

## 2023-09-11 PROCEDURE — 80053 COMPREHEN METABOLIC PANEL: CPT | Performed by: NURSE PRACTITIONER

## 2023-09-11 PROCEDURE — 83540 ASSAY OF IRON: CPT | Performed by: NURSE PRACTITIONER

## 2023-09-11 PROCEDURE — 3008F PR BODY MASS INDEX (BMI) DOCUMENTED: ICD-10-PCS | Mod: CPTII,,, | Performed by: NURSE PRACTITIONER

## 2023-09-11 PROCEDURE — 3008F BODY MASS INDEX DOCD: CPT | Mod: CPTII,,, | Performed by: NURSE PRACTITIONER

## 2023-09-11 PROCEDURE — 1160F PR REVIEW ALL MEDS BY PRESCRIBER/CLIN PHARMACIST DOCUMENTED: ICD-10-PCS | Mod: CPTII,,, | Performed by: NURSE PRACTITIONER

## 2023-09-11 PROCEDURE — 84466 ASSAY OF TRANSFERRIN: CPT | Performed by: NURSE PRACTITIONER

## 2023-09-11 PROCEDURE — 4010F PR ACE/ARB THEARPY RXD/TAKEN: ICD-10-PCS | Mod: CPTII,,, | Performed by: NURSE PRACTITIONER

## 2023-09-11 PROCEDURE — 99214 PR OFFICE/OUTPT VISIT, EST, LEVL IV, 30-39 MIN: ICD-10-PCS | Mod: S$PBB,,, | Performed by: NURSE PRACTITIONER

## 2023-09-11 PROCEDURE — 1160F RVW MEDS BY RX/DR IN RCRD: CPT | Mod: CPTII,,, | Performed by: NURSE PRACTITIONER

## 2023-09-11 PROCEDURE — 82728 ASSAY OF FERRITIN: CPT | Performed by: NURSE PRACTITIONER

## 2023-09-11 PROCEDURE — 99999 PR PBB SHADOW E&M-EST. PATIENT-LVL IV: CPT | Mod: PBBFAC,,, | Performed by: NURSE PRACTITIONER

## 2023-09-11 PROCEDURE — 3077F SYST BP >= 140 MM HG: CPT | Mod: CPTII,,, | Performed by: NURSE PRACTITIONER

## 2023-09-11 PROCEDURE — 3078F DIAST BP <80 MM HG: CPT | Mod: CPTII,,, | Performed by: NURSE PRACTITIONER

## 2023-09-11 PROCEDURE — 99214 OFFICE O/P EST MOD 30 MIN: CPT | Mod: PBBFAC | Performed by: NURSE PRACTITIONER

## 2023-09-11 PROCEDURE — 99999 PR PBB SHADOW E&M-EST. PATIENT-LVL IV: ICD-10-PCS | Mod: PBBFAC,,, | Performed by: NURSE PRACTITIONER

## 2023-09-11 PROCEDURE — 36415 COLL VENOUS BLD VENIPUNCTURE: CPT | Performed by: NURSE PRACTITIONER

## 2023-09-11 PROCEDURE — 4010F ACE/ARB THERAPY RXD/TAKEN: CPT | Mod: CPTII,,, | Performed by: NURSE PRACTITIONER

## 2023-09-11 PROCEDURE — 3077F PR MOST RECENT SYSTOLIC BLOOD PRESSURE >= 140 MM HG: ICD-10-PCS | Mod: CPTII,,, | Performed by: NURSE PRACTITIONER

## 2023-09-11 PROCEDURE — 1159F PR MEDICATION LIST DOCUMENTED IN MEDICAL RECORD: ICD-10-PCS | Mod: CPTII,,, | Performed by: NURSE PRACTITIONER

## 2023-09-11 PROCEDURE — 1159F MED LIST DOCD IN RCRD: CPT | Mod: CPTII,,, | Performed by: NURSE PRACTITIONER

## 2023-09-11 PROCEDURE — 3078F PR MOST RECENT DIASTOLIC BLOOD PRESSURE < 80 MM HG: ICD-10-PCS | Mod: CPTII,,, | Performed by: NURSE PRACTITIONER

## 2023-09-11 PROCEDURE — 99214 OFFICE O/P EST MOD 30 MIN: CPT | Mod: S$PBB,,, | Performed by: NURSE PRACTITIONER

## 2023-09-11 PROCEDURE — 85025 COMPLETE CBC W/AUTO DIFF WBC: CPT | Performed by: NURSE PRACTITIONER

## 2023-09-11 RX ORDER — LEVOFLOXACIN 250 MG/1
250 TABLET ORAL DAILY
Qty: 5 TABLET | Refills: 0 | Status: SHIPPED | OUTPATIENT
Start: 2023-09-11 | End: 2023-10-03

## 2023-09-11 NOTE — PROGRESS NOTES
Intial Visit New Patient: Nely is a 53 y.o. female who has self-referred to [facility] for iron deficiency anemia.  She still has her cycles and reports occasional menorrhagia with them.  Patient reports she had colonoscopy approximately 2 years ago an upper GI scope last year.  She had these performed at [facility] in [place].  He is unable to tolerate oral iron due to GI side effects.  She has received Injectafer with good results in the past      9/11/2023:  She returns today follow-up.  She is complaining today of UTI.  She had labs drawn immediately prior to this visit.  CBC has resulted CMP and iron stores are pending    Review of Systems   Constitutional:  Positive for malaise/fatigue.   HENT: Negative.     Eyes: Negative.    Respiratory: Negative.     Cardiovascular: Negative.    Gastrointestinal: Negative.    Genitourinary:  Positive for dysuria and frequency.   Musculoskeletal: Negative.    Skin: Negative.    Neurological: Negative.    Endo/Heme/Allergies: Negative.    Psychiatric/Behavioral: Negative.        Past Medical History:   Diagnosis Date    Anemia, unspecified     Diabetes mellitus, type 2     Diabetic neuropathy     GERD (gastroesophageal reflux disease)     Hypertension     Sleep apnea       Patient Active Problem List   Diagnosis    Iron deficiency anemia    Morbid obesity with body mass index (BMI) of 40.0 to 49.9    Chronic low back pain    Acute cystitis    Greater trochanteric bursitis, left    Primary osteoarthritis of both hips    Radicular pain of left lower extremity    Fissure in skin    Osteoarthritis of ankle and foot    Neuritis    Fibromyalgia    Fissure in skin of foot    Type 2 diabetes mellitus without complication, without long-term current use of insulin    Acute gout of shoulder    Type II diabetes mellitus with neurological manifestations    Comprehensive diabetic foot examination, type 2 DM, encounter for    Atopic neurodermatitis    Trigger finger of left thumb     Traumatic incomplete tear of left rotator cuff    Superior labrum anterior-to-posterior (SLAP) tear of left shoulder    Chronic left shoulder pain    Muscle pain, cervical    Elbow pain, left    Decreased ROM of left shoulder    Impaired strength of shoulder muscles    Recurrent candidiasis of vagina    Cutaneous candidiasis    Iron deficiency anemia due to chronic blood loss      Past Surgical History:   Procedure Laterality Date    ARTHROSCOPY OF SHOULDER WITH DECOMPRESSION OF SUBACROMIAL SPACE Left 3/13/2023    Procedure: ARTHROSCOPY, SHOULDER, WITH SUBACROMIAL SPACE DECOMPRESSION;  Surgeon: Wiliam Katz DO;  Location: [facility] OR;  Service: Orthopedics;  Laterality: Left;    ARTHROSCOPY OF SHOULDER WITH REMOVAL OF DISTAL CLAVICLE Left 3/13/2023    Procedure: ARTHROSCOPY, SHOULDER, WITH DISTAL CLAVICLE EXCISION;  Surgeon: Wiliam Katz DO;  Location: [facility] OR;  Service: Orthopedics;  Laterality: Left;    ARTHROSCOPY,SHOULDER,WITH BICEPS TENODESIS Left 3/13/2023    Procedure: ARTHROSCOPY,SHOULDER,WITH BICEPS TENODESIS;  Surgeon: Wiliam Katz DO;  Location: [facility] OR;  Service: Orthopedics;  Laterality: Left;     SECTION      CHOLECYSTECTOMY      FALLOPIAN TUBOPLASTY      SHOULDER ARTHROSCOPY Left 3/13/2023    Procedure: Arhtroscopy Left Shouler with Possible Open Rotator Cuff Repair;  Surgeon: Wiliam Katz DO;  Location: [facility] OR;  Service: Orthopedics;  Laterality: Left;    TRIGGER FINGER RELEASE Left 2022    Procedure: TRIGGER FINGER RELEASE LEFT THUMB;  Surgeon: Wiliam Katz DO;  Location: [facility] OR;  Service: Orthopedics;  Laterality: Left;      Current Outpatient Medications   Medication Sig Dispense Refill    cetirizine (ZYRTEC) 10 MG tablet Take 10 mg by mouth once daily.      EASY TOUCH TWIST LANCETS 30 gauge Misc MONITOR BLOOD GLUCOSE DAILY.      lisinopriL (PRINIVIL,ZESTRIL) 20 MG tablet Take by mouth.      oxyCODONE (ROXICODONE) 15 MG Tab Take 15 mg by mouth every 4 (four)  hours as needed.      pantoprazole (PROTONIX) 20 MG tablet Take 1 tablet (20 mg total) by mouth once daily. 30 tablet 1    pregabalin (LYRICA) 100 MG capsule Take 100 mg by mouth 2 (two) times daily.      promethazine (PHENERGAN) 25 MG tablet TAKE ONE TABLET BY MOUTH EVERY NIGHT AT BEDTIME AS NEEDED FOR NAUSEA AND VOMITING      traZODone (DESYREL) 100 MG tablet Take 200 mg by mouth nightly as needed.      zolpidem (AMBIEN) 10 mg Tab Take 10 mg by mouth nightly as needed.      levoFLOXacin (LEVAQUIN) 250 MG tablet Take 1 tablet (250 mg total) by mouth once daily. 5 tablet 0     No current facility-administered medications for this visit.      Review of patient's allergies indicates:   Allergen Reactions    Bactrim [sulfamethoxazole-trimethoprim] Nausea And Vomiting    Zithromax [azithromycin] Nausea And Vomiting    Clotrimazole-betamethasone Other (See Comments)     Makes patient's feet red and cracks the skin more    Ketorolac Other (See Comments)     PT STATES IT DOES NOT WORK FOR HER    Morphine Hives    Potassium clavulanate      Other reaction(s): Unknown    Sulfamethoxazole Other (See Comments)    Trimethoprim Other (See Comments)    Amoxicillin trihydrate Nausea Only and Other (See Comments)    Sulfa (sulfonamide antibiotics) Nausea And Vomiting     PHYSICAL EXAM:     Vitals:    09/11/23 1253   BP: (!) 152/75   Pulse: 94   Resp: 19   Temp: 98.3 °F (36.8 °C)       GENERAL: Comfortable looking patient. Patient is in no distress.  Awake, alert and oriented to time, person and place.  No anxiety, or agitation.      HEENT: Normal conjunctivae and eyelids. WNL.  PERRLA 3 to 4 mm. No icterus, no pallor, no congestion, and no discharge noted.     NECK:  Supple. Trachea is central.  No crepitus.  No JVD or masses.    RESPIRATORY:  No intercostal retractions.  No dullness to percussion.  Chest is clear to auscultation.  No rales, rhonchi or wheezes.  No crepitus.  Good air entry bilaterally.    CARDIOVASCULAR:  S1 and  S2 are normally heard without murmurs or gallops.  All peripheral pulses are present.    ABDOMEN:  Normal abdomen.  No hepatosplenomegaly.  No free fluid.  Bowel sounds are present.  No hernia noted. No masses.  No rebound or tenderness.  No guarding or rigidity.  Umbilicus is midline.    LYMPHATICS:  No axillary, cervical, supraclavicular, submental, or inguinal lymphadenopathy.    SKIN/MUSCULOSKELETAL:  There is no evidence of excoriation marks or ecchmosis.  No rashes.  No cyanosis.  No clubbing.  No joint or skeletal deformities noted.  Normal range of motion.    NEUROLOGIC:  Higher functions are appropriate.  No cranial nerve deficits.  Normal yarelis.  Normal strength.  Motor and sensory functions are normal.  Deep tendon reflexes are normal.    GENITAL/RECTAL:  Exams are deferred.       Assessment/plan:     Iron deficiency anemia:   -unable to tolerate oral iron  -completed Injectafer x2 doses  -anemia has resolved  -I will follow-up on pending labs  -see me in 4 weeks with repeat labs    Acid reflux:   -Protonix 20 mg p.o. daily  -patient states she is no longer on Nexium    UTI:   -obtain UA with culture  -Levaquin 250 mg p.o. daily x5 days    Hypertension:   -recent change made to her blood pressure medicine  -follow-up with PCP

## 2023-09-14 ENCOUNTER — TELEPHONE (OUTPATIENT)
Dept: ORTHOPEDICS | Facility: CLINIC | Age: 53
End: 2023-09-14
Payer: MEDICAID

## 2023-09-14 NOTE — TELEPHONE ENCOUNTER
Returned call. LVM for the patient to call back to address their questions and concerns.    ----- Message from Reagan Banks sent at 9/14/2023 10:02 AM CDT -----  Contact: pt  Type: Needs Medical Advice  Who Called:  pt  Best Call Back Number: [phone]    Additional Information: Pt is calling needs to speak with nurse regarding right shoulder.Please call back and advise.

## 2023-09-18 ENCOUNTER — TELEPHONE (OUTPATIENT)
Dept: PODIATRY | Facility: CLINIC | Age: 53
End: 2023-09-18
Payer: MEDICAID

## 2023-10-03 ENCOUNTER — OFFICE VISIT (OUTPATIENT)
Dept: PODIATRY | Facility: CLINIC | Age: 53
End: 2023-10-03
Payer: MEDICAID

## 2023-10-03 VITALS
HEIGHT: 65 IN | DIASTOLIC BLOOD PRESSURE: 81 MMHG | HEART RATE: 73 BPM | WEIGHT: 285.81 LBS | SYSTOLIC BLOOD PRESSURE: 134 MMHG | BODY MASS INDEX: 47.62 KG/M2

## 2023-10-03 DIAGNOSIS — E11.49 TYPE II DIABETES MELLITUS WITH NEUROLOGICAL MANIFESTATIONS: ICD-10-CM

## 2023-10-03 DIAGNOSIS — E11.9 COMPREHENSIVE DIABETIC FOOT EXAMINATION, TYPE 2 DM, ENCOUNTER FOR: Primary | ICD-10-CM

## 2023-10-03 DIAGNOSIS — L30.8 OTHER ECZEMA: ICD-10-CM

## 2023-10-03 DIAGNOSIS — G57.93 NEUROPATHIC PAIN OF BOTH FEET: ICD-10-CM

## 2023-10-03 DIAGNOSIS — R23.4 PEELING SKIN: ICD-10-CM

## 2023-10-03 DIAGNOSIS — L40.9 PSORIASIS: ICD-10-CM

## 2023-10-03 DIAGNOSIS — B35.1 ONYCHOMYCOSIS OF TOENAIL: ICD-10-CM

## 2023-10-03 PROCEDURE — 1159F MED LIST DOCD IN RCRD: CPT | Mod: CPTII,,, | Performed by: PODIATRIST

## 2023-10-03 PROCEDURE — 1160F RVW MEDS BY RX/DR IN RCRD: CPT | Mod: CPTII,,, | Performed by: PODIATRIST

## 2023-10-03 PROCEDURE — 1160F PR REVIEW ALL MEDS BY PRESCRIBER/CLIN PHARMACIST DOCUMENTED: ICD-10-PCS | Mod: CPTII,,, | Performed by: PODIATRIST

## 2023-10-03 PROCEDURE — 3075F PR MOST RECENT SYSTOLIC BLOOD PRESS GE 130-139MM HG: ICD-10-PCS | Mod: CPTII,,, | Performed by: PODIATRIST

## 2023-10-03 PROCEDURE — 1159F PR MEDICATION LIST DOCUMENTED IN MEDICAL RECORD: ICD-10-PCS | Mod: CPTII,,, | Performed by: PODIATRIST

## 2023-10-03 PROCEDURE — 3075F SYST BP GE 130 - 139MM HG: CPT | Mod: CPTII,,, | Performed by: PODIATRIST

## 2023-10-03 PROCEDURE — 3008F PR BODY MASS INDEX (BMI) DOCUMENTED: ICD-10-PCS | Mod: CPTII,,, | Performed by: PODIATRIST

## 2023-10-03 PROCEDURE — 4010F PR ACE/ARB THEARPY RXD/TAKEN: ICD-10-PCS | Mod: CPTII,,, | Performed by: PODIATRIST

## 2023-10-03 PROCEDURE — 99215 OFFICE O/P EST HI 40 MIN: CPT | Mod: S$PBB,,, | Performed by: PODIATRIST

## 2023-10-03 PROCEDURE — 99214 OFFICE O/P EST MOD 30 MIN: CPT | Mod: PBBFAC | Performed by: PODIATRIST

## 2023-10-03 PROCEDURE — 99215 PR OFFICE/OUTPT VISIT, EST, LEVL V, 40-54 MIN: ICD-10-PCS | Mod: S$PBB,,, | Performed by: PODIATRIST

## 2023-10-03 PROCEDURE — 4010F ACE/ARB THERAPY RXD/TAKEN: CPT | Mod: CPTII,,, | Performed by: PODIATRIST

## 2023-10-03 PROCEDURE — 99999 PR PBB SHADOW E&M-EST. PATIENT-LVL IV: CPT | Mod: PBBFAC,,, | Performed by: PODIATRIST

## 2023-10-03 PROCEDURE — 3008F BODY MASS INDEX DOCD: CPT | Mod: CPTII,,, | Performed by: PODIATRIST

## 2023-10-03 PROCEDURE — 99999 PR PBB SHADOW E&M-EST. PATIENT-LVL IV: ICD-10-PCS | Mod: PBBFAC,,, | Performed by: PODIATRIST

## 2023-10-03 PROCEDURE — 3079F DIAST BP 80-89 MM HG: CPT | Mod: CPTII,,, | Performed by: PODIATRIST

## 2023-10-03 PROCEDURE — 3079F PR MOST RECENT DIASTOLIC BLOOD PRESSURE 80-89 MM HG: ICD-10-PCS | Mod: CPTII,,, | Performed by: PODIATRIST

## 2023-10-03 RX ORDER — LOSARTAN POTASSIUM 25 MG/1
25 TABLET ORAL
COMMUNITY
Start: 2023-08-31 | End: 2023-10-03

## 2023-10-03 RX ORDER — ZOLPIDEM TARTRATE 12.5 MG/1
12.5 TABLET, FILM COATED, EXTENDED RELEASE ORAL NIGHTLY PRN
COMMUNITY
Start: 2023-06-30 | End: 2023-10-03 | Stop reason: ALTCHOICE

## 2023-10-03 RX ORDER — PREGABALIN 300 MG/1
300 CAPSULE ORAL 3 TIMES DAILY
COMMUNITY
Start: 2023-09-29

## 2023-10-03 RX ORDER — ONDANSETRON 4 MG/1
4 TABLET, ORALLY DISINTEGRATING ORAL EVERY 6 HOURS PRN
COMMUNITY
Start: 2023-01-07 | End: 2023-10-03

## 2023-10-03 RX ORDER — NORETHINDRONE 5 MG/1
5 TABLET ORAL
COMMUNITY
Start: 2023-09-28 | End: 2023-10-03 | Stop reason: SDUPTHER

## 2023-10-03 RX ORDER — PREGABALIN 300 MG/1
300 CAPSULE ORAL
COMMUNITY
Start: 2023-09-14 | End: 2023-10-03 | Stop reason: SDUPTHER

## 2023-10-03 RX ORDER — CLOBETASOL PROPIONATE 0.5 MG/G
OINTMENT TOPICAL 2 TIMES DAILY
Qty: 60 G | Refills: 2 | Status: SHIPPED | OUTPATIENT
Start: 2023-10-03 | End: 2024-01-25

## 2023-10-03 RX ORDER — OXYCODONE AND ACETAMINOPHEN 10; 325 MG/1; MG/1
1 TABLET ORAL EVERY 8 HOURS PRN
COMMUNITY
Start: 2022-10-04 | End: 2023-10-03

## 2023-10-03 RX ORDER — IBUPROFEN 800 MG/1
800 TABLET ORAL EVERY 8 HOURS PRN
COMMUNITY
Start: 2023-04-13

## 2023-10-03 RX ORDER — ROSUVASTATIN CALCIUM 20 MG/1
TABLET, COATED ORAL
COMMUNITY
Start: 2023-06-23 | End: 2023-10-03

## 2023-10-03 RX ORDER — FAMOTIDINE 20 MG/1
20 TABLET, FILM COATED ORAL
COMMUNITY
Start: 2022-10-28

## 2023-10-03 RX ORDER — CLONIDINE HYDROCHLORIDE 0.1 MG/1
0.1 TABLET ORAL
COMMUNITY
Start: 2023-10-02 | End: 2024-04-01

## 2023-10-03 RX ORDER — PROMETHAZINE HYDROCHLORIDE 25 MG/1
25 SUPPOSITORY RECTAL EVERY 6 HOURS PRN
COMMUNITY
Start: 2023-01-07 | End: 2023-10-03

## 2023-10-03 RX ORDER — LACTULOSE 10 G/15ML
20 SOLUTION ORAL; RECTAL
COMMUNITY
Start: 2022-08-26 | End: 2023-10-03

## 2023-10-03 RX ORDER — NORETHINDRONE 5 MG/1
TABLET ORAL
COMMUNITY
Start: 2023-09-28

## 2023-10-03 RX ORDER — DILTIAZEM HYDROCHLORIDE 120 MG/1
120 CAPSULE, EXTENDED RELEASE ORAL
COMMUNITY
Start: 2023-07-18 | End: 2023-10-03

## 2023-10-03 RX ORDER — HYDROGEN PEROXIDE 3 %
20 SOLUTION, NON-ORAL MISCELLANEOUS
COMMUNITY
End: 2023-10-03

## 2023-10-03 RX ORDER — CIPROFLOXACIN 500 MG/1
500 TABLET ORAL
COMMUNITY
Start: 2022-10-25 | End: 2023-10-03

## 2023-10-03 RX ORDER — METRONIDAZOLE 500 MG/1
500 TABLET ORAL
COMMUNITY
Start: 2022-10-25 | End: 2023-10-03

## 2023-10-03 RX ORDER — TRAZODONE HYDROCHLORIDE 100 MG/1
100 TABLET ORAL
COMMUNITY
Start: 2023-09-14 | End: 2024-04-01

## 2023-10-03 RX ORDER — PROMETHAZINE HYDROCHLORIDE 25 MG/1
25 TABLET ORAL EVERY 6 HOURS PRN
COMMUNITY
Start: 2023-01-07 | End: 2023-10-03

## 2023-10-03 RX ORDER — PREGABALIN 200 MG/1
CAPSULE ORAL
COMMUNITY
Start: 2023-06-01 | End: 2023-10-03

## 2023-10-03 RX ORDER — AMLODIPINE BESYLATE 5 MG/1
5 TABLET ORAL 2 TIMES DAILY
COMMUNITY
Start: 2023-09-14 | End: 2023-10-03

## 2023-10-03 RX ORDER — DILTIAZEM HYDROCHLORIDE 120 MG/1
120 CAPSULE, COATED, EXTENDED RELEASE ORAL
COMMUNITY
Start: 2023-07-18 | End: 2023-10-03

## 2023-10-03 RX ORDER — PHENAZOPYRIDINE HYDROCHLORIDE 100 MG/1
TABLET, FILM COATED ORAL
COMMUNITY
Start: 2023-05-11 | End: 2023-10-03

## 2023-10-03 RX ORDER — CITALOPRAM 20 MG/1
20 TABLET, FILM COATED ORAL NIGHTLY
COMMUNITY
Start: 2023-08-04 | End: 2024-04-01

## 2023-10-03 RX ORDER — NITROFURANTOIN 25; 75 MG/1; MG/1
100 CAPSULE ORAL EVERY 12 HOURS
COMMUNITY
Start: 2023-04-21 | End: 2023-10-03

## 2023-10-03 NOTE — PROGRESS NOTES
Subjective:       Patient ID: Nely Salas is a 53 y.o. female.    Chief Complaint: Follow-up, Diabetes Mellitus, Foot Pain, and Skin Problem  Patient presents with complaint of increased dry skin, peeling and neuropathic pain in her feet.  Relates diabetes is unchanged, she feels it is well controlled, has not had a recent A1c.  Has not checked glucose recently.  We had initially use clotrimazole/betamethasone cream and she states this same to make dry skin worse.  She has tried multiple over-the-counter treatments with no improvement.  She relates neuropathy pain has gotten much worse and states there has been no change with her diabetes.  Tries to take the Lyrica only when needed since it makes her tired during the day, takes at night.  Relates feet feel squishy, puffy, stiff, difficult to bend her toes, very painful to walk.  Pain level 10/10  Due for annual diabetic foot exam    Past Medical History:   Diagnosis Date    Anemia, unspecified     Diabetes mellitus, type 2     Diabetic neuropathy     GERD (gastroesophageal reflux disease)     Hypertension     Sleep apnea      Past Surgical History:   Procedure Laterality Date    ARTHROSCOPY OF SHOULDER WITH DECOMPRESSION OF SUBACROMIAL SPACE Left 3/13/2023    Procedure: ARTHROSCOPY, SHOULDER, WITH SUBACROMIAL SPACE DECOMPRESSION;  Surgeon: Wiliam Katz DO;  Location: [facility] OR;  Service: Orthopedics;  Laterality: Left;    ARTHROSCOPY OF SHOULDER WITH REMOVAL OF DISTAL CLAVICLE Left 3/13/2023    Procedure: ARTHROSCOPY, SHOULDER, WITH DISTAL CLAVICLE EXCISION;  Surgeon: Wiliam Katz DO;  Location: [facility] OR;  Service: Orthopedics;  Laterality: Left;    ARTHROSCOPY,SHOULDER,WITH BICEPS TENODESIS Left 3/13/2023    Procedure: ARTHROSCOPY,SHOULDER,WITH BICEPS TENODESIS;  Surgeon: Wiliam Katz DO;  Location: [facility] OR;  Service: Orthopedics;  Laterality: Left;     SECTION      CHOLECYSTECTOMY      FALLOPIAN TUBOPLASTY      SHOULDER ARTHROSCOPY Left  3/13/2023    Procedure: Arhtroscopy Left Shouler with Possible Open Rotator Cuff Repair;  Surgeon: Wiliam Katz DO;  Location: [facility] OR;  Service: Orthopedics;  Laterality: Left;    TRIGGER FINGER RELEASE Left 6/13/2022    Procedure: TRIGGER FINGER RELEASE LEFT THUMB;  Surgeon: Wiliam Katz DO;  Location: [facility] OR;  Service: Orthopedics;  Laterality: Left;     History reviewed. No pertinent family history.  Social History     Socioeconomic History    Marital status:    Tobacco Use    Smoking status: Never    Smokeless tobacco: Never   Substance and Sexual Activity    Alcohol use: Yes     Comment: social    Drug use: Never    Sexual activity: Yes     Partners: Male       Current Outpatient Medications   Medication Sig Dispense Refill    citalopram (CELEXA) 20 MG tablet Take 20 mg by mouth every evening.      cloNIDine (CATAPRES) 0.1 MG tablet 0.1 mg.      EASY TOUCH TWIST LANCETS 30 gauge Misc MONITOR BLOOD GLUCOSE DAILY.      famotidine (PEPCID) 20 MG tablet 20 mg.      ibuprofen (ADVIL,MOTRIN) 800 MG tablet Take 800 mg by mouth every 8 (eight) hours as needed.      oxyCODONE (ROXICODONE) 15 MG Tab Take 15 mg by mouth every 4 (four) hours as needed.      pantoprazole (PROTONIX) 20 MG tablet Take 1 tablet (20 mg total) by mouth once daily. 30 tablet 1    pregabalin (LYRICA) 300 MG Cap Take 300 mg by mouth 3 (three) times daily.      traZODone (DESYREL) 100 MG tablet 100 mg.      zolpidem (AMBIEN) 10 mg Tab Take 10 mg by mouth nightly as needed.      clobetasol 0.05% (TEMOVATE) 0.05 % Oint Apply topically 2 (two) times daily. 60 g 2    norethindrone (AYGESTIN) 5 mg Tab   See Instructions, Take on days 14-25 of cycle, # 12 tab, 6 Refill(s), Maintenance, [facility], 163, cm, 09/28/23 13:39:00 CDT, Height/Length Measured, 129.9, kg, 09/28/23 13:39:00 CDT, Weight Dosing       No current facility-administered medications for this visit.     Review of patient's allergies  "indicates:   Allergen Reactions    Bactrim [sulfamethoxazole-trimethoprim] Nausea And Vomiting    Zithromax [azithromycin] Nausea And Vomiting    Clotrimazole-betamethasone Other (See Comments)     Makes patient's feet red and cracks the skin more    Ketorolac Other (See Comments)     PT STATES IT DOES NOT WORK FOR HER    Morphine Hives    Potassium clavulanate      Other reaction(s): Unknown    Sulfamethoxazole Other (See Comments)    Trimethoprim Other (See Comments)    Amoxicillin trihydrate Nausea Only and Other (See Comments)    Sulfa (sulfonamide antibiotics) Nausea And Vomiting       Review of Systems   Cardiovascular:  Positive for leg swelling.   Musculoskeletal:  Positive for gait problem.   All other systems reviewed and are negative.      Objective:      Vitals:    10/03/23 1010   BP: 134/81   Pulse: 73   Weight: 129.6 kg (285 lb 12.8 oz)   Height: 5' 5" (1.651 m)     Physical Exam  Vitals and nursing note reviewed.   Constitutional:       General: She is not in acute distress.     Appearance: Normal appearance.   Cardiovascular:      Pulses:           Dorsalis pedis pulses are 2+ on the right side and 2+ on the left side.        Posterior tibial pulses are 2+ on the right side and 2+ on the left side.   Pulmonary:      Effort: Pulmonary effort is normal.   Musculoskeletal:         General: Swelling and tenderness present.      Right foot: Decreased range of motion (limted AJ DF b/l).      Left foot: Decreased range of motion.      Comments: Pain across palpation forefoot bilateral consistent with inflammation, additionally may be contributing to neuropathy with some underlying neuritis, positive pedal edema bilateral   Feet:      Right foot:      Protective Sensation: 6 sites tested.  6 sites sensed.      Skin integrity: Dry skin present. No erythema.      Toenail Condition: Right toenails are abnormally thick. Fungal disease present.     Left foot:      Protective Sensation: 6 sites tested.  6 sites " sensed.      Skin integrity: Dry skin (severe dry skin, thickened, peeling skin, no skin breaks, fissures) present. No erythema.      Toenail Condition: Left toenails are abnormally thick. Fungal disease present.  Skin:     General: Skin is dry.      Capillary Refill: Capillary refill takes less than 2 seconds.      Comments: Dry skin dermatitis with chronic tinea pedis especially noted along the edges medial and laterally, large areas of peeling skin plantar feet   Neurological:      General: No focal deficit present.      Mental Status: She is alert.      Comments: Painful paresthesias feet, painful diabetic neuropathy   Psychiatric:         Behavior: Behavior normal.         Thought Content: Thought content normal.               HEMOGLOBIN A1c          Component Ref Range & Units 1 yr ago  (9/21/22) 1 yr ago  (8/4/22)   Hemoglobin A1C 5.7 - 6.4 % 6.9 High   7.7 High                                                               Assessment:       1. Comprehensive diabetic foot examination, type 2 DM, encounter for    2. Type II diabetes mellitus with neurological manifestations    3. Other eczema    4. Neuropathic pain of both feet    5. Psoriasis    6. Peeling skin    7. Onychomycosis of toenail            Plan:              CLOBETASOL  SARTINS KETAMINE NEUROPATHY CREAM ORDERED    Comprehensive diabetic pedal exam performed  Reviewed diabetic education  Reviewed benefit of controled glucose/diabetes regarding potential foot problems especially neuropathy.   Advised patient she should be checking glucose daily and it is important to keep her A1c controlled, these numbers/control of her diabetes directly affects her neuropathy pain  We discussed neuropathy and medication she is currently taking  Reviewed with patient however there is a significant amount of inflammation in her feet, this can additionally increase her neuropathy pain.  Advised patient IM steroid injection is possibility as long as her glucose is  normal this morning, patient related she is due to have a steroid injection in her back within the next week and advised patient she would need to wait at least a month after her back injection and have a normal glucose the morning before we administered and IM steroid injection for the inflammation in her feet  In the meantime advised patient topical treatment can be very effective but it needs to be done daily.  Reviewed ice/cool therapy in frequency this should be performed  We discussed over-the-counter Voltaren gel or Aspercreme, it would be rubbed on the top of her feet only  Discussed pain compound cream for neuropathy through [facility] and patient asked to have an order sent in to check the price  We discussed chronic skin issue on her feet, advised patient she does have evidence of athlete's foot, this is mainly on the sides and since she did not do well with the clotrimazole/betamethasone cream we will try just a strong steroid cream.  Advised patient this is typically used for psoriasis/eczema which I do feel is partially involved in this chronic rash.  Advised patient she would need to see a dermatologist to help rule out the psoriasis  Prescribed clobetasol instructed to apply twice daily x2 weeks, then spot treat any additional areas.  We discussed more mild over-the-counter lotions for sensitive skin, lotions with oatmeal, CeraVe day   Reviewed appropriate shoes,  especially indoors to protect feet, no flat shoes, slippers or walking in sock or bare feet.    Discussed maintenance of skin and nails and potential complications.  Some of the larger areas of peeling skin were debrided with healthy skin underneath.  There are no pustules, blisters, open areas or cracks in the skin.  We reviewed better maintenance of nails, nails were debrided.  Patient does go for pedicures.    Reviewed need for daily foot checks and instructed patient to contact the office with any area of redness or swelling which has not  improved within 3 days.  Patient was in understanding and agreement with treatment plan.  I counseled the patient on their conditions, implications and medical management.  Instructed patient to contact the office with any changes, questions, concerns, worsening of symptoms.   Total face to face time >40 minutes, exam, assessment, treatment, discussion, additional time for review of chart prior to and following appointment and visit documentation, consultation and coordination of care.    Follow up as needed      This note was created using M*Modal voice recognition software that occasionally misinterpreted phrases or words.

## 2023-10-11 ENCOUNTER — TELEPHONE (OUTPATIENT)
Dept: ORTHOPEDICS | Facility: CLINIC | Age: 53
End: 2023-10-11
Payer: MEDICAID

## 2023-10-11 NOTE — TELEPHONE ENCOUNTER
Returned call. The patient stated she has a prior engagement on 10/18/23 and needed to reschedule her appointment. The patient was offered and agreeable to an appointment on 10/17/23 in [place] with Dr. Katz.    ----- Message from Carter Brothers sent at 10/11/2023  1:51 PM CDT -----  Contact: peggy  Type:  Needs Medical Advice    Who Called: patient   Would the patient rather a call back or a response via [facility]? Call back   Best Call Back Number: [phone]  Additional Information: pt would like to reschedule her appt on 10/18/2023 please assist

## 2023-10-17 ENCOUNTER — OFFICE VISIT (OUTPATIENT)
Dept: ORTHOPEDICS | Facility: CLINIC | Age: 53
End: 2023-10-17
Payer: MEDICAID

## 2023-10-17 VITALS — OXYGEN SATURATION: 100 % | WEIGHT: 285 LBS | HEIGHT: 65 IN | HEART RATE: 75 BPM | BODY MASS INDEX: 47.48 KG/M2

## 2023-10-17 DIAGNOSIS — M75.22 TENDONITIS OF UPPER BICEPS TENDON OF LEFT SHOULDER: Primary | ICD-10-CM

## 2023-10-17 DIAGNOSIS — M75.82 PECTORALIS MAJOR TENDONITIS, LEFT: ICD-10-CM

## 2023-10-17 DIAGNOSIS — G89.29 CHRONIC LEFT SHOULDER PAIN: ICD-10-CM

## 2023-10-17 DIAGNOSIS — M25.512 CHRONIC LEFT SHOULDER PAIN: ICD-10-CM

## 2023-10-17 PROCEDURE — 1159F PR MEDICATION LIST DOCUMENTED IN MEDICAL RECORD: ICD-10-PCS | Mod: CPTII,,, | Performed by: ORTHOPAEDIC SURGERY

## 2023-10-17 PROCEDURE — 99213 OFFICE O/P EST LOW 20 MIN: CPT | Mod: PBBFAC,PN | Performed by: ORTHOPAEDIC SURGERY

## 2023-10-17 PROCEDURE — 99999 PR PBB SHADOW E&M-EST. PATIENT-LVL III: CPT | Mod: PBBFAC,,, | Performed by: ORTHOPAEDIC SURGERY

## 2023-10-17 PROCEDURE — 4010F ACE/ARB THERAPY RXD/TAKEN: CPT | Mod: CPTII,,, | Performed by: ORTHOPAEDIC SURGERY

## 2023-10-17 PROCEDURE — 99999 PR PBB SHADOW E&M-EST. PATIENT-LVL III: ICD-10-PCS | Mod: PBBFAC,,, | Performed by: ORTHOPAEDIC SURGERY

## 2023-10-17 PROCEDURE — 3008F BODY MASS INDEX DOCD: CPT | Mod: CPTII,,, | Performed by: ORTHOPAEDIC SURGERY

## 2023-10-17 PROCEDURE — 99213 OFFICE O/P EST LOW 20 MIN: CPT | Mod: 25,S$PBB,, | Performed by: ORTHOPAEDIC SURGERY

## 2023-10-17 PROCEDURE — 3008F PR BODY MASS INDEX (BMI) DOCUMENTED: ICD-10-PCS | Mod: CPTII,,, | Performed by: ORTHOPAEDIC SURGERY

## 2023-10-17 PROCEDURE — 99999PBSHW PR PBB SHADOW TECHNICAL ONLY FILED TO HB: ICD-10-PCS | Mod: PBBFAC,,,

## 2023-10-17 PROCEDURE — 99213 PR OFFICE/OUTPT VISIT, EST, LEVL III, 20-29 MIN: ICD-10-PCS | Mod: 25,S$PBB,, | Performed by: ORTHOPAEDIC SURGERY

## 2023-10-17 PROCEDURE — 99999PBSHW PR PBB SHADOW TECHNICAL ONLY FILED TO HB: Mod: PBBFAC,,,

## 2023-10-17 PROCEDURE — 20550 NJX 1 TENDON SHEATH/LIGAMENT: CPT | Mod: PBBFAC,PN,LT | Performed by: ORTHOPAEDIC SURGERY

## 2023-10-17 PROCEDURE — 20550 NJX 1 TENDON SHEATH/LIGAMENT: CPT | Mod: S$PBB,LT,, | Performed by: ORTHOPAEDIC SURGERY

## 2023-10-17 PROCEDURE — 1159F MED LIST DOCD IN RCRD: CPT | Mod: CPTII,,, | Performed by: ORTHOPAEDIC SURGERY

## 2023-10-17 PROCEDURE — 20550 PR INJECT TENDON SHEATH/LIGAMENT: ICD-10-PCS | Mod: S$PBB,LT,, | Performed by: ORTHOPAEDIC SURGERY

## 2023-10-17 PROCEDURE — 4010F PR ACE/ARB THEARPY RXD/TAKEN: ICD-10-PCS | Mod: CPTII,,, | Performed by: ORTHOPAEDIC SURGERY

## 2023-10-17 RX ADMIN — TRIAMCINOLONE ACETONIDE 10 MG: 10 INJECTION, SUSPENSION INTRA-ARTICULAR; INTRALESIONAL at 01:10

## 2023-10-17 NOTE — PROCEDURES
Tendon Sheath    Date/Time: 10/17/2023 1:15 PM    Performed by: Wiliam Katz,   Authorized by: Wiliam Katz, DO    Consent Done?:  Yes (Verbal)  Indications:  Pain and diagnostic evaluation  Site marked: the procedure site was marked    Timeout: prior to procedure the correct patient, procedure, and site was verified    Prep: patient was prepped and draped in usual sterile fashion      Ultrasonic guidance for needle placement?: No    Needle size:  25 G  Approach:  Medial  Medications:  10 mg triamcinolone acetonide 10 mg/mL  Patient tolerance:  Patient tolerated the procedure well with no immediate complications  Bicipital groove injection, left shoulder

## 2023-10-17 NOTE — PROGRESS NOTES
Chief Complaint: No chief complaint on file.        HPI:  Ms. Salas returned today for re-evaluation of her left shoulder.  At her last visit on 09/06/2023 she was given a steroid injection to her left shoulder and medial epicondyle.  Her elbow pain has resolved after she was given a steroid injection to her medial epicondyle.  She stated, I have pec pain .  Intermittently she will get Crunching in her shoulder  She has taken NSAIDs without help.  Her symptoms are not waking her at night but she does have pain with forward flexion and internal rotation.  She had an arthroscopy of her left shoulder with labrum debridement rotator cuff repair, biceps tenodesis, subacromial bursectomy, and distal clavicle resection, performed on 03/13/2023.     ROS:  No new diagnosis/ surgery/ prescriptions since last office visit on 09/06/2023  Constitution: Negative for chills and fever.   HENT: Negative for congestion.    Eyes: Negative for blurred vision.   Cardiovascular: Negative for chest pain.   Respiratory: Negative for cough.    Endocrine: Negative for polydipsia.   Hematologic/Lymphatic: Negative for adenopathy.   Skin: Negative for flushing and itching.   Musculoskeletal: Positive for back pain and joint pain. Negative for gout.   Gastrointestinal: Positive for heartburn. Negative for constipation and diarrhea.   Genitourinary: Negative for nocturia.   Neurological: Positive for headaches. Negative for seizures.   Psychiatric/Behavioral: Negative for depression and substance abuse. The patient is nervous/anxious.    Allergic/Immunologic: Positive for environmental allergies.      Objective:      Physical Exam:   General: AAOx3.  No acute distress  Vascular:  Pulses intact and equal bilaterally.  Capillary refill less than 3 seconds and equal bilaterally  Neurologic:  Pinprick and soft touch intact and equal bilaterally  Integment:  No ecchymosis, no errythema  Extremity: Shoulder:  Active forward flexion/abduction equal  bilaterally 0/170 degrees.  Internal rotation equal bilaterally.  External rotation equal bilaterally.  Full can negative both shoulders.  Empty can negative both shoulders.  Benjamin/Neer negative both shoulders.  Tender in the bicipital groove at the distal extent of the groove left shoulder.  Mild tenderness at the pectoralis insertion on the humerus left shoulder.  Cross-arm negative both shoulders.  Yergason's negative.  Apprehension/relocation negative both shoulders.  Radiography:  No new x-rays done today     Assessment:      Impression:      1. Biceps tendonitis, left shoulder.   2. Pectoralis tendonitis, left shoulder.   3. Left shoulder pain.              Plan:       1.  Discussed physical examination with the patient. Nely understands that she has biceps tendonitis and pectoralis tendinitis of her left shoulder.  Treatment alternatives and outcomes were discussed with the patient she understands she could continue to be treated conservatively with observation, activity modification, NSAIDs, bracing, physical therapy, injections, or she could consider surgical intervention such as possible repeat arthroscopy of her shoulder.  Recommend she continue with conservative management a little bit longer.  2. Offered a steroid injection to the bicipital groove left shoulder, she elected to proceed.  3. Continue doing home exercises.    4. Recommend she purchase over-the-counter Voltaren gel and applied to her shoulder twice daily and massage in for 2 minutes.    5. Follow up in 6 weeks if she is not better at follow-up will discuss possibly getting an MRI.

## 2023-12-08 ENCOUNTER — OFFICE VISIT (OUTPATIENT)
Dept: PODIATRY | Facility: CLINIC | Age: 53
End: 2023-12-08
Payer: MEDICAID

## 2023-12-08 VITALS
DIASTOLIC BLOOD PRESSURE: 95 MMHG | WEIGHT: 285 LBS | HEIGHT: 65 IN | HEART RATE: 75 BPM | SYSTOLIC BLOOD PRESSURE: 165 MMHG | BODY MASS INDEX: 47.48 KG/M2

## 2023-12-08 DIAGNOSIS — L85.3 DRY SKIN: ICD-10-CM

## 2023-12-08 DIAGNOSIS — G57.93 NEUROPATHIC PAIN OF BOTH FEET: ICD-10-CM

## 2023-12-08 DIAGNOSIS — L30.8 OTHER ECZEMA: Primary | ICD-10-CM

## 2023-12-08 DIAGNOSIS — R23.4 PEELING SKIN: ICD-10-CM

## 2023-12-08 DIAGNOSIS — E11.49 TYPE II DIABETES MELLITUS WITH NEUROLOGICAL MANIFESTATIONS: ICD-10-CM

## 2023-12-08 PROCEDURE — 3008F BODY MASS INDEX DOCD: CPT | Mod: CPTII,,, | Performed by: PODIATRIST

## 2023-12-08 PROCEDURE — 4010F ACE/ARB THERAPY RXD/TAKEN: CPT | Mod: CPTII,,, | Performed by: PODIATRIST

## 2023-12-08 PROCEDURE — 4010F PR ACE/ARB THEARPY RXD/TAKEN: ICD-10-PCS | Mod: CPTII,,, | Performed by: PODIATRIST

## 2023-12-08 PROCEDURE — 3077F PR MOST RECENT SYSTOLIC BLOOD PRESSURE >= 140 MM HG: ICD-10-PCS | Mod: CPTII,,, | Performed by: PODIATRIST

## 2023-12-08 PROCEDURE — 99214 OFFICE O/P EST MOD 30 MIN: CPT | Mod: S$PBB,,, | Performed by: PODIATRIST

## 2023-12-08 PROCEDURE — 99213 OFFICE O/P EST LOW 20 MIN: CPT | Mod: PBBFAC | Performed by: PODIATRIST

## 2023-12-08 PROCEDURE — 99999 PR PBB SHADOW E&M-EST. PATIENT-LVL III: CPT | Mod: PBBFAC,,, | Performed by: PODIATRIST

## 2023-12-08 PROCEDURE — 3077F SYST BP >= 140 MM HG: CPT | Mod: CPTII,,, | Performed by: PODIATRIST

## 2023-12-08 PROCEDURE — 99214 PR OFFICE/OUTPT VISIT, EST, LEVL IV, 30-39 MIN: ICD-10-PCS | Mod: S$PBB,,, | Performed by: PODIATRIST

## 2023-12-08 PROCEDURE — 99999 PR PBB SHADOW E&M-EST. PATIENT-LVL III: ICD-10-PCS | Mod: PBBFAC,,, | Performed by: PODIATRIST

## 2023-12-08 PROCEDURE — 1159F PR MEDICATION LIST DOCUMENTED IN MEDICAL RECORD: ICD-10-PCS | Mod: CPTII,,, | Performed by: PODIATRIST

## 2023-12-08 PROCEDURE — 1159F MED LIST DOCD IN RCRD: CPT | Mod: CPTII,,, | Performed by: PODIATRIST

## 2023-12-08 PROCEDURE — 3008F PR BODY MASS INDEX (BMI) DOCUMENTED: ICD-10-PCS | Mod: CPTII,,, | Performed by: PODIATRIST

## 2023-12-08 PROCEDURE — 3080F DIAST BP >= 90 MM HG: CPT | Mod: CPTII,,, | Performed by: PODIATRIST

## 2023-12-08 PROCEDURE — 3080F PR MOST RECENT DIASTOLIC BLOOD PRESSURE >= 90 MM HG: ICD-10-PCS | Mod: CPTII,,, | Performed by: PODIATRIST

## 2023-12-11 NOTE — PROGRESS NOTES
Subjective:       Patient ID: Nely Salas is a 53 y.o. female.    Chief Complaint: Ankle Pain, Foot Pain, and Foot Swelling  Patient presents with complaint of bilateral foot and ankle pain, swelling left foot, chronic dry skin dermatitis./tinea/eczema/peeling.  Has been applying code clobetasol twice daily to feet, has noticed improvement on the sides and front of her feet, not on the bottom.  Relates sharp shooting pains from foot to ankle at times and more swelling in the left foot than the right.   Relates diabetes is unchanged, she feels it is well controlled, does not know her A1c.  Does have diabetic neuropathic pain in feet, takes Lyrica.  We had prescribed Anthony's neuropathy compound cream last visit, patient states it did not help  On pain management and 300 mg Lyrica 3 times daily, physician in [facility]      Past Medical History:   Diagnosis Date    Anemia, unspecified     Diabetes mellitus, type 2     Diabetic neuropathy     GERD (gastroesophageal reflux disease)     Hypertension     Sleep apnea      Past Surgical History:   Procedure Laterality Date    ARTHROSCOPY OF SHOULDER WITH DECOMPRESSION OF SUBACROMIAL SPACE Left 3/13/2023    Procedure: ARTHROSCOPY, SHOULDER, WITH SUBACROMIAL SPACE DECOMPRESSION;  Surgeon: Wiliam Katz DO;  Location: [facility] OR;  Service: Orthopedics;  Laterality: Left;    ARTHROSCOPY OF SHOULDER WITH REMOVAL OF DISTAL CLAVICLE Left 3/13/2023    Procedure: ARTHROSCOPY, SHOULDER, WITH DISTAL CLAVICLE EXCISION;  Surgeon: Wiliam Katz DO;  Location: [facility] OR;  Service: Orthopedics;  Laterality: Left;    ARTHROSCOPY,SHOULDER,WITH BICEPS TENODESIS Left 3/13/2023    Procedure: ARTHROSCOPY,SHOULDER,WITH BICEPS TENODESIS;  Surgeon: Wiliam Katz DO;  Location: [facility] OR;  Service: Orthopedics;  Laterality: Left;     SECTION      CHOLECYSTECTOMY      FALLOPIAN TUBOPLASTY      SHOULDER ARTHROSCOPY Left 3/13/2023    Procedure: Arhtroscopy Left Shouler with  Possible Open Rotator Cuff Repair;  Surgeon: Wiliam Katz DO;  Location: [facility] OR;  Service: Orthopedics;  Laterality: Left;    TRIGGER FINGER RELEASE Left 6/13/2022    Procedure: TRIGGER FINGER RELEASE LEFT THUMB;  Surgeon: Wiliam Katz DO;  Location: [facility] OR;  Service: Orthopedics;  Laterality: Left;     No family history on file.  Social History     Socioeconomic History    Marital status:    Tobacco Use    Smoking status: Never    Smokeless tobacco: Never   Substance and Sexual Activity    Alcohol use: Yes     Comment: social    Drug use: Never    Sexual activity: Yes     Partners: Male       Current Outpatient Medications   Medication Sig Dispense Refill    citalopram (CELEXA) 20 MG tablet Take 20 mg by mouth every evening.      clobetasol 0.05% (TEMOVATE) 0.05 % Oint Apply topically 2 (two) times daily. 60 g 2    cloNIDine (CATAPRES) 0.1 MG tablet 0.1 mg.      EASY TOUCH TWIST LANCETS 30 gauge Misc MONITOR BLOOD GLUCOSE DAILY.      famotidine (PEPCID) 20 MG tablet 20 mg.      ibuprofen (ADVIL,MOTRIN) 800 MG tablet Take 800 mg by mouth every 8 (eight) hours as needed.      norethindrone (AYGESTIN) 5 mg Tab   See Instructions, Take on days 14-25 of cycle, # 12 tab, 6 Refill(s), Maintenance, Pharmacy: [facility], 163, cm, 09/28/23 13:39:00 CDT, Height/Length Measured, 129.9, kg, 09/28/23 13:39:00 CDT, Weight Dosing      oxyCODONE (ROXICODONE) 15 MG Tab Take 15 mg by mouth every 4 (four) hours as needed.      pantoprazole (PROTONIX) 20 MG tablet Take 1 tablet (20 mg total) by mouth once daily. 30 tablet 1    pregabalin (LYRICA) 300 MG Cap Take 300 mg by mouth 3 (three) times daily.      traZODone (DESYREL) 100 MG tablet 100 mg.      zolpidem (AMBIEN) 10 mg Tab Take 10 mg by mouth nightly as needed.       No current facility-administered medications for this visit.     Review of patient's allergies indicates:   Allergen Reactions    Bactrim [sulfamethoxazole-trimethoprim] Nausea  "And Vomiting    Zithromax [azithromycin] Nausea And Vomiting    Clotrimazole-betamethasone Other (See Comments)     Makes patient's feet red and cracks the skin more    Ketorolac Other (See Comments)     PT STATES IT DOES NOT WORK FOR HER    Morphine Hives    Potassium clavulanate      Other reaction(s): Unknown    Sulfamethoxazole Other (See Comments)    Trimethoprim Other (See Comments)    Amoxicillin trihydrate Nausea Only and Other (See Comments)    Sulfa (sulfonamide antibiotics) Nausea And Vomiting       Review of Systems   Cardiovascular:  Positive for leg swelling.   Musculoskeletal:  Positive for gait problem.   All other systems reviewed and are negative.      Objective:      Vitals:    12/08/23 1101   BP: (!) 165/95   BP Location: Left arm   Patient Position: Sitting   Pulse: 75   Weight: 129.3 kg (285 lb)   Height: 5' 5" (1.651 m)     Physical Exam  Vitals and nursing note reviewed.   Constitutional:       General: She is not in acute distress.     Appearance: Normal appearance.   Cardiovascular:      Pulses:           Dorsalis pedis pulses are 2+ on the right side and 2+ on the left side.        Posterior tibial pulses are 2+ on the right side and 2+ on the left side.   Pulmonary:      Effort: Pulmonary effort is normal.   Musculoskeletal:         General: Swelling and tenderness present.      Right foot: Decreased range of motion (limted AJ DF b/l).      Left foot: Decreased range of motion.      Comments: Pain across palpation forefoot bilateral consistent with inflammation, additionally may be contributing to neuropathy with some underlying neuritis, positive pedal edema bilateral   Feet:      Right foot:      Skin integrity: Dry skin present. No erythema.      Toenail Condition: Right toenails are abnormally thick. Fungal disease present.     Left foot:      Skin integrity: Dry skin (chronic dry skin, thickened, peeling skin, no skin breaks, fissures. There is improvement through ball of the feet as " well and the medial and lateral foot bilateral) present. No erythema.      Toenail Condition: Left toenails are abnormally thick. Fungal disease present.  Skin:     General: Skin is dry.      Capillary Refill: Capillary refill takes less than 2 seconds.      Comments: Dry skin dermatitis with chronic tinea pedis especially noted along the edges medial and laterally, large areas of peeling skin plantar feet   Neurological:      General: No focal deficit present.      Mental Status: She is alert.      Comments: Painful paresthesias feet, painful diabetic neuropathy   Psychiatric:         Behavior: Behavior normal.         Thought Content: Thought content normal.           OMPREHENSIVE METABOLIC PANEL  Order: [id number]   Ref Range & Units 3 mo ago   Sodium 136 - 145 mmol/L 141   Potassium 3.5 - 5.1 mmol/L 3.9   Chloride 98 - 107 mmol/L 108 High    CO2 21 - 32 mmol/L 29   BUN 7 - 18 mg/dL 13   Creatinine 0.55 - 1.02 mg/dL 0.82   Glucose 74 - 106 mg/dL 114 High    Calcium 8.5 - 10.1 mg/dL 9.2   Aspartate Aminotransferase 15 - 37 IU/L 7 Low    Alanine Aminotransferase 13 - 56 IU/L 20   Alk Phos 45 - 117 IU/L 94   Protein Total 6.4 - 8.2 g/dL 7.3   Albumin Level 3.4 - 5.0 g/dL 3.6   Bilirubin Total 0.2 - 1.0 mg/dL 0.4   Anion Gap 8.0 - 16.0 mmol/L 3.5 Low    eGFR CKD-EPI >90 ml/min/1.73m2 86 Low             Assessment:       1. Other eczema    2. Peeling skin    3. Type II diabetes mellitus with neurological manifestations    4. Neuropathic pain of both feet    5. Dry skin              Plan:            Reviewed diabetic education, controled glucose/diabetes regarding potential foot problems especially neuropathy  Reviewed even with controlled diabetes, the length of time having this condition contributes to neuropathy   Reviewed neuropathy and medication at length  Reviewed again with patient however there is still a fair amount of inflammation in her feet.  Discussed neuritis, inflammation of the nerves, this will  increase neuropathy pain and it is very common with patients who already have diabetic neuropathy.  Explained treatment is topical, ice/cool therapy and we discussed frequency this should be performed as long as well tolerated and beneficial  Explained by treating the inflammation skin will improve as well  Reviewed over-the-counter treatments including Voltaren gel as directed  There is improvement with a steroid cream of skin along the sides of the feet, this dry skin has completely resolved, has left discoloration due to chronicity  Instructed patient on use of moisturizer for remaining dry skin  Reviewed other soaking regimens for chronic dry skin  Reviewed potential complications due to peeling skin and we discussed several over-the-counter moisturizers, recommended CeraVe    Discussed maintenance of skin and nails and potential complications.    Areas of callused, peeling skin were debrided with healthy skin underneath.  There are no pustules, blisters, open areas or cracks in the skin   Reviewed appropriate shoes at length, this is absolutely crucial to protect and support nerve endings the bottom of her feet and help with inflammation   Reviewed need for daily foot checks and instructed patient to contact the office with any area of redness or swelling which has not improved within 3 days.  Patient was in understanding and agreement with treatment plan.  I counseled the patient on their conditions, implications and medical management.  Instructed patient to contact the office with any changes, questions, concerns, worsening of symptoms.   Total face to face time 30 minutes, exam, assessment, treatment, discussion, additional time for review of chart prior to and following appointment and visit documentation, consultation and coordination of care.    Follow up as needed      This note was created using [employer] voice recognition software that occasionally misinterpreted phrases or words.

## 2024-04-01 PROBLEM — B37.49 CANDIDIASIS OF PERINEUM: Status: ACTIVE | Noted: 2024-04-01

## 2024-04-01 PROBLEM — J33.9 NASAL POLYPOSIS: Status: ACTIVE | Noted: 2024-04-01

## 2024-04-09 NOTE — TELEPHONE ENCOUNTER
----- Message from Guerline Basurto sent at 9/18/2023 10:30 AM CDT -----  Regarding: sooner appt  Contact: pt  Type:  Sooner Apoointment Request    Caller is requesting a sooner appointment.  Caller declined first available appointment listed below.  Caller will not accept being placed on the waitlist and is requesting a message be sent to doctor.    Name of Caller:pt    When is the first available appointment?9/27/23  Symptoms:pain in both feet going on for a while  Would the patient rather a call back or a response via [facility]? Call back  Best Call Back Number:[phone]    Additional Information: sts she needs an appt due to catrachito spots on both feet and she can hardly walk on them -please advise and thank you        
----- Message from Julieta Jaquez sent at 9/18/2023  1:58 PM CDT -----  Regarding: return call  Contact: patient  Type:  Patient Returning Call    Who Called:  patient  Who Left Message for Patient:  Tracy  Does the patient know what this is regarding?:    Best Call Back Number:  [phone] (home)     Additional Information:  Patient states she missed a call from the office.  Please call patient to advise.  Thanks!      
Have not seen Ms Salas in a year. Can call in medrol dose pack if she would like, make appt first available. Thank you 
Patient has been scheduled and added to wail list as requested.   
Patient says she has red spots on her feet and can hardly walk.  No available appointments this week. Please advise  
85

## (undated) DEVICE — SUT ETHILON 4-0 PS2 18 BLK

## (undated) DEVICE — COVER PROXIMA MAYO STAND

## (undated) DEVICE — GLOVE SURG ULTRA TOUCH 8

## (undated) DEVICE — ELECTRODE REM PLYHSV RETURN 9

## (undated) DEVICE — CUTTER MENISCUS AGGRESSIVE 4.0

## (undated) DEVICE — NDL ECLIPSE SAFETY 18GX1-1/2IN

## (undated) DEVICE — TUBING SUCTION SCALLOP 3/16X10

## (undated) DEVICE — DRESSING TRANS 4X4 TEGADERM

## (undated) DEVICE — DRAPE STERI INSTRUMENT 1018

## (undated) DEVICE — SPONGE LAP 18X18 PREWASHED

## (undated) DEVICE — DRAPE THREE-QUARTER 53X77IN

## (undated) DEVICE — DRAPE ORTH SPLIT 77X108IN

## (undated) DEVICE — BLADE SURG #15 CARBON STEEL

## (undated) DEVICE — APPLICATOR CHLORAPREP ORN 26ML

## (undated) DEVICE — SPONGE/BRUSH SCRUB SURG PCMX

## (undated) DEVICE — SLING SHLDR IMMOBILIZER LG

## (undated) DEVICE — SHEET DRAPE FAN-FOLDED 3/4

## (undated) DEVICE — DRESSING N ADH OIL EMUL 3X3

## (undated) DEVICE — GLOVE SURG ULTRA TOUCH 7

## (undated) DEVICE — Device

## (undated) DEVICE — SOL LAC RINGERS IRR 3000ML

## (undated) DEVICE — NDL 10CC 20GAX1 COMBO

## (undated) DEVICE — GLOVE SURG ULTRA TOUCH 9

## (undated) DEVICE — SEE MEDLINE ITEM 157173

## (undated) DEVICE — LABEL FOR UTILITY MARKER

## (undated) DEVICE — CLOSURE SKIN STERI STRIP 1/2X4

## (undated) DEVICE — SUT VICRYL 2-0 CT-2 VCP269H

## (undated) DEVICE — CANISTER SUCTION MEDI-VAC 12L

## (undated) DEVICE — DRESSING TRANS 6X8 TEGADERM

## (undated) DEVICE — SUT CTD VICRYL 0 UND BR

## (undated) DEVICE — ADHESIVE MASTISOL VIAL 48/BX

## (undated) DEVICE — SEE MEDLINE ITEM 157216

## (undated) DEVICE — BNDG COFLEX FOAM LF2 ST 4X5YD

## (undated) DEVICE — MARKER SKIN RULER AND LABEL

## (undated) DEVICE — BLADE AGGR TOOTH MED 25X9

## (undated) DEVICE — DRAPE INCISE IOBAN 2 13X13IN

## (undated) DEVICE — SYR 30CC LUER LOCK

## (undated) DEVICE — NDL ANES SPINAL 18X3.5ST 18G

## (undated) DEVICE — NDL HYPODERMIC BLUNT 18G 1.5IN

## (undated) DEVICE — SUT MONOCRYL 4-0 PS-2

## (undated) DEVICE — DRAPE U STD FILM LG 60X84IN

## (undated) DEVICE — SET DECANTER MEDICHOICE

## (undated) DEVICE — GLOVE SURGEONS ULTRA TOUCH 6.5

## (undated) DEVICE — SUT PRLENE 1CT 1 BL MONO 30

## (undated) DEVICE — DRAPE SHOULDER W/POUCH 35X30IN

## (undated) DEVICE — GAUZE SPONGE 4X4 12PLY

## (undated) DEVICE — SOL 9P NACL IRR PIC IL

## (undated) DEVICE — SPONGE GAUZE 16PLY 4X4

## (undated) DEVICE — ELECTRODE COOLPULSE 90 W/HAND

## (undated) DEVICE — SEE MEDLINE ITEM 156964

## (undated) DEVICE — GLOVE SURG ULTRA TOUCH 8.5

## (undated) DEVICE — DRAPE STERI U-SHAPED 47X51IN

## (undated) DEVICE — DRAPE PLASTIC U 60X72

## (undated) DEVICE — GOWN B1 X-LG X-LONG

## (undated) DEVICE — TOURNIQUET SB QC SP 24X4IN

## (undated) DEVICE — BANDAGE ESMARK ELASTIC ST 4X9

## (undated) DEVICE — BANDAGE MATRIX HK LOOP 2IN 5YD

## (undated) DEVICE — RASP LARGE TEAR 12.7X7MM

## (undated) DEVICE — NDL SURGEON MAYO #7 2/PK 72PKS

## (undated) DEVICE — CANNULA MTK THRD CLR 8.5X75MM

## (undated) DEVICE — GLOVE GAMMEX SURG LF PI SZ 8

## (undated) DEVICE — SYR B-D DISP CONTROL 10CC100/C

## (undated) DEVICE — SEE MEDLINE ITEM 154981

## (undated) DEVICE — NDL ELECTRODE E-Z CLEAN 2.75IN

## (undated) DEVICE — GLOVE PI ULTRA TOUCH G SURGEON

## (undated) DEVICE — TUBING CROSSFLOW INFLOW CASS

## (undated) DEVICE — PAD SUREFIT GRND ELECTRD 10FT

## (undated) DEVICE — GOWN POLY REINF BRTH SLV LG